# Patient Record
Sex: FEMALE | Race: WHITE | NOT HISPANIC OR LATINO | Employment: OTHER | URBAN - METROPOLITAN AREA
[De-identification: names, ages, dates, MRNs, and addresses within clinical notes are randomized per-mention and may not be internally consistent; named-entity substitution may affect disease eponyms.]

---

## 2023-01-01 ENCOUNTER — APPOINTMENT (OUTPATIENT)
Dept: CARDIOLOGY | Facility: HOSPITAL | Age: 88
End: 2023-01-01
Payer: MEDICARE

## 2023-01-01 ENCOUNTER — OFFICE VISIT (OUTPATIENT)
Dept: CARDIOLOGY | Facility: HOSPITAL | Age: 88
End: 2023-01-01
Payer: MEDICARE

## 2023-01-01 ENCOUNTER — HOSPITAL ENCOUNTER (EMERGENCY)
Facility: HOSPITAL | Age: 88
Discharge: HOME | End: 2023-10-09
Attending: EMERGENCY MEDICINE | Admitting: EMERGENCY MEDICINE
Payer: MEDICARE

## 2023-01-01 ENCOUNTER — INFUSION (OUTPATIENT)
Dept: INFUSION THERAPY | Facility: HOSPITAL | Age: 88
End: 2023-01-01
Payer: MEDICARE

## 2023-01-01 ENCOUNTER — TELEPHONE (OUTPATIENT)
Dept: INFUSION THERAPY | Facility: HOSPITAL | Age: 88
End: 2023-01-01
Payer: MEDICARE

## 2023-01-01 ENCOUNTER — NURSING HOME VISIT (OUTPATIENT)
Dept: POST ACUTE CARE | Facility: EXTERNAL LOCATION | Age: 88
End: 2023-01-01
Payer: MEDICARE

## 2023-01-01 ENCOUNTER — APPOINTMENT (OUTPATIENT)
Dept: RADIOLOGY | Facility: HOSPITAL | Age: 88
DRG: 871 | End: 2023-01-01
Payer: MEDICARE

## 2023-01-01 ENCOUNTER — APPOINTMENT (OUTPATIENT)
Dept: CARDIOLOGY | Facility: HOSPITAL | Age: 88
DRG: 871 | End: 2023-01-01
Payer: MEDICARE

## 2023-01-01 ENCOUNTER — APPOINTMENT (OUTPATIENT)
Dept: RADIOLOGY | Facility: HOSPITAL | Age: 88
End: 2023-01-01
Payer: MEDICARE

## 2023-01-01 ENCOUNTER — APPOINTMENT (OUTPATIENT)
Dept: CARDIOLOGY | Facility: CLINIC | Age: 88
End: 2023-01-01
Payer: MEDICARE

## 2023-01-01 ENCOUNTER — HOSPITAL ENCOUNTER (INPATIENT)
Facility: HOSPITAL | Age: 88
LOS: 4 days | DRG: 871 | End: 2023-11-30
Attending: STUDENT IN AN ORGANIZED HEALTH CARE EDUCATION/TRAINING PROGRAM | Admitting: INTERNAL MEDICINE
Payer: MEDICARE

## 2023-01-01 ENCOUNTER — APPOINTMENT (OUTPATIENT)
Dept: VASCULAR MEDICINE | Facility: HOSPITAL | Age: 88
DRG: 871 | End: 2023-01-01
Payer: MEDICARE

## 2023-01-01 ENCOUNTER — LAB (OUTPATIENT)
Dept: LAB | Facility: LAB | Age: 88
End: 2023-01-01
Payer: MEDICARE

## 2023-01-01 ENCOUNTER — NURSING HOME VISIT (OUTPATIENT)
Dept: POST ACUTE CARE | Facility: EXTERNAL LOCATION | Age: 88
End: 2023-01-01

## 2023-01-01 ENCOUNTER — OFFICE VISIT (OUTPATIENT)
Dept: PRIMARY CARE | Facility: CLINIC | Age: 88
End: 2023-01-01
Payer: MEDICARE

## 2023-01-01 VITALS
RESPIRATION RATE: 18 BRPM | DIASTOLIC BLOOD PRESSURE: 72 MMHG | OXYGEN SATURATION: 95 % | SYSTOLIC BLOOD PRESSURE: 103 MMHG | HEART RATE: 100 BPM

## 2023-01-01 VITALS
RESPIRATION RATE: 31 BRPM | TEMPERATURE: 96.4 F | BODY MASS INDEX: 23.67 KG/M2 | HEIGHT: 64 IN | HEART RATE: 73 BPM | DIASTOLIC BLOOD PRESSURE: 28 MMHG | OXYGEN SATURATION: 99 % | SYSTOLIC BLOOD PRESSURE: 81 MMHG | WEIGHT: 138.67 LBS

## 2023-01-01 VITALS
SYSTOLIC BLOOD PRESSURE: 112 MMHG | DIASTOLIC BLOOD PRESSURE: 73 MMHG | RESPIRATION RATE: 18 BRPM | WEIGHT: 127.3 LBS | HEART RATE: 60 BPM | OXYGEN SATURATION: 95 % | BODY MASS INDEX: 21.85 KG/M2

## 2023-01-01 VITALS
HEART RATE: 73 BPM | RESPIRATION RATE: 18 BRPM | HEIGHT: 64 IN | OXYGEN SATURATION: 94 % | SYSTOLIC BLOOD PRESSURE: 150 MMHG | DIASTOLIC BLOOD PRESSURE: 78 MMHG | WEIGHT: 115 LBS | BODY MASS INDEX: 19.63 KG/M2 | TEMPERATURE: 98.3 F

## 2023-01-01 VITALS
BODY MASS INDEX: 21.63 KG/M2 | DIASTOLIC BLOOD PRESSURE: 68 MMHG | RESPIRATION RATE: 20 BRPM | HEART RATE: 58 BPM | OXYGEN SATURATION: 92 % | SYSTOLIC BLOOD PRESSURE: 100 MMHG | WEIGHT: 126 LBS

## 2023-01-01 VITALS
OXYGEN SATURATION: 92 % | RESPIRATION RATE: 18 BRPM | WEIGHT: 130.5 LBS | BODY MASS INDEX: 22.4 KG/M2 | SYSTOLIC BLOOD PRESSURE: 100 MMHG | DIASTOLIC BLOOD PRESSURE: 59 MMHG | HEART RATE: 92 BPM

## 2023-01-01 VITALS
WEIGHT: 121.6 LBS | TEMPERATURE: 96.9 F | BODY MASS INDEX: 20.87 KG/M2 | DIASTOLIC BLOOD PRESSURE: 60 MMHG | SYSTOLIC BLOOD PRESSURE: 100 MMHG | HEART RATE: 108 BPM

## 2023-01-01 VITALS — SYSTOLIC BLOOD PRESSURE: 112 MMHG | DIASTOLIC BLOOD PRESSURE: 68 MMHG | RESPIRATION RATE: 18 BRPM | HEART RATE: 98 BPM

## 2023-01-01 DIAGNOSIS — L03.116 CELLULITIS OF LEFT LOWER EXTREMITY: Primary | ICD-10-CM

## 2023-01-01 DIAGNOSIS — I48.0 PAROXYSMAL ATRIAL FIBRILLATION (MULTI): ICD-10-CM

## 2023-01-01 DIAGNOSIS — I50.42 CHRONIC COMBINED SYSTOLIC AND DIASTOLIC HEART FAILURE (MULTI): ICD-10-CM

## 2023-01-01 DIAGNOSIS — S82.91XD: ICD-10-CM

## 2023-01-01 DIAGNOSIS — A41.9 SEPTIC SHOCK (MULTI): ICD-10-CM

## 2023-01-01 DIAGNOSIS — I50.42 CHRONIC COMBINED SYSTOLIC AND DIASTOLIC HEART FAILURE (MULTI): Primary | ICD-10-CM

## 2023-01-01 DIAGNOSIS — I10 HYPERTENSION, ESSENTIAL: ICD-10-CM

## 2023-01-01 DIAGNOSIS — L03.116 CELLULITIS OF LEFT LOWER EXTREMITY: ICD-10-CM

## 2023-01-01 DIAGNOSIS — R53.1 WEAKNESS: Primary | ICD-10-CM

## 2023-01-01 DIAGNOSIS — R53.1 WEAKNESS: ICD-10-CM

## 2023-01-01 DIAGNOSIS — E11.9 TYPE 2 DIABETES MELLITUS WITHOUT COMPLICATION, WITHOUT LONG-TERM CURRENT USE OF INSULIN (MULTI): ICD-10-CM

## 2023-01-01 DIAGNOSIS — E46 PROTEIN-CALORIE MALNUTRITION, UNSPECIFIED SEVERITY (MULTI): Primary | ICD-10-CM

## 2023-01-01 DIAGNOSIS — S82.91XD: Primary | ICD-10-CM

## 2023-01-01 DIAGNOSIS — I42.0 CARDIOMYOPATHY, DILATED, NONISCHEMIC (MULTI): ICD-10-CM

## 2023-01-01 DIAGNOSIS — R13.12 OROPHARYNGEAL DYSPHAGIA: ICD-10-CM

## 2023-01-01 DIAGNOSIS — R09.89 OTHER SPECIFIED SYMPTOMS AND SIGNS INVOLVING THE CIRCULATORY AND RESPIRATORY SYSTEMS: ICD-10-CM

## 2023-01-01 DIAGNOSIS — E83.42 HYPOMAGNESEMIA: Primary | ICD-10-CM

## 2023-01-01 DIAGNOSIS — E87.6 HYPOKALEMIA: ICD-10-CM

## 2023-01-01 DIAGNOSIS — R65.21 SEPTIC SHOCK (MULTI): ICD-10-CM

## 2023-01-01 DIAGNOSIS — I50.22 CHRONIC SYSTOLIC HEART FAILURE (MULTI): ICD-10-CM

## 2023-01-01 DIAGNOSIS — L03.818 CELLULITIS OF OTHER SPECIFIED SITE: ICD-10-CM

## 2023-01-01 DIAGNOSIS — L03.90 CELLULITIS, UNSPECIFIED CELLULITIS SITE: Primary | ICD-10-CM

## 2023-01-01 DIAGNOSIS — I73.9 PERIPHERAL VASCULAR DISEASE, UNSPECIFIED (CMS-HCC): ICD-10-CM

## 2023-01-01 DIAGNOSIS — I50.41 ACUTE COMBINED SYSTOLIC (CONGESTIVE) AND DIASTOLIC (CONGESTIVE) HEART FAILURE (MULTI): ICD-10-CM

## 2023-01-01 DIAGNOSIS — R19.7 DIARRHEA, UNSPECIFIED TYPE: ICD-10-CM

## 2023-01-01 LAB
ALBUMIN SERPL BCP-MCNC: 3.1 G/DL (ref 3.4–5)
ALBUMIN SERPL BCP-MCNC: 3.4 G/DL (ref 3.4–5)
ALP SERPL-CCNC: 59 U/L (ref 33–136)
ALP SERPL-CCNC: 92 U/L (ref 33–136)
ALT SERPL W P-5'-P-CCNC: 12 U/L (ref 7–45)
ALT SERPL W P-5'-P-CCNC: 15 U/L (ref 7–45)
ANION GAP BLDV CALCULATED.4IONS-SCNC: 14 MMOL/L (ref 10–25)
ANION GAP BLDV CALCULATED.4IONS-SCNC: 14 MMOL/L (ref 10–25)
ANION GAP BLDV CALCULATED.4IONS-SCNC: 7 MMOL/L (ref 10–25)
ANION GAP IN SER/PLAS: 12 MMOL/L (ref 10–20)
ANION GAP SERPL CALC-SCNC: 13 MMOL/L
ANION GAP SERPL CALC-SCNC: 13 MMOL/L (ref 10–20)
ANION GAP SERPL CALC-SCNC: 13 MMOL/L (ref 10–20)
ANION GAP SERPL CALC-SCNC: 14 MMOL/L (ref 10–20)
ANION GAP SERPL CALC-SCNC: 16 MMOL/L (ref 10–20)
ANION GAP SERPL CALC-SCNC: 16 MMOL/L (ref 10–20)
ANION GAP SERPL CALC-SCNC: 17 MMOL/L (ref 10–20)
ANION GAP SERPL CALC-SCNC: 21 MMOL/L (ref 10–20)
AORTIC VALVE PEAK VELOCITY: 0.85
AST SERPL W P-5'-P-CCNC: 18 U/L (ref 9–39)
AST SERPL W P-5'-P-CCNC: 34 U/L (ref 9–39)
AV PEAK GRADIENT: 2.9
AVA (PEAK VEL): 2.12
BASE EXCESS BLDV CALC-SCNC: -0.7 MMOL/L (ref -2–3)
BASE EXCESS BLDV CALC-SCNC: -3 MMOL/L (ref -2–3)
BASE EXCESS BLDV CALC-SCNC: -3.3 MMOL/L (ref -2–3)
BASE EXCESS BLDV CALC-SCNC: 4.1 MMOL/L (ref -2–3)
BASOPHILS # BLD AUTO: 0.03 X10*3/UL (ref 0–0.1)
BASOPHILS NFR BLD AUTO: 0.3 %
BASOPHILS NFR BLD AUTO: 0.4 %
BASOPHILS NFR BLD AUTO: 0.5 %
BILIRUB SERPL-MCNC: 1.4 MG/DL (ref 0–1.2)
BILIRUB SERPL-MCNC: 1.5 MG/DL (ref 0–1.2)
BNP SERPL-MCNC: 2886 PG/ML (ref 0–99)
BNP SERPL-MCNC: >4700 PG/ML (ref 0–99)
BODY TEMPERATURE: 37 DEGREES CELSIUS
BUN SERPL-MCNC: 26 MG/DL (ref 6–23)
BUN SERPL-MCNC: 26 MG/DL (ref 6–23)
BUN SERPL-MCNC: 27 MG/DL (ref 6–23)
BUN SERPL-MCNC: 28 MG/DL (ref 6–23)
BUN SERPL-MCNC: 29 MG/DL (ref 6–23)
BUN SERPL-MCNC: 8 MG/DL (ref 6–23)
BURR CELLS BLD QL SMEAR: NORMAL
CA-I BLDV-SCNC: 1.1 MMOL/L (ref 1.1–1.33)
CA-I BLDV-SCNC: 1.11 MMOL/L (ref 1.1–1.33)
CA-I BLDV-SCNC: 1.12 MMOL/L (ref 1.1–1.33)
CALCIUM (MG/DL) IN SER/PLAS: 9.6 MG/DL (ref 8.6–10.3)
CALCIUM SERPL-MCNC: 8.1 MG/DL (ref 8.6–10.3)
CALCIUM SERPL-MCNC: 8.1 MG/DL (ref 8.6–10.3)
CALCIUM SERPL-MCNC: 8.4 MG/DL (ref 8.6–10.3)
CALCIUM SERPL-MCNC: 8.5 MG/DL (ref 8.6–10.3)
CALCIUM SERPL-MCNC: 8.8 MG/DL (ref 8.6–10.3)
CALCIUM SERPL-MCNC: 8.9 MG/DL (ref 8.6–10.3)
CALCIUM SERPL-MCNC: 8.9 MG/DL (ref 8.6–10.3)
CALCIUM SERPL-MCNC: 9 MG/DL (ref 8.6–10.3)
CARBON DIOXIDE, TOTAL (MMOL/L) IN SER/PLAS: 31 MMOL/L (ref 21–32)
CHLORIDE (MMOL/L) IN SER/PLAS: 104 MMOL/L (ref 98–107)
CHLORIDE BLDV-SCNC: 97 MMOL/L (ref 98–107)
CHLORIDE BLDV-SCNC: 98 MMOL/L (ref 98–107)
CHLORIDE BLDV-SCNC: 98 MMOL/L (ref 98–107)
CHLORIDE SERPL-SCNC: 100 MMOL/L (ref 98–107)
CHLORIDE SERPL-SCNC: 101 MMOL/L (ref 98–107)
CHLORIDE SERPL-SCNC: 101 MMOL/L (ref 98–107)
CHLORIDE SERPL-SCNC: 102 MMOL/L (ref 98–107)
CHLORIDE SERPL-SCNC: 103 MMOL/L (ref 98–107)
CHLORIDE SERPL-SCNC: 96 MMOL/L (ref 98–107)
CHLORIDE SERPL-SCNC: 96 MMOL/L (ref 98–107)
CHLORIDE SERPL-SCNC: 97 MMOL/L (ref 98–107)
CO2 SERPL-SCNC: 16 MMOL/L (ref 21–32)
CO2 SERPL-SCNC: 23 MMOL/L (ref 21–32)
CO2 SERPL-SCNC: 24 MMOL/L (ref 21–32)
CO2 SERPL-SCNC: 25 MMOL/L (ref 21–32)
CO2 SERPL-SCNC: 26 MMOL/L (ref 21–32)
CO2 SERPL-SCNC: 27 MMOL/L (ref 21–32)
CO2 SERPL-SCNC: 27 MMOL/L (ref 21–32)
CO2 SERPL-SCNC: 28 MMOL/L (ref 21–32)
CREAT SERPL-MCNC: 0.85 MG/DL (ref 0.5–1.05)
CREAT SERPL-MCNC: 1.09 MG/DL (ref 0.5–1.05)
CREAT SERPL-MCNC: 1.12 MG/DL (ref 0.5–1.05)
CREAT SERPL-MCNC: 1.13 MG/DL (ref 0.5–1.05)
CREAT SERPL-MCNC: 1.21 MG/DL (ref 0.5–1.05)
CREAT SERPL-MCNC: 1.23 MG/DL (ref 0.5–1.05)
CREAT SERPL-MCNC: 1.23 MG/DL (ref 0.5–1.05)
CREAT SERPL-MCNC: 1.42 MG/DL (ref 0.5–1.05)
CREATININE (MG/DL) IN SER/PLAS: 1.54 MG/DL (ref 0.5–1.05)
CRP SERPL-MCNC: 12.47 MG/DL
EJECTION FRACTION APICAL 4 CHAMBER: 17.1
EJECTION FRACTION: 16
EOSINOPHIL # BLD AUTO: 0.01 X10*3/UL (ref 0–0.4)
EOSINOPHIL # BLD AUTO: 0.02 X10*3/UL (ref 0–0.4)
EOSINOPHIL # BLD AUTO: 0.03 X10*3/UL (ref 0–0.4)
EOSINOPHIL NFR BLD AUTO: 0.1 %
EOSINOPHIL NFR BLD AUTO: 0.3 %
EOSINOPHIL NFR BLD AUTO: 0.4 %
ERYTHROCYTE [DISTWIDTH] IN BLOOD BY AUTOMATED COUNT: 16 % (ref 11.5–14.5)
ERYTHROCYTE [DISTWIDTH] IN BLOOD BY AUTOMATED COUNT: 17 % (ref 11.5–14.5)
ERYTHROCYTE [DISTWIDTH] IN BLOOD BY AUTOMATED COUNT: 21.2 % (ref 11.5–14.5)
ERYTHROCYTE [DISTWIDTH] IN BLOOD BY AUTOMATED COUNT: 21.2 % (ref 11.5–14.5)
ERYTHROCYTE [DISTWIDTH] IN BLOOD BY AUTOMATED COUNT: 21.7 % (ref 11.5–14.5)
ERYTHROCYTE [DISTWIDTH] IN BLOOD BY AUTOMATED COUNT: 21.7 % (ref 11.5–14.5)
ERYTHROCYTE [DISTWIDTH] IN BLOOD BY AUTOMATED COUNT: 21.9 % (ref 11.5–14.5)
ERYTHROCYTE [DISTWIDTH] IN BLOOD BY AUTOMATED COUNT: NORMAL %
ERYTHROCYTE [SEDIMENTATION RATE] IN BLOOD BY WESTERGREN METHOD: 5 MM/H (ref 0–30)
GFR FEMALE: 33 ML/MIN/1.73M2
GFR SERPL CREATININE-BSD FRML MDRD: 36 ML/MIN/1.73M*2
GFR SERPL CREATININE-BSD FRML MDRD: 43 ML/MIN/1.73M*2
GFR SERPL CREATININE-BSD FRML MDRD: 43 ML/MIN/1.73M*2
GFR SERPL CREATININE-BSD FRML MDRD: 44 ML/MIN/1.73M*2
GFR SERPL CREATININE-BSD FRML MDRD: 47 ML/MIN/1.73M*2
GFR SERPL CREATININE-BSD FRML MDRD: 48 ML/MIN/1.73M*2
GFR SERPL CREATININE-BSD FRML MDRD: 50 ML/MIN/1.73M*2
GFR SERPL CREATININE-BSD FRML MDRD: 67 ML/MIN/1.73M*2
GLUCOSE (MG/DL) IN SER/PLAS: 104 MG/DL (ref 74–99)
GLUCOSE BLD MANUAL STRIP-MCNC: 69 MG/DL (ref 74–99)
GLUCOSE BLD MANUAL STRIP-MCNC: 73 MG/DL (ref 74–99)
GLUCOSE BLDV-MCNC: 137 MG/DL (ref 74–99)
GLUCOSE BLDV-MCNC: 59 MG/DL (ref 74–99)
GLUCOSE BLDV-MCNC: 83 MG/DL (ref 74–99)
GLUCOSE SERPL-MCNC: 118 MG/DL (ref 74–99)
GLUCOSE SERPL-MCNC: 125 MG/DL (ref 74–99)
GLUCOSE SERPL-MCNC: 135 MG/DL (ref 74–99)
GLUCOSE SERPL-MCNC: 62 MG/DL (ref 74–99)
GLUCOSE SERPL-MCNC: 71 MG/DL (ref 74–99)
GLUCOSE SERPL-MCNC: 76 MG/DL (ref 74–99)
GLUCOSE SERPL-MCNC: 77 MG/DL (ref 74–99)
GLUCOSE SERPL-MCNC: 92 MG/DL (ref 74–99)
HCO3 BLDV-SCNC: 22.7 MMOL/L (ref 22–26)
HCO3 BLDV-SCNC: 23.2 MMOL/L (ref 22–26)
HCO3 BLDV-SCNC: 23.4 MMOL/L (ref 22–26)
HCO3 BLDV-SCNC: 28.5 MMOL/L (ref 22–26)
HCT VFR BLD AUTO: 31.2 % (ref 36–46)
HCT VFR BLD AUTO: 36.2 % (ref 36–46)
HCT VFR BLD AUTO: 38.4 % (ref 36–46)
HCT VFR BLD AUTO: 38.9 % (ref 36–46)
HCT VFR BLD AUTO: 41.1 % (ref 36–46)
HCT VFR BLD AUTO: 42.4 % (ref 36–46)
HCT VFR BLD AUTO: 45.2 % (ref 36–46)
HCT VFR BLD AUTO: NORMAL %
HCT VFR BLD EST: 32 % (ref 36–46)
HCT VFR BLD EST: 34 % (ref 36–46)
HCT VFR BLD EST: 39 % (ref 36–46)
HGB BLD-MCNC: 11.3 G/DL (ref 12–16)
HGB BLD-MCNC: 12 G/DL (ref 12–16)
HGB BLD-MCNC: 12.2 G/DL (ref 12–16)
HGB BLD-MCNC: 12.9 G/DL (ref 12–16)
HGB BLD-MCNC: 13 G/DL (ref 12–16)
HGB BLD-MCNC: 13.7 G/DL (ref 12–16)
HGB BLD-MCNC: 9.7 G/DL (ref 12–16)
HGB BLD-MCNC: NORMAL G/DL
HGB BLDV-MCNC: 10.5 G/DL (ref 12–16)
HGB BLDV-MCNC: 11.4 G/DL (ref 12–16)
HGB BLDV-MCNC: 13.1 G/DL (ref 12–16)
HYPOCHROMIA BLD QL SMEAR: NORMAL
IMM GRANULOCYTES # BLD AUTO: 0.01 X10*3/UL (ref 0–0.5)
IMM GRANULOCYTES # BLD AUTO: 0.04 X10*3/UL (ref 0–0.5)
IMM GRANULOCYTES # BLD AUTO: 0.05 X10*3/UL (ref 0–0.5)
IMM GRANULOCYTES NFR BLD AUTO: 0.2 % (ref 0–0.9)
IMM GRANULOCYTES NFR BLD AUTO: 0.5 % (ref 0–0.9)
IMM GRANULOCYTES NFR BLD AUTO: 0.5 % (ref 0–0.9)
INHALED O2 CONCENTRATION: 21 %
LACTATE BLDV-SCNC: 1.7 MMOL/L (ref 0.4–2)
LACTATE BLDV-SCNC: 1.8 MMOL/L (ref 0.4–2)
LACTATE BLDV-SCNC: 3.3 MMOL/L (ref 0.4–2)
LACTATE BLDV-SCNC: 3.8 MMOL/L (ref 0.4–2)
LACTATE SERPL-SCNC: 3.1 MMOL/L (ref 0.4–2)
LACTATE SERPL-SCNC: 3.3 MMOL/L (ref 0.4–2)
LACTATE SERPL-SCNC: 4.7 MMOL/L (ref 0.4–2)
LEFT ATRIUM VOLUME AREA LENGTH INDEX BSA: 44.3
LEFT VENTRICLE INTERNAL DIMENSION DIASTOLE: 4.53 (ref 3.5–6)
LEFT VENTRICULAR OUTFLOW TRACT DIAMETER: 1.96
LYMPHOCYTES # BLD AUTO: 0.57 X10*3/UL (ref 0.8–3)
LYMPHOCYTES # BLD AUTO: 0.91 X10*3/UL (ref 0.8–3)
LYMPHOCYTES # BLD AUTO: 1.3 X10*3/UL (ref 0.8–3)
LYMPHOCYTES NFR BLD AUTO: 12.5 %
LYMPHOCYTES NFR BLD AUTO: 20.4 %
LYMPHOCYTES NFR BLD AUTO: 5.9 %
MAGNESIUM (MG/DL) IN SER/PLAS: 1.93 MG/DL (ref 1.6–2.4)
MAGNESIUM SERPL-MCNC: 1.46 MG/DL (ref 1.6–2.4)
MAGNESIUM SERPL-MCNC: 1.52 MG/DL (ref 1.6–2.4)
MAGNESIUM SERPL-MCNC: 1.56 MG/DL (ref 1.6–2.4)
MAGNESIUM SERPL-MCNC: 1.92 MG/DL (ref 1.6–2.4)
MCH RBC QN AUTO: 25 PG (ref 26–34)
MCH RBC QN AUTO: 25.5 PG (ref 26–34)
MCH RBC QN AUTO: 25.6 PG (ref 26–34)
MCH RBC QN AUTO: 25.6 PG (ref 26–34)
MCH RBC QN AUTO: 25.8 PG (ref 26–34)
MCH RBC QN AUTO: NORMAL PG
MCHC RBC AUTO-ENTMCNC: 30.3 G/DL (ref 32–36)
MCHC RBC AUTO-ENTMCNC: 30.7 G/DL (ref 32–36)
MCHC RBC AUTO-ENTMCNC: 31.1 G/DL (ref 32–36)
MCHC RBC AUTO-ENTMCNC: 31.2 G/DL (ref 32–36)
MCHC RBC AUTO-ENTMCNC: 31.3 G/DL (ref 32–36)
MCHC RBC AUTO-ENTMCNC: 31.4 G/DL (ref 32–36)
MCHC RBC AUTO-ENTMCNC: 31.4 G/DL (ref 32–36)
MCHC RBC AUTO-ENTMCNC: NORMAL G/DL
MCV RBC AUTO: 81 FL (ref 80–100)
MCV RBC AUTO: 82 FL (ref 80–100)
MCV RBC AUTO: 85 FL (ref 80–100)
MCV RBC AUTO: NORMAL FL
MITRAL VALVE E/A RATIO: 0.67
MITRAL VALVE E/E' RATIO: 31.13
MONOCYTES # BLD AUTO: 0.41 X10*3/UL (ref 0.05–0.8)
MONOCYTES # BLD AUTO: 0.45 X10*3/UL (ref 0.05–0.8)
MONOCYTES # BLD AUTO: 0.49 X10*3/UL (ref 0.05–0.8)
MONOCYTES NFR BLD AUTO: 4.2 %
MONOCYTES NFR BLD AUTO: 6.7 %
MONOCYTES NFR BLD AUTO: 7.1 %
MRSA DNA SPEC QL NAA+PROBE: NOT DETECTED
NATRIURETIC PEPTIDE B (PG/ML) IN SER/PLAS: 952 PG/ML (ref 0–99)
NEUTROPHILS # BLD AUTO: 4.56 X10*3/UL (ref 1.6–5.5)
NEUTROPHILS # BLD AUTO: 5.78 X10*3/UL (ref 1.6–5.5)
NEUTROPHILS # BLD AUTO: 8.65 X10*3/UL (ref 1.6–5.5)
NEUTROPHILS NFR BLD AUTO: 71.5 %
NEUTROPHILS NFR BLD AUTO: 79.5 %
NEUTROPHILS NFR BLD AUTO: 89 %
NEUTS VAC BLD QL SMEAR: PRESENT
NRBC BLD-RTO: 0 /100 WBCS (ref 0–0)
NRBC BLD-RTO: NORMAL /100{WBCS}
OVALOCYTES BLD QL SMEAR: NORMAL
OVALOCYTES BLD QL SMEAR: NORMAL
OXYHGB MFR BLDV: 12.9 % (ref 45–75)
OXYHGB MFR BLDV: 36.3 % (ref 45–75)
OXYHGB MFR BLDV: 43 % (ref 45–75)
OXYHGB MFR BLDV: 57.6 % (ref 45–75)
PCO2 BLDV: 36 MM HG (ref 41–51)
PCO2 BLDV: 41 MM HG (ref 41–51)
PCO2 BLDV: 44 MM HG (ref 41–51)
PCO2 BLDV: 45 MM HG (ref 41–51)
PH BLDV: 7.32 PH (ref 7.33–7.43)
PH BLDV: 7.32 PH (ref 7.33–7.43)
PH BLDV: 7.42 PH (ref 7.33–7.43)
PH BLDV: 7.45 PH (ref 7.33–7.43)
PHOSPHATE SERPL-MCNC: 4.1 MG/DL (ref 2.5–4.9)
PLATELET # BLD AUTO: 151 X10*3/UL (ref 150–450)
PLATELET # BLD AUTO: 201 X10*3/UL (ref 150–450)
PLATELET # BLD AUTO: 202 X10*3/UL (ref 150–450)
PLATELET # BLD AUTO: 214 X10*3/UL (ref 150–450)
PLATELET # BLD AUTO: 219 X10*3/UL (ref 150–450)
PLATELET # BLD AUTO: 241 X10*3/UL (ref 150–450)
PLATELET # BLD AUTO: 246 X10*3/UL (ref 150–450)
PLATELET # BLD AUTO: NORMAL 10*3/UL
PMV BLD AUTO: 9.5 FL (ref 7.5–11.5)
PMV BLD AUTO: 9.8 FL (ref 7.5–11.5)
PO2 BLDV: 23 MM HG (ref 35–45)
PO2 BLDV: 33 MM HG (ref 35–45)
PO2 BLDV: 35 MM HG (ref 35–45)
PO2 BLDV: 45 MM HG (ref 35–45)
POTASSIUM (MMOL/L) IN SER/PLAS: 3.9 MMOL/L (ref 3.5–5.3)
POTASSIUM BLDV-SCNC: 3.3 MMOL/L (ref 3.5–5.3)
POTASSIUM BLDV-SCNC: 3.4 MMOL/L (ref 3.5–5.3)
POTASSIUM BLDV-SCNC: 4.6 MMOL/L (ref 3.5–5.3)
POTASSIUM SERPL-SCNC: 2.8 MMOL/L (ref 3.5–5.3)
POTASSIUM SERPL-SCNC: 3.5 MMOL/L (ref 3.5–5.3)
POTASSIUM SERPL-SCNC: 3.5 MMOL/L (ref 3.5–5.3)
POTASSIUM SERPL-SCNC: 3.6 MMOL/L (ref 3.5–5.3)
POTASSIUM SERPL-SCNC: 4.1 MMOL/L (ref 3.5–5.3)
POTASSIUM SERPL-SCNC: 4.2 MMOL/L (ref 3.5–5.3)
POTASSIUM SERPL-SCNC: 4.2 MMOL/L (ref 3.5–5.3)
POTASSIUM SERPL-SCNC: 4.4 MMOL/L (ref 3.5–5.3)
PROT SERPL-MCNC: 4.8 G/DL (ref 6.4–8.2)
PROT SERPL-MCNC: 6.1 G/DL (ref 6.4–8.2)
RBC # BLD AUTO: 3.81 X10*6/UL (ref 4–5.2)
RBC # BLD AUTO: 4.44 X10*6/UL (ref 4–5.2)
RBC # BLD AUTO: 4.69 X10*6/UL (ref 4–5.2)
RBC # BLD AUTO: 4.78 X10*6/UL (ref 4–5.2)
RBC # BLD AUTO: 5.04 X10*6/UL (ref 4–5.2)
RBC # BLD AUTO: 5.19 X10*6/UL (ref 4–5.2)
RBC # BLD AUTO: 5.3 X10*6/UL (ref 4–5.2)
RBC # BLD AUTO: NORMAL 10*6/UL
RBC MORPH BLD: NORMAL
RBC MORPH BLD: NORMAL
RIGHT VENTRICLE FREE WALL PEAK S': 12.88
RIGHT VENTRICLE PEAK SYSTOLIC PRESSURE: 41.3
SAO2 % BLDV: 13 % (ref 45–75)
SAO2 % BLDV: 37 % (ref 45–75)
SAO2 % BLDV: 44 % (ref 45–75)
SAO2 % BLDV: 58 % (ref 45–75)
SODIUM (MMOL/L) IN SER/PLAS: 143 MMOL/L (ref 136–145)
SODIUM BLDV-SCNC: 128 MMOL/L (ref 136–145)
SODIUM BLDV-SCNC: 131 MMOL/L (ref 136–145)
SODIUM BLDV-SCNC: 132 MMOL/L (ref 136–145)
SODIUM SERPL-SCNC: 131 MMOL/L (ref 136–145)
SODIUM SERPL-SCNC: 134 MMOL/L (ref 136–145)
SODIUM SERPL-SCNC: 134 MMOL/L (ref 136–145)
SODIUM SERPL-SCNC: 135 MMOL/L (ref 136–145)
SODIUM SERPL-SCNC: 135 MMOL/L (ref 136–145)
SODIUM SERPL-SCNC: 137 MMOL/L (ref 136–145)
SODIUM SERPL-SCNC: 138 MMOL/L (ref 136–145)
SODIUM SERPL-SCNC: 140 MMOL/L (ref 136–145)
TARGETS BLD QL SMEAR: NORMAL
TEST COMMENT: ABNORMAL
TRICUSPID ANNULAR PLANE SYSTOLIC EXCURSION: 1.5
UREA NITROGEN (MG/DL) IN SER/PLAS: 23 MG/DL (ref 6–23)
VANCOMYCIN TROUGH SERPL-MCNC: 31.1 UG/ML (ref 5–20)
WBC # BLD AUTO: 6.4 X10*3/UL (ref 4.4–11.3)
WBC # BLD AUTO: 7.2 X10*3/UL (ref 4.4–11.3)
WBC # BLD AUTO: 7.3 X10*3/UL (ref 4.4–11.3)
WBC # BLD AUTO: 7.4 X10*3/UL (ref 4.4–11.3)
WBC # BLD AUTO: 8.5 X10*3/UL (ref 4.4–11.3)
WBC # BLD AUTO: 9.6 X10*3/UL (ref 4.4–11.3)
WBC # BLD AUTO: 9.7 X10*3/UL (ref 4.4–11.3)
WBC # BLD AUTO: NORMAL 10*3/UL

## 2023-01-01 PROCEDURE — 2500000001 HC RX 250 WO HCPCS SELF ADMINISTERED DRUGS (ALT 637 FOR MEDICARE OP): Performed by: NURSE PRACTITIONER

## 2023-01-01 PROCEDURE — 2500000004 HC RX 250 GENERAL PHARMACY W/ HCPCS (ALT 636 FOR OP/ED): Performed by: STUDENT IN AN ORGANIZED HEALTH CARE EDUCATION/TRAINING PROGRAM

## 2023-01-01 PROCEDURE — 73706 CT ANGIO LWR EXTR W/O&W/DYE: CPT | Mod: LT,FR

## 2023-01-01 PROCEDURE — 87040 BLOOD CULTURE FOR BACTERIA: CPT | Mod: PORLAB | Performed by: STUDENT IN AN ORGANIZED HEALTH CARE EDUCATION/TRAINING PROGRAM

## 2023-01-01 PROCEDURE — 96374 THER/PROPH/DIAG INJ IV PUSH: CPT | Mod: INF

## 2023-01-01 PROCEDURE — 02HV33Z INSERTION OF INFUSION DEVICE INTO SUPERIOR VENA CAVA, PERCUTANEOUS APPROACH: ICD-10-PCS | Performed by: PHYSICIAN ASSISTANT

## 2023-01-01 PROCEDURE — 86140 C-REACTIVE PROTEIN: CPT | Performed by: PHYSICIAN ASSISTANT

## 2023-01-01 PROCEDURE — 1036F TOBACCO NON-USER: CPT | Performed by: INTERNAL MEDICINE

## 2023-01-01 PROCEDURE — 73630 X-RAY EXAM OF FOOT: CPT | Mod: LT,FY

## 2023-01-01 PROCEDURE — 99291 CRITICAL CARE FIRST HOUR: CPT | Performed by: INTERNAL MEDICINE

## 2023-01-01 PROCEDURE — 1159F MED LIST DOCD IN RCRD: CPT | Performed by: NURSE PRACTITIONER

## 2023-01-01 PROCEDURE — 83735 ASSAY OF MAGNESIUM: CPT

## 2023-01-01 PROCEDURE — 71045 X-RAY EXAM CHEST 1 VIEW: CPT | Performed by: STUDENT IN AN ORGANIZED HEALTH CARE EDUCATION/TRAINING PROGRAM

## 2023-01-01 PROCEDURE — 84075 ASSAY ALKALINE PHOSPHATASE: CPT | Performed by: STUDENT IN AN ORGANIZED HEALTH CARE EDUCATION/TRAINING PROGRAM

## 2023-01-01 PROCEDURE — 3074F SYST BP LT 130 MM HG: CPT | Performed by: NURSE PRACTITIONER

## 2023-01-01 PROCEDURE — 87075 CULTR BACTERIA EXCEPT BLOOD: CPT | Mod: PORLAB | Performed by: PHYSICIAN ASSISTANT

## 2023-01-01 PROCEDURE — 36556 INSERT NON-TUNNEL CV CATH: CPT | Performed by: INTERNAL MEDICINE

## 2023-01-01 PROCEDURE — 87075 CULTR BACTERIA EXCEPT BLOOD: CPT | Mod: PORLAB | Performed by: STUDENT IN AN ORGANIZED HEALTH CARE EDUCATION/TRAINING PROGRAM

## 2023-01-01 PROCEDURE — 2500000004 HC RX 250 GENERAL PHARMACY W/ HCPCS (ALT 636 FOR OP/ED): Performed by: INTERNAL MEDICINE

## 2023-01-01 PROCEDURE — 80048 BASIC METABOLIC PNL TOTAL CA: CPT

## 2023-01-01 PROCEDURE — 93306 TTE W/DOPPLER COMPLETE: CPT | Performed by: INTERNAL MEDICINE

## 2023-01-01 PROCEDURE — 2500000001 HC RX 250 WO HCPCS SELF ADMINISTERED DRUGS (ALT 637 FOR MEDICARE OP): Performed by: INTERNAL MEDICINE

## 2023-01-01 PROCEDURE — 1036F TOBACCO NON-USER: CPT | Performed by: NURSE PRACTITIONER

## 2023-01-01 PROCEDURE — 2500000001 HC RX 250 WO HCPCS SELF ADMINISTERED DRUGS (ALT 637 FOR MEDICARE OP): Performed by: PHYSICIAN ASSISTANT

## 2023-01-01 PROCEDURE — 84295 ASSAY OF SERUM SODIUM: CPT | Performed by: STUDENT IN AN ORGANIZED HEALTH CARE EDUCATION/TRAINING PROGRAM

## 2023-01-01 PROCEDURE — 99214 OFFICE O/P EST MOD 30 MIN: CPT | Performed by: NURSE PRACTITIONER

## 2023-01-01 PROCEDURE — 85027 COMPLETE CBC AUTOMATED: CPT | Performed by: INTERNAL MEDICINE

## 2023-01-01 PROCEDURE — 93971 EXTREMITY STUDY: CPT | Performed by: RADIOLOGY

## 2023-01-01 PROCEDURE — 1160F RVW MEDS BY RX/DR IN RCRD: CPT | Performed by: INTERNAL MEDICINE

## 2023-01-01 PROCEDURE — 36415 COLL VENOUS BLD VENIPUNCTURE: CPT | Performed by: INTERNAL MEDICINE

## 2023-01-01 PROCEDURE — 80048 BASIC METABOLIC PNL TOTAL CA: CPT | Performed by: INTERNAL MEDICINE

## 2023-01-01 PROCEDURE — 99239 HOSP IP/OBS DSCHRG MGMT >30: CPT | Performed by: PHYSICIAN ASSISTANT

## 2023-01-01 PROCEDURE — 2500000004 HC RX 250 GENERAL PHARMACY W/ HCPCS (ALT 636 FOR OP/ED)

## 2023-01-01 PROCEDURE — 99308 SBSQ NF CARE LOW MDM 20: CPT | Performed by: INTERNAL MEDICINE

## 2023-01-01 PROCEDURE — 2500000002 HC RX 250 W HCPCS SELF ADMINISTERED DRUGS (ALT 637 FOR MEDICARE OP, ALT 636 FOR OP/ED): Performed by: INTERNAL MEDICINE

## 2023-01-01 PROCEDURE — 1126F AMNT PAIN NOTED NONE PRSNT: CPT | Performed by: NURSE PRACTITIONER

## 2023-01-01 PROCEDURE — 99309 SBSQ NF CARE MODERATE MDM 30: CPT | Performed by: NURSE PRACTITIONER

## 2023-01-01 PROCEDURE — 82947 ASSAY GLUCOSE BLOOD QUANT: CPT

## 2023-01-01 PROCEDURE — 1100000001 HC PRIVATE ROOM DAILY

## 2023-01-01 PROCEDURE — 84132 ASSAY OF SERUM POTASSIUM: CPT | Performed by: STUDENT IN AN ORGANIZED HEALTH CARE EDUCATION/TRAINING PROGRAM

## 2023-01-01 PROCEDURE — 83880 ASSAY OF NATRIURETIC PEPTIDE: CPT

## 2023-01-01 PROCEDURE — 71045 X-RAY EXAM CHEST 1 VIEW: CPT | Performed by: RADIOLOGY

## 2023-01-01 PROCEDURE — 83605 ASSAY OF LACTIC ACID: CPT | Performed by: INTERNAL MEDICINE

## 2023-01-01 PROCEDURE — 83605 ASSAY OF LACTIC ACID: CPT | Performed by: PHYSICIAN ASSISTANT

## 2023-01-01 PROCEDURE — 1160F RVW MEDS BY RX/DR IN RCRD: CPT | Performed by: NURSE PRACTITIONER

## 2023-01-01 PROCEDURE — 71045 X-RAY EXAM CHEST 1 VIEW: CPT | Mod: FY

## 2023-01-01 PROCEDURE — 2500000005 HC RX 250 GENERAL PHARMACY W/O HCPCS: Performed by: PHARMACIST

## 2023-01-01 PROCEDURE — 36415 COLL VENOUS BLD VENIPUNCTURE: CPT | Performed by: NURSE PRACTITIONER

## 2023-01-01 PROCEDURE — 99232 SBSQ HOSP IP/OBS MODERATE 35: CPT | Performed by: NURSE PRACTITIONER

## 2023-01-01 PROCEDURE — 83735 ASSAY OF MAGNESIUM: CPT | Performed by: INTERNAL MEDICINE

## 2023-01-01 PROCEDURE — 85025 COMPLETE CBC W/AUTO DIFF WBC: CPT | Performed by: STUDENT IN AN ORGANIZED HEALTH CARE EDUCATION/TRAINING PROGRAM

## 2023-01-01 PROCEDURE — 73706 CT ANGIO LWR EXTR W/O&W/DYE: CPT | Mod: LEFT SIDE | Performed by: RADIOLOGY

## 2023-01-01 PROCEDURE — 92610 EVALUATE SWALLOWING FUNCTION: CPT | Mod: GN | Performed by: SPEECH-LANGUAGE PATHOLOGIST

## 2023-01-01 PROCEDURE — 73718 MRI LOWER EXTREMITY W/O DYE: CPT | Mod: LEFT SIDE | Performed by: RADIOLOGY

## 2023-01-01 PROCEDURE — 99223 1ST HOSP IP/OBS HIGH 75: CPT | Performed by: PHYSICIAN ASSISTANT

## 2023-01-01 PROCEDURE — 36415 COLL VENOUS BLD VENIPUNCTURE: CPT | Performed by: STUDENT IN AN ORGANIZED HEALTH CARE EDUCATION/TRAINING PROGRAM

## 2023-01-01 PROCEDURE — 85652 RBC SED RATE AUTOMATED: CPT | Performed by: PHYSICIAN ASSISTANT

## 2023-01-01 PROCEDURE — 93922 UPR/L XTREMITY ART 2 LEVELS: CPT | Performed by: SURGERY

## 2023-01-01 PROCEDURE — 99214 OFFICE O/P EST MOD 30 MIN: CPT | Mod: 25 | Performed by: NURSE PRACTITIONER

## 2023-01-01 PROCEDURE — 84132 ASSAY OF SERUM POTASSIUM: CPT | Performed by: INTERNAL MEDICINE

## 2023-01-01 PROCEDURE — 1159F MED LIST DOCD IN RCRD: CPT | Performed by: INTERNAL MEDICINE

## 2023-01-01 PROCEDURE — 2500000004 HC RX 250 GENERAL PHARMACY W/ HCPCS (ALT 636 FOR OP/ED): Performed by: NURSE PRACTITIONER

## 2023-01-01 PROCEDURE — 99305 1ST NF CARE MODERATE MDM 35: CPT | Performed by: INTERNAL MEDICINE

## 2023-01-01 PROCEDURE — 3078F DIAST BP <80 MM HG: CPT | Performed by: NURSE PRACTITIONER

## 2023-01-01 PROCEDURE — 36415 COLL VENOUS BLD VENIPUNCTURE: CPT | Performed by: PHYSICIAN ASSISTANT

## 2023-01-01 PROCEDURE — 73718 MRI LOWER EXTREMITY W/O DYE: CPT | Mod: LT

## 2023-01-01 PROCEDURE — 99213 OFFICE O/P EST LOW 20 MIN: CPT | Performed by: NURSE PRACTITIONER

## 2023-01-01 PROCEDURE — 93922 UPR/L XTREMITY ART 2 LEVELS: CPT

## 2023-01-01 PROCEDURE — 93306 TTE W/DOPPLER COMPLETE: CPT

## 2023-01-01 PROCEDURE — 85025 COMPLETE CBC W/AUTO DIFF WBC: CPT | Performed by: NURSE PRACTITIONER

## 2023-01-01 PROCEDURE — 80053 COMPREHEN METABOLIC PANEL: CPT | Performed by: INTERNAL MEDICINE

## 2023-01-01 PROCEDURE — 82805 BLOOD GASES W/O2 SATURATION: CPT | Performed by: INTERNAL MEDICINE

## 2023-01-01 PROCEDURE — 73630 X-RAY EXAM OF FOOT: CPT | Mod: LEFT SIDE | Performed by: RADIOLOGY

## 2023-01-01 PROCEDURE — 2500000005 HC RX 250 GENERAL PHARMACY W/O HCPCS: Performed by: INTERNAL MEDICINE

## 2023-01-01 PROCEDURE — 96367 TX/PROPH/DG ADDL SEQ IV INF: CPT

## 2023-01-01 PROCEDURE — 36415 COLL VENOUS BLD VENIPUNCTURE: CPT

## 2023-01-01 PROCEDURE — 1126F AMNT PAIN NOTED NONE PRSNT: CPT | Performed by: INTERNAL MEDICINE

## 2023-01-01 PROCEDURE — 87641 MR-STAPH DNA AMP PROBE: CPT | Performed by: PHARMACIST

## 2023-01-01 PROCEDURE — 96365 THER/PROPH/DIAG IV INF INIT: CPT

## 2023-01-01 PROCEDURE — 85018 HEMOGLOBIN: CPT | Performed by: STUDENT IN AN ORGANIZED HEALTH CARE EDUCATION/TRAINING PROGRAM

## 2023-01-01 PROCEDURE — 80202 ASSAY OF VANCOMYCIN: CPT | Performed by: INTERNAL MEDICINE

## 2023-01-01 PROCEDURE — 85025 COMPLETE CBC W/AUTO DIFF WBC: CPT | Performed by: INTERNAL MEDICINE

## 2023-01-01 PROCEDURE — 2020000001 HC ICU ROOM DAILY

## 2023-01-01 PROCEDURE — 99233 SBSQ HOSP IP/OBS HIGH 50: CPT | Performed by: PHYSICIAN ASSISTANT

## 2023-01-01 PROCEDURE — 92526 ORAL FUNCTION THERAPY: CPT | Mod: GN | Performed by: SPEECH-LANGUAGE PATHOLOGIST

## 2023-01-01 PROCEDURE — 83735 ASSAY OF MAGNESIUM: CPT | Performed by: STUDENT IN AN ORGANIZED HEALTH CARE EDUCATION/TRAINING PROGRAM

## 2023-01-01 PROCEDURE — 3E043XZ INTRODUCTION OF VASOPRESSOR INTO CENTRAL VEIN, PERCUTANEOUS APPROACH: ICD-10-PCS | Performed by: PHYSICIAN ASSISTANT

## 2023-01-01 PROCEDURE — P9045 ALBUMIN (HUMAN), 5%, 250 ML: HCPCS | Mod: JZ | Performed by: PHYSICIAN ASSISTANT

## 2023-01-01 PROCEDURE — 85018 HEMOGLOBIN: CPT | Performed by: INTERNAL MEDICINE

## 2023-01-01 PROCEDURE — 99231 SBSQ HOSP IP/OBS SF/LOW 25: CPT

## 2023-01-01 PROCEDURE — 99285 EMERGENCY DEPT VISIT HI MDM: CPT | Mod: 25 | Performed by: STUDENT IN AN ORGANIZED HEALTH CARE EDUCATION/TRAINING PROGRAM

## 2023-01-01 PROCEDURE — 85027 COMPLETE CBC AUTOMATED: CPT

## 2023-01-01 PROCEDURE — 87185 SC STD ENZYME DETCJ PER NZM: CPT | Mod: PORLAB | Performed by: PHYSICIAN ASSISTANT

## 2023-01-01 PROCEDURE — 80048 BASIC METABOLIC PNL TOTAL CA: CPT | Performed by: NURSE PRACTITIONER

## 2023-01-01 PROCEDURE — 99418 PROLNG IP/OBS E/M EA 15 MIN: CPT

## 2023-01-01 PROCEDURE — 2550000001 HC RX 255 CONTRASTS: Performed by: STUDENT IN AN ORGANIZED HEALTH CARE EDUCATION/TRAINING PROGRAM

## 2023-01-01 PROCEDURE — 83605 ASSAY OF LACTIC ACID: CPT | Performed by: STUDENT IN AN ORGANIZED HEALTH CARE EDUCATION/TRAINING PROGRAM

## 2023-01-01 PROCEDURE — 2500000004 HC RX 250 GENERAL PHARMACY W/ HCPCS (ALT 636 FOR OP/ED): Performed by: PHYSICIAN ASSISTANT

## 2023-01-01 PROCEDURE — 37799 UNLISTED PX VASCULAR SURGERY: CPT | Performed by: INTERNAL MEDICINE

## 2023-01-01 PROCEDURE — 99233 SBSQ HOSP IP/OBS HIGH 50: CPT | Performed by: INTERNAL MEDICINE

## 2023-01-01 PROCEDURE — 84100 ASSAY OF PHOSPHORUS: CPT | Performed by: INTERNAL MEDICINE

## 2023-01-01 PROCEDURE — 97166 OT EVAL MOD COMPLEX 45 MIN: CPT | Mod: GO | Performed by: OCCUPATIONAL THERAPIST

## 2023-01-01 PROCEDURE — 83880 ASSAY OF NATRIURETIC PEPTIDE: CPT | Performed by: PHYSICIAN ASSISTANT

## 2023-01-01 PROCEDURE — 93971 EXTREMITY STUDY: CPT

## 2023-01-01 PROCEDURE — 99223 1ST HOSP IP/OBS HIGH 75: CPT

## 2023-01-01 PROCEDURE — 99223 1ST HOSP IP/OBS HIGH 75: CPT | Performed by: INTERNAL MEDICINE

## 2023-01-01 PROCEDURE — 99214 OFFICE O/P EST MOD 30 MIN: CPT | Performed by: INTERNAL MEDICINE

## 2023-01-01 PROCEDURE — 3078F DIAST BP <80 MM HG: CPT | Performed by: INTERNAL MEDICINE

## 2023-01-01 PROCEDURE — 2500000002 HC RX 250 W HCPCS SELF ADMINISTERED DRUGS (ALT 637 FOR MEDICARE OP, ALT 636 FOR OP/ED): Performed by: PHYSICIAN ASSISTANT

## 2023-01-01 PROCEDURE — 1200000002 HC GENERAL ROOM WITH TELEMETRY DAILY

## 2023-01-01 PROCEDURE — 3074F SYST BP LT 130 MM HG: CPT | Performed by: INTERNAL MEDICINE

## 2023-01-01 PROCEDURE — 99306 1ST NF CARE HIGH MDM 50: CPT | Performed by: INTERNAL MEDICINE

## 2023-01-01 PROCEDURE — 99285 EMERGENCY DEPT VISIT HI MDM: CPT | Mod: 25

## 2023-01-01 RX ORDER — APIXABAN 2.5 MG/1
2.5 TABLET, FILM COATED ORAL 2 TIMES DAILY
COMMUNITY
End: 2023-01-01 | Stop reason: SDUPTHER

## 2023-01-01 RX ORDER — SACUBITRIL AND VALSARTAN 49; 51 MG/1; MG/1
1 TABLET, FILM COATED ORAL 2 TIMES DAILY
COMMUNITY
End: 2023-01-01 | Stop reason: SDUPTHER

## 2023-01-01 RX ORDER — ZINC OXIDE 20 G/100G
1 OINTMENT TOPICAL 2 TIMES DAILY
Status: DISCONTINUED | OUTPATIENT
Start: 2023-01-01 | End: 2023-12-01 | Stop reason: HOSPADM

## 2023-01-01 RX ORDER — MIDAZOLAM HYDROCHLORIDE 1 MG/ML
2 INJECTION, SOLUTION INTRAMUSCULAR; INTRAVENOUS ONCE
Status: COMPLETED | OUTPATIENT
Start: 2023-01-01 | End: 2023-01-01

## 2023-01-01 RX ORDER — FUROSEMIDE 10 MG/ML
40 INJECTION INTRAMUSCULAR; INTRAVENOUS ONCE
Status: CANCELLED
Start: 2023-01-01

## 2023-01-01 RX ORDER — MEROPENEM 1 G/1
1000 INJECTION, POWDER, FOR SOLUTION INTRAVENOUS EVERY 12 HOURS
Status: DISCONTINUED | OUTPATIENT
Start: 2023-01-01 | End: 2023-12-01 | Stop reason: HOSPADM

## 2023-01-01 RX ORDER — FUROSEMIDE 40 MG/1
40 TABLET ORAL DAILY
Status: DISCONTINUED | OUTPATIENT
Start: 2023-01-01 | End: 2023-01-01

## 2023-01-01 RX ORDER — SODIUM CHLORIDE 9 MG/ML
100 INJECTION, SOLUTION INTRAVENOUS CONTINUOUS
Status: DISCONTINUED | OUTPATIENT
Start: 2023-01-01 | End: 2023-01-01

## 2023-01-01 RX ORDER — DAPAGLIFLOZIN 10 MG/1
10 TABLET, FILM COATED ORAL
COMMUNITY

## 2023-01-01 RX ORDER — FUROSEMIDE 10 MG/ML
40 INJECTION INTRAMUSCULAR; INTRAVENOUS ONCE
Status: COMPLETED | OUTPATIENT
Start: 2023-01-01 | End: 2023-01-01

## 2023-01-01 RX ORDER — ALBUMIN HUMAN 50 G/1000ML
25 SOLUTION INTRAVENOUS ONCE
Status: COMPLETED | OUTPATIENT
Start: 2023-01-01 | End: 2023-01-01

## 2023-01-01 RX ORDER — FUROSEMIDE 40 MG/1
40 TABLET ORAL DAILY
COMMUNITY

## 2023-01-01 RX ORDER — MAGNESIUM GLUCONATE 27 MG(500)
27 TABLET ORAL DAILY
Qty: 60 TABLET | Refills: 11 | Status: SHIPPED | OUTPATIENT
Start: 2023-01-01 | End: 2023-12-01

## 2023-01-01 RX ORDER — MIRTAZAPINE 7.5 MG/1
7.5 TABLET, FILM COATED ORAL NIGHTLY
Status: DISCONTINUED | OUTPATIENT
Start: 2023-01-01 | End: 2023-12-01 | Stop reason: HOSPADM

## 2023-01-01 RX ORDER — ALPRAZOLAM 0.25 MG/1
0.25 TABLET ORAL ONCE
Status: COMPLETED | OUTPATIENT
Start: 2023-01-01 | End: 2023-01-01

## 2023-01-01 RX ORDER — CARVEDILOL 6.25 MG/1
6.25 TABLET ORAL 2 TIMES DAILY
Status: DISCONTINUED | OUTPATIENT
Start: 2023-01-01 | End: 2023-12-01 | Stop reason: HOSPADM

## 2023-01-01 RX ORDER — ACETAMINOPHEN 650 MG/1
650 SUPPOSITORY RECTAL EVERY 4 HOURS PRN
Status: DISCONTINUED | OUTPATIENT
Start: 2023-01-01 | End: 2023-12-01 | Stop reason: HOSPADM

## 2023-01-01 RX ORDER — NOREPINEPHRINE BITARTRATE/D5W 8 MG/250ML
PLASTIC BAG, INJECTION (ML) INTRAVENOUS
Status: DISPENSED
Start: 2023-01-01 | End: 2023-01-01

## 2023-01-01 RX ORDER — POTASSIUM CHLORIDE 750 MG/1
40 TABLET, FILM COATED, EXTENDED RELEASE ORAL ONCE
Status: COMPLETED | OUTPATIENT
Start: 2023-01-01 | End: 2023-01-01

## 2023-01-01 RX ORDER — POTASSIUM CHLORIDE 20 MEQ/1
20 TABLET, EXTENDED RELEASE ORAL 2 TIMES DAILY
Qty: 14 TABLET | Refills: 0 | Status: SHIPPED | OUTPATIENT
Start: 2023-01-01 | End: 2023-01-01

## 2023-01-01 RX ORDER — ONDANSETRON 4 MG/1
4 TABLET, FILM COATED ORAL EVERY 8 HOURS PRN
Status: DISCONTINUED | OUTPATIENT
Start: 2023-01-01 | End: 2023-12-01 | Stop reason: HOSPADM

## 2023-01-01 RX ORDER — ACETAMINOPHEN 325 MG/1
650 TABLET ORAL EVERY 4 HOURS PRN
Status: DISCONTINUED | OUTPATIENT
Start: 2023-01-01 | End: 2023-12-01 | Stop reason: HOSPADM

## 2023-01-01 RX ORDER — ATORVASTATIN CALCIUM 40 MG/1
40 TABLET, FILM COATED ORAL DAILY
Status: ON HOLD | COMMUNITY
End: 2023-01-01 | Stop reason: ENTERED-IN-ERROR

## 2023-01-01 RX ORDER — CEFEPIME 1 G/50ML
2 INJECTION, SOLUTION INTRAVENOUS EVERY 12 HOURS
Status: DISCONTINUED | OUTPATIENT
Start: 2023-01-01 | End: 2023-01-01

## 2023-01-01 RX ORDER — MAGNESIUM SULFATE HEPTAHYDRATE 40 MG/ML
2 INJECTION, SOLUTION INTRAVENOUS ONCE
Status: COMPLETED | OUTPATIENT
Start: 2023-01-01 | End: 2023-01-01

## 2023-01-01 RX ORDER — SPIRONOLACTONE 25 MG/1
25 TABLET ORAL DAILY
Qty: 90 TABLET | Refills: 1 | Status: SHIPPED | OUTPATIENT
Start: 2023-01-01 | End: 2023-12-01

## 2023-01-01 RX ORDER — LORAZEPAM 2 MG/ML
0.5 INJECTION INTRAMUSCULAR EVERY 8 HOURS PRN
Status: DISCONTINUED | OUTPATIENT
Start: 2023-01-01 | End: 2023-12-01 | Stop reason: HOSPADM

## 2023-01-01 RX ORDER — ACETAMINOPHEN 160 MG/5ML
650 SOLUTION ORAL EVERY 4 HOURS PRN
Status: DISCONTINUED | OUTPATIENT
Start: 2023-01-01 | End: 2023-12-01 | Stop reason: HOSPADM

## 2023-01-01 RX ORDER — AMIODARONE HYDROCHLORIDE 200 MG/1
200 TABLET ORAL DAILY
COMMUNITY

## 2023-01-01 RX ORDER — CLINDAMYCIN PHOSPHATE 900 MG/50ML
900 INJECTION, SOLUTION INTRAVENOUS EVERY 8 HOURS
Status: DISCONTINUED | OUTPATIENT
Start: 2023-01-01 | End: 2023-01-01

## 2023-01-01 RX ORDER — DOPAMINE HYDROCHLORIDE 160 MG/100ML
1-10 INJECTION, SOLUTION INTRAVENOUS CONTINUOUS
Status: DISCONTINUED | OUTPATIENT
Start: 2023-01-01 | End: 2023-01-01

## 2023-01-01 RX ORDER — OMEPRAZOLE 20 MG/1
20 TABLET, DELAYED RELEASE ORAL
Status: ON HOLD | COMMUNITY
End: 2023-01-01 | Stop reason: WASHOUT

## 2023-01-01 RX ORDER — MIDAZOLAM HYDROCHLORIDE 1 MG/ML
INJECTION, SOLUTION INTRAMUSCULAR; INTRAVENOUS
Status: COMPLETED
Start: 2023-01-01 | End: 2023-01-01

## 2023-01-01 RX ORDER — AMIODARONE HYDROCHLORIDE 200 MG/1
200 TABLET ORAL DAILY
Status: DISCONTINUED | OUTPATIENT
Start: 2023-01-01 | End: 2023-12-01 | Stop reason: HOSPADM

## 2023-01-01 RX ORDER — CARVEDILOL 12.5 MG/1
1.5 TABLET ORAL 2 TIMES DAILY
COMMUNITY

## 2023-01-01 RX ORDER — ONDANSETRON HYDROCHLORIDE 2 MG/ML
4 INJECTION, SOLUTION INTRAVENOUS EVERY 8 HOURS PRN
Status: DISCONTINUED | OUTPATIENT
Start: 2023-01-01 | End: 2023-12-01 | Stop reason: HOSPADM

## 2023-01-01 RX ORDER — POTASSIUM CHLORIDE 20 MEQ/1
20 TABLET, EXTENDED RELEASE ORAL DAILY
COMMUNITY

## 2023-01-01 RX ORDER — SPIRONOLACTONE 25 MG/1
12.5 TABLET ORAL DAILY
COMMUNITY
Start: 2023-01-01 | End: 2023-01-01 | Stop reason: SDUPTHER

## 2023-01-01 RX ORDER — CEPHALEXIN 250 MG/1
500 CAPSULE ORAL ONCE
Status: COMPLETED | OUTPATIENT
Start: 2023-01-01 | End: 2023-01-01

## 2023-01-01 RX ORDER — NOREPINEPHRINE BITARTRATE 0.06 MG/ML
.05-1 INJECTION, SOLUTION INTRAVENOUS CONTINUOUS
Status: DISCONTINUED | OUTPATIENT
Start: 2023-01-01 | End: 2023-01-01

## 2023-01-01 RX ORDER — CEPHALEXIN 500 MG/1
500 CAPSULE ORAL 4 TIMES DAILY
Qty: 28 CAPSULE | Refills: 0 | Status: SHIPPED | OUTPATIENT
Start: 2023-01-01 | End: 2023-01-01 | Stop reason: ALTCHOICE

## 2023-01-01 RX ORDER — PHENYLEPHRINE 10 MG/250 ML(40 MCG/ML)IN 0.9 % SOD.CHLORIDE INTRAVENOUS
0-9 CONTINUOUS
Status: DISCONTINUED | OUTPATIENT
Start: 2023-01-01 | End: 2023-01-01

## 2023-01-01 RX ORDER — FUROSEMIDE 10 MG/ML
80 INJECTION INTRAMUSCULAR; INTRAVENOUS 2 TIMES DAILY
Status: DISCONTINUED | OUTPATIENT
Start: 2023-01-01 | End: 2023-12-01 | Stop reason: HOSPADM

## 2023-01-01 RX ORDER — FUROSEMIDE 10 MG/ML
40 INJECTION INTRAMUSCULAR; INTRAVENOUS ONCE
Status: CANCELLED
Start: 2023-01-01 | End: 2023-01-01

## 2023-01-01 RX ORDER — NOREPINEPHRINE BITARTRATE 0.06 MG/ML
.01-.5 INJECTION, SOLUTION INTRAVENOUS CONTINUOUS
Status: DISCONTINUED | OUTPATIENT
Start: 2023-01-01 | End: 2023-01-01

## 2023-01-01 RX ORDER — NOREPINEPHRINE BITARTRATE/D5W 8 MG/250ML
.01-1 PLASTIC BAG, INJECTION (ML) INTRAVENOUS CONTINUOUS
Status: DISCONTINUED | OUTPATIENT
Start: 2023-01-01 | End: 2023-01-01

## 2023-01-01 RX ORDER — ACETAMINOPHEN 325 MG/1
325 TABLET ORAL EVERY 6 HOURS PRN
COMMUNITY

## 2023-01-01 RX ORDER — VANCOMYCIN HYDROCHLORIDE 750 MG/150ML
750 INJECTION, SOLUTION INTRAVENOUS EVERY 24 HOURS
Status: DISCONTINUED | OUTPATIENT
Start: 2023-01-01 | End: 2023-12-01 | Stop reason: HOSPADM

## 2023-01-01 RX ORDER — SACUBITRIL AND VALSARTAN 49; 51 MG/1; MG/1
1 TABLET, FILM COATED ORAL 2 TIMES DAILY
Qty: 180 TABLET | Refills: 3 | Status: SHIPPED | OUTPATIENT
Start: 2023-01-01 | End: 2023-12-01

## 2023-01-01 RX ORDER — APIXABAN 2.5 MG/1
2.5 TABLET, FILM COATED ORAL 2 TIMES DAILY
Qty: 180 TABLET | Refills: 1 | Status: SHIPPED | OUTPATIENT
Start: 2023-01-01 | End: 2023-12-01

## 2023-01-01 RX ORDER — LANOLIN ALCOHOL/MO/W.PET/CERES
400 CREAM (GRAM) TOPICAL DAILY
Status: DISCONTINUED | OUTPATIENT
Start: 2023-01-01 | End: 2023-12-01 | Stop reason: HOSPADM

## 2023-01-01 RX ORDER — DEXTROSE MONOHYDRATE AND SODIUM CHLORIDE 5; .9 G/100ML; G/100ML
30 INJECTION, SOLUTION INTRAVENOUS CONTINUOUS
Status: DISCONTINUED | OUTPATIENT
Start: 2023-01-01 | End: 2023-12-01 | Stop reason: HOSPADM

## 2023-01-01 RX ORDER — POLYETHYLENE GLYCOL 3350 17 G/17G
17 POWDER, FOR SOLUTION ORAL DAILY
Status: DISCONTINUED | OUTPATIENT
Start: 2023-01-01 | End: 2023-12-01 | Stop reason: HOSPADM

## 2023-01-01 RX ORDER — CARVEDILOL 3.12 MG/1
3.12 TABLET ORAL 2 TIMES DAILY
Status: CANCELLED | OUTPATIENT
Start: 2023-01-01

## 2023-01-01 RX ORDER — CEPHALEXIN 250 MG/1
250 CAPSULE ORAL 4 TIMES DAILY
Qty: 28 CAPSULE | Refills: 0 | Status: ON HOLD | OUTPATIENT
Start: 2023-01-01 | End: 2023-01-01 | Stop reason: ENTERED-IN-ERROR

## 2023-01-01 RX ADMIN — Medication 0.5 MCG/KG/MIN: at 00:32

## 2023-01-01 RX ADMIN — Medication 2 G: at 14:12

## 2023-01-01 RX ADMIN — SODIUM CHLORIDE, POTASSIUM CHLORIDE, SODIUM LACTATE AND CALCIUM CHLORIDE 1000 ML: 600; 310; 30; 20 INJECTION, SOLUTION INTRAVENOUS at 10:04

## 2023-01-01 RX ADMIN — ALBUMIN HUMAN 25 G: 0.05 INJECTION, SOLUTION INTRAVENOUS at 19:30

## 2023-01-01 RX ADMIN — DOPAMINE HYDROCHLORIDE 1 MCG/KG/MIN: 160 INJECTION, SOLUTION INTRAVENOUS at 04:06

## 2023-01-01 RX ADMIN — Medication 2 G: at 15:31

## 2023-01-01 RX ADMIN — AMIODARONE HYDROCHLORIDE 200 MG: 200 TABLET ORAL at 13:11

## 2023-01-01 RX ADMIN — CEPHALEXIN 500 MG: 250 CAPSULE ORAL at 21:07

## 2023-01-01 RX ADMIN — MAGNESIUM SULFATE HEPTAHYDRATE 2 G: 40 INJECTION, SOLUTION INTRAVENOUS at 02:49

## 2023-01-01 RX ADMIN — Medication 2 G: at 02:12

## 2023-01-01 RX ADMIN — ACETAMINOPHEN 650 MG: 325 TABLET ORAL at 20:35

## 2023-01-01 RX ADMIN — VANCOMYCIN HYDROCHLORIDE 750 MG: 750 INJECTION, SOLUTION INTRAVENOUS at 19:58

## 2023-01-01 RX ADMIN — MEROPENEM 1000 MG: 1 INJECTION, POWDER, FOR SOLUTION INTRAVENOUS at 14:28

## 2023-01-01 RX ADMIN — VANCOMYCIN HYDROCHLORIDE 1.5 G: 1.5 INJECTION, POWDER, LYOPHILIZED, FOR SOLUTION INTRAVENOUS at 19:22

## 2023-01-01 RX ADMIN — CARVEDILOL 6.25 MG: 6.25 TABLET, FILM COATED ORAL at 20:35

## 2023-01-01 RX ADMIN — CLINDAMYCIN PHOSPHATE 900 MG: 900 INJECTION, SOLUTION INTRAVENOUS at 06:03

## 2023-01-01 RX ADMIN — CLINDAMYCIN PHOSPHATE 900 MG: 900 INJECTION, SOLUTION INTRAVENOUS at 06:33

## 2023-01-01 RX ADMIN — APIXABAN 2.5 MG: 2.5 TABLET, FILM COATED ORAL at 15:52

## 2023-01-01 RX ADMIN — CEFEPIME 2 G: 1 INJECTION, SOLUTION INTRAVENOUS at 06:22

## 2023-01-01 RX ADMIN — MIDAZOLAM 2 MG: 1 INJECTION INTRAMUSCULAR; INTRAVENOUS at 10:15

## 2023-01-01 RX ADMIN — AMIODARONE HYDROCHLORIDE 200 MG: 200 TABLET ORAL at 10:20

## 2023-01-01 RX ADMIN — DEXTROSE AND SODIUM CHLORIDE 30 ML/HR: 5; 900 INJECTION, SOLUTION INTRAVENOUS at 11:49

## 2023-01-01 RX ADMIN — CLINDAMYCIN PHOSPHATE 900 MG: 900 INJECTION, SOLUTION INTRAVENOUS at 22:59

## 2023-01-01 RX ADMIN — MIRTAZAPINE 7.5 MG: 7.5 TABLET, FILM COATED ORAL at 20:31

## 2023-01-01 RX ADMIN — RUGBY ZINC OXIDE 20% 1 APPLICATION: 20 OINTMENT TOPICAL at 22:23

## 2023-01-01 RX ADMIN — CLINDAMYCIN PHOSPHATE 900 MG: 900 INJECTION, SOLUTION INTRAVENOUS at 23:31

## 2023-01-01 RX ADMIN — Medication 400 MG: at 10:19

## 2023-01-01 RX ADMIN — CLINDAMYCIN PHOSPHATE 900 MG: 900 INJECTION, SOLUTION INTRAVENOUS at 15:39

## 2023-01-01 RX ADMIN — APIXABAN 2.5 MG: 2.5 TABLET, FILM COATED ORAL at 20:31

## 2023-01-01 RX ADMIN — AMIODARONE HYDROCHLORIDE 200 MG: 200 TABLET ORAL at 15:53

## 2023-01-01 RX ADMIN — CEFEPIME 2 G: 1 INJECTION, SOLUTION INTRAVENOUS at 17:00

## 2023-01-01 RX ADMIN — RUGBY ZINC OXIDE 20% 1 APPLICATION: 20 OINTMENT TOPICAL at 15:13

## 2023-01-01 RX ADMIN — FUROSEMIDE 40 MG: 10 INJECTION, SOLUTION INTRAVENOUS at 15:06

## 2023-01-01 RX ADMIN — CLINDAMYCIN PHOSPHATE 900 MG: 900 INJECTION, SOLUTION INTRAVENOUS at 15:13

## 2023-01-01 RX ADMIN — NOREPINEPHRINE BITARTRATE 0.2 MCG/KG/MIN: 8 INJECTION, SOLUTION INTRAVENOUS at 08:42

## 2023-01-01 RX ADMIN — APIXABAN 2.5 MG: 2.5 TABLET, FILM COATED ORAL at 20:35

## 2023-01-01 RX ADMIN — POLYETHYLENE GLYCOL 3350 17 G: 17 POWDER, FOR SOLUTION ORAL at 10:21

## 2023-01-01 RX ADMIN — Medication 0.06 MCG/KG/MIN: at 12:45

## 2023-01-01 RX ADMIN — POTASSIUM CHLORIDE 40 MEQ: 750 TABLET, FILM COATED, EXTENDED RELEASE ORAL at 19:52

## 2023-01-01 RX ADMIN — Medication 2 G: at 02:38

## 2023-01-01 RX ADMIN — SODIUM CHLORIDE 500 ML: 9 INJECTION, SOLUTION INTRAVENOUS at 03:24

## 2023-01-01 RX ADMIN — FUROSEMIDE 40 MG: 10 INJECTION, SOLUTION INTRAMUSCULAR; INTRAVENOUS at 16:36

## 2023-01-01 RX ADMIN — ALPRAZOLAM 0.25 MG: 0.25 TABLET ORAL at 20:35

## 2023-01-01 RX ADMIN — CEFEPIME 2 G: 1 INJECTION, SOLUTION INTRAVENOUS at 07:47

## 2023-01-01 RX ADMIN — VANCOMYCIN HYDROCHLORIDE 750 MG: 750 INJECTION, SOLUTION INTRAVENOUS at 22:15

## 2023-01-01 RX ADMIN — RUGBY ZINC OXIDE 20% 1 APPLICATION: 20 OINTMENT TOPICAL at 08:48

## 2023-01-01 RX ADMIN — IOHEXOL 100 ML: 350 INJECTION, SOLUTION INTRAVENOUS at 19:00

## 2023-01-01 RX ADMIN — FUROSEMIDE 80 MG: 10 INJECTION, SOLUTION INTRAMUSCULAR; INTRAVENOUS at 12:10

## 2023-01-01 RX ADMIN — Medication 400 MG: at 13:12

## 2023-01-01 RX ADMIN — SODIUM CHLORIDE, POTASSIUM CHLORIDE, SODIUM LACTATE AND CALCIUM CHLORIDE 1000 ML: 600; 310; 30; 20 INJECTION, SOLUTION INTRAVENOUS at 19:26

## 2023-01-01 RX ADMIN — FUROSEMIDE 40 MG: 40 TABLET ORAL at 13:12

## 2023-01-01 RX ADMIN — PIPERACILLIN SODIUM AND TAZOBACTAM SODIUM 4.5 G: 4; .5 INJECTION, SOLUTION INTRAVENOUS at 21:22

## 2023-01-01 RX ADMIN — VANCOMYCIN HYDROCHLORIDE 750 MG: 750 INJECTION, SOLUTION INTRAVENOUS at 21:58

## 2023-01-01 RX ADMIN — Medication 3 L/MIN: at 05:00

## 2023-01-01 RX ADMIN — SODIUM CHLORIDE 100 ML/HR: 9 INJECTION, SOLUTION INTRAVENOUS at 06:22

## 2023-01-01 RX ADMIN — MIDAZOLAM HYDROCHLORIDE 2 MG: 1 INJECTION, SOLUTION INTRAMUSCULAR; INTRAVENOUS at 10:15

## 2023-01-01 RX ADMIN — LORAZEPAM 0.5 MG: 2 INJECTION INTRAMUSCULAR; INTRAVENOUS at 11:48

## 2023-01-01 RX ADMIN — Medication 2 MCG/KG/MIN: at 06:58

## 2023-01-01 RX ADMIN — Medication 1.5 MCG/KG/MIN: at 02:44

## 2023-01-01 SDOH — SOCIAL STABILITY: SOCIAL INSECURITY: ABUSE: ADULT

## 2023-01-01 SDOH — SOCIAL STABILITY: SOCIAL INSECURITY: WERE YOU ABLE TO COMPLETE ALL THE BEHAVIORAL HEALTH SCREENINGS?: YES

## 2023-01-01 SDOH — ECONOMIC STABILITY: FOOD INSECURITY: WITHIN THE PAST 12 MONTHS, YOU WORRIED THAT YOUR FOOD WOULD RUN OUT BEFORE YOU GOT MONEY TO BUY MORE.: NEVER TRUE

## 2023-01-01 SDOH — ECONOMIC STABILITY: FOOD INSECURITY: WITHIN THE PAST 12 MONTHS, THE FOOD YOU BOUGHT JUST DIDN'T LAST AND YOU DIDN'T HAVE MONEY TO GET MORE.: NEVER TRUE

## 2023-01-01 SDOH — SOCIAL STABILITY: SOCIAL INSECURITY: ARE YOU OR HAVE YOU BEEN THREATENED OR ABUSED PHYSICALLY, EMOTIONALLY, OR SEXUALLY BY ANYONE?: NO

## 2023-01-01 SDOH — SOCIAL STABILITY: SOCIAL INSECURITY: HAS ANYONE EVER THREATENED TO HURT YOUR FAMILY OR YOUR PETS?: NO

## 2023-01-01 SDOH — SOCIAL STABILITY: SOCIAL INSECURITY: HAVE YOU HAD THOUGHTS OF HARMING ANYONE ELSE?: NO

## 2023-01-01 SDOH — SOCIAL STABILITY: SOCIAL INSECURITY: ARE THERE ANY APPARENT SIGNS OF INJURIES/BEHAVIORS THAT COULD BE RELATED TO ABUSE/NEGLECT?: NO

## 2023-01-01 SDOH — SOCIAL STABILITY: SOCIAL INSECURITY: DO YOU FEEL ANYONE HAS EXPLOITED OR TAKEN ADVANTAGE OF YOU FINANCIALLY OR OF YOUR PERSONAL PROPERTY?: NO

## 2023-01-01 SDOH — SOCIAL STABILITY: SOCIAL INSECURITY: DOES ANYONE TRY TO KEEP YOU FROM HAVING/CONTACTING OTHER FRIENDS OR DOING THINGS OUTSIDE YOUR HOME?: NO

## 2023-01-01 SDOH — SOCIAL STABILITY: SOCIAL INSECURITY: DO YOU FEEL UNSAFE GOING BACK TO THE PLACE WHERE YOU ARE LIVING?: NO

## 2023-01-01 ASSESSMENT — ACTIVITIES OF DAILY LIVING (ADL)
BATHING: NEEDS ASSISTANCE
FEEDING YOURSELF: NEEDS ASSISTANCE
ADEQUATE_TO_COMPLETE_ADL: YES
HEARING - RIGHT EAR: FUNCTIONAL
TOILETING: NEEDS ASSISTANCE
WALKS IN HOME: NEEDS ASSISTANCE
GROOMING: NEEDS ASSISTANCE
BATHING_ASSISTANCE: MAXIMAL
HEARING - LEFT EAR: FUNCTIONAL
ADL_ASSISTANCE: NEEDS ASSISTANCE
LACK_OF_TRANSPORTATION: NO
DRESSING YOURSELF: NEEDS ASSISTANCE
PATIENT'S MEMORY ADEQUATE TO SAFELY COMPLETE DAILY ACTIVITIES?: NO
LACK_OF_TRANSPORTATION: NO
JUDGMENT_ADEQUATE_SAFELY_COMPLETE_DAILY_ACTIVITIES: NO
ASSISTIVE_DEVICE: WALKER

## 2023-01-01 ASSESSMENT — COGNITIVE AND FUNCTIONAL STATUS - GENERAL
PERSONAL GROOMING: TOTAL
MOVING FROM LYING ON BACK TO SITTING ON SIDE OF FLAT BED WITH BEDRAILS: A LITTLE
DRESSING REGULAR LOWER BODY CLOTHING: TOTAL
DAILY ACTIVITIY SCORE: 6
STANDING UP FROM CHAIR USING ARMS: A LOT
MOVING TO AND FROM BED TO CHAIR: A LOT
MOVING TO AND FROM BED TO CHAIR: A LOT
HELP NEEDED FOR BATHING: A LOT
MOVING TO AND FROM BED TO CHAIR: A LOT
STANDING UP FROM CHAIR USING ARMS: A LOT
PERSONAL GROOMING: A LOT
DAILY ACTIVITIY SCORE: 11
PERSONAL GROOMING: TOTAL
CLIMB 3 TO 5 STEPS WITH RAILING: A LOT
TOILETING: TOTAL
HELP NEEDED FOR BATHING: TOTAL
DAILY ACTIVITIY SCORE: 12
TURNING FROM BACK TO SIDE WHILE IN FLAT BAD: A LOT
WALKING IN HOSPITAL ROOM: TOTAL
STANDING UP FROM CHAIR USING ARMS: A LITTLE
PERSONAL GROOMING: A LOT
HELP NEEDED FOR BATHING: TOTAL
TURNING FROM BACK TO SIDE WHILE IN FLAT BAD: TOTAL
PERSONAL GROOMING: A LOT
PERSONAL GROOMING: A LITTLE
MOVING TO AND FROM BED TO CHAIR: TOTAL
MOVING FROM LYING ON BACK TO SITTING ON SIDE OF FLAT BED WITH BEDRAILS: A LOT
STANDING UP FROM CHAIR USING ARMS: TOTAL
STANDING UP FROM CHAIR USING ARMS: A LOT
TURNING FROM BACK TO SIDE WHILE IN FLAT BAD: A LOT
WALKING IN HOSPITAL ROOM: A LOT
TURNING FROM BACK TO SIDE WHILE IN FLAT BAD: A LOT
DRESSING REGULAR LOWER BODY CLOTHING: TOTAL
TOILETING: TOTAL
PERSONAL GROOMING: A LOT
DRESSING REGULAR LOWER BODY CLOTHING: TOTAL
EATING MEALS: A LOT
CLIMB 3 TO 5 STEPS WITH RAILING: TOTAL
DRESSING REGULAR LOWER BODY CLOTHING: A LOT
DRESSING REGULAR LOWER BODY CLOTHING: A LITTLE
MOVING FROM LYING ON BACK TO SITTING ON SIDE OF FLAT BED WITH BEDRAILS: A LOT
HELP NEEDED FOR BATHING: TOTAL
MOBILITY SCORE: 11
DRESSING REGULAR UPPER BODY CLOTHING: A LOT
HELP NEEDED FOR BATHING: TOTAL
EATING MEALS: TOTAL
CLIMB 3 TO 5 STEPS WITH RAILING: TOTAL
DAILY ACTIVITIY SCORE: 13
MOBILITY SCORE: 6
WALKING IN HOSPITAL ROOM: A LOT
CLIMB 3 TO 5 STEPS WITH RAILING: TOTAL
PATIENT BASELINE BEDBOUND: NO
HELP NEEDED FOR BATHING: A LOT
DAILY ACTIVITIY SCORE: 6
DRESSING REGULAR LOWER BODY CLOTHING: A LOT
TOILETING: TOTAL
EATING MEALS: TOTAL
CLIMB 3 TO 5 STEPS WITH RAILING: TOTAL
EATING MEALS: A LITTLE
TOILETING: TOTAL
MOBILITY SCORE: 11
DRESSING REGULAR UPPER BODY CLOTHING: A LITTLE
TOILETING: TOTAL
MOBILITY SCORE: 17
DAILY ACTIVITIY SCORE: 19
STANDING UP FROM CHAIR USING ARMS: A LOT
TURNING FROM BACK TO SIDE WHILE IN FLAT BAD: A LOT
DAILY ACTIVITIY SCORE: 9
MOVING FROM LYING ON BACK TO SITTING ON SIDE OF FLAT BED WITH BEDRAILS: TOTAL
EATING MEALS: A LITTLE
DAILY ACTIVITIY SCORE: 9
MOVING TO AND FROM BED TO CHAIR: A LOT
TURNING FROM BACK TO SIDE WHILE IN FLAT BAD: A LOT
EATING MEALS: A LOT
DRESSING REGULAR UPPER BODY CLOTHING: A LOT
TOILETING: A LITTLE
EATING MEALS: A LOT
WALKING IN HOSPITAL ROOM: TOTAL
DRESSING REGULAR LOWER BODY CLOTHING: TOTAL
TOILETING: A LOT
MOVING TO AND FROM BED TO CHAIR: A LOT
DRESSING REGULAR UPPER BODY CLOTHING: TOTAL
MOVING FROM LYING ON BACK TO SITTING ON SIDE OF FLAT BED WITH BEDRAILS: A LOT
CLIMB 3 TO 5 STEPS WITH RAILING: TOTAL
PERSONAL GROOMING: A LOT
MOBILITY SCORE: 11
DRESSING REGULAR UPPER BODY CLOTHING: TOTAL
MOBILITY SCORE: 11
DRESSING REGULAR LOWER BODY CLOTHING: TOTAL
DRESSING REGULAR UPPER BODY CLOTHING: A LITTLE
DRESSING REGULAR UPPER BODY CLOTHING: A LOT
TURNING FROM BACK TO SIDE WHILE IN FLAT BAD: A LITTLE
WALKING IN HOSPITAL ROOM: A LOT
HELP NEEDED FOR BATHING: TOTAL
STANDING UP FROM CHAIR USING ARMS: A LOT
CLIMB 3 TO 5 STEPS WITH RAILING: TOTAL
DRESSING REGULAR UPPER BODY CLOTHING: A LOT
MOBILITY SCORE: 10
TOILETING: A LOT
HELP NEEDED FOR BATHING: A LITTLE
MOVING TO AND FROM BED TO CHAIR: A LOT
MOVING FROM LYING ON BACK TO SITTING ON SIDE OF FLAT BED WITH BEDRAILS: A LOT
WALKING IN HOSPITAL ROOM: TOTAL
MOVING FROM LYING ON BACK TO SITTING ON SIDE OF FLAT BED WITH BEDRAILS: A LITTLE

## 2023-01-01 ASSESSMENT — PATIENT HEALTH QUESTIONNAIRE - PHQ9
2. FEELING DOWN, DEPRESSED OR HOPELESS: NOT AT ALL
1. LITTLE INTEREST OR PLEASURE IN DOING THINGS: NOT AT ALL
2. FEELING DOWN, DEPRESSED OR HOPELESS: NOT AT ALL
1. LITTLE INTEREST OR PLEASURE IN DOING THINGS: SEVERAL DAYS
SUM OF ALL RESPONSES TO PHQ9 QUESTIONS 1 AND 2: 0
1. LITTLE INTEREST OR PLEASURE IN DOING THINGS: NOT AT ALL
SUM OF ALL RESPONSES TO PHQ9 QUESTIONS 1 AND 2: 0
1. LITTLE INTEREST OR PLEASURE IN DOING THINGS: NOT AT ALL
2. FEELING DOWN, DEPRESSED OR HOPELESS: NOT AT ALL
1. LITTLE INTEREST OR PLEASURE IN DOING THINGS: NOT AT ALL
2. FEELING DOWN, DEPRESSED OR HOPELESS: NOT AT ALL
SUM OF ALL RESPONSES TO PHQ9 QUESTIONS 1 AND 2: 0
1. LITTLE INTEREST OR PLEASURE IN DOING THINGS: NOT AT ALL
2. FEELING DOWN, DEPRESSED OR HOPELESS: NOT AT ALL
SUM OF ALL RESPONSES TO PHQ9 QUESTIONS 1 & 2: 1
2. FEELING DOWN, DEPRESSED OR HOPELESS: NOT AT ALL

## 2023-01-01 ASSESSMENT — PAIN - FUNCTIONAL ASSESSMENT
PAIN_FUNCTIONAL_ASSESSMENT: 0-10
PAIN_FUNCTIONAL_ASSESSMENT: CPOT (CRITICAL CARE PAIN OBSERVATION TOOL)
PAIN_FUNCTIONAL_ASSESSMENT: 0-10
PAIN_FUNCTIONAL_ASSESSMENT: CPOT (CRITICAL CARE PAIN OBSERVATION TOOL)
PAIN_FUNCTIONAL_ASSESSMENT: 0-10
PAIN_FUNCTIONAL_ASSESSMENT: CPOT (CRITICAL CARE PAIN OBSERVATION TOOL)

## 2023-01-01 ASSESSMENT — ENCOUNTER SYMPTOMS
DIZZINESS: 0
ABDOMINAL PAIN: 0
ABDOMINAL DISTENTION: 0
WOUND: 1
WEAKNESS: 0
SHORTNESS OF BREATH: 1
WHEEZING: 0
WEAKNESS: 0
LOSS OF SENSATION IN FEET: 0
PALPITATIONS: 0
FATIGUE: 0
VOMITING: 0
CONFUSION: 1
WHEEZING: 0
CONFUSION: 0
WEAKNESS: 1
BACK PAIN: 0
SORE THROAT: 0
HEMATURIA: 0
WOUND: 1
DEPRESSION: 0
FEVER: 0
LIGHT-HEADEDNESS: 0
DEPRESSION: 0
HEMATURIA: 0
LOSS OF SENSATION IN FEET: 0
SHORTNESS OF BREATH: 1
CHILLS: 0
CONFUSION: 0
CHILLS: 0
WEAKNESS: 0
WHEEZING: 0
CHEST TIGHTNESS: 0
COUGH: 1
FEVER: 0
COUGH: 0
DEPRESSION: 0
BRUISES/BLEEDS EASILY: 0
BLOOD IN STOOL: 0
LOSS OF SENSATION IN FEET: 0
FLANK PAIN: 0
DYSURIA: 0
APPETITE CHANGE: 0
WHEEZING: 0
NUMBNESS: 0
HEMATURIA: 0
EYES NEGATIVE: 1
HALLUCINATIONS: 0
LIGHT-HEADEDNESS: 0
BLOOD IN STOOL: 0
PALPITATIONS: 0
EYES NEGATIVE: 1
HEADACHES: 0
VOMITING: 1
SHORTNESS OF BREATH: 0
CONFUSION: 1
SHORTNESS OF BREATH: 1
HEMATURIA: 0
DEPRESSION: 0
COUGH: 0
OCCASIONAL FEELINGS OF UNSTEADINESS: 0
EYE PAIN: 0
DIAPHORESIS: 0
FACIAL SWELLING: 0
OCCASIONAL FEELINGS OF UNSTEADINESS: 0
COUGH: 1
OCCASIONAL FEELINGS OF UNSTEADINESS: 0
ACTIVITY CHANGE: 0
CONFUSION: 0
LOSS OF SENSATION IN FEET: 0
PALPITATIONS: 0
DIARRHEA: 0
ACTIVITY CHANGE: 0
PALPITATIONS: 0
FEVER: 0
CHILLS: 0
LIGHT-HEADEDNESS: 0
LOSS OF SENSATION IN FEET: 0
LIGHT-HEADEDNESS: 0
BLOOD IN STOOL: 0
WEAKNESS: 1
LOSS OF SENSATION IN FEET: 1
EYES NEGATIVE: 1
JOINT SWELLING: 0
ACTIVITY CHANGE: 0
NAUSEA: 0
OCCASIONAL FEELINGS OF UNSTEADINESS: 0
ACTIVITY CHANGE: 1
FEVER: 0
TROUBLE SWALLOWING: 0
CHEST TIGHTNESS: 0
FREQUENCY: 0
DEPRESSION: 0
OCCASIONAL FEELINGS OF UNSTEADINESS: 1
CONSTIPATION: 0
CHEST TIGHTNESS: 0
ABDOMINAL DISTENTION: 0
BLOOD IN STOOL: 0
OCCASIONAL FEELINGS OF UNSTEADINESS: 1
SHORTNESS OF BREATH: 1
DEPRESSION: 0
CHEST TIGHTNESS: 0
ABDOMINAL DISTENTION: 0
CHILLS: 0
TREMORS: 1

## 2023-01-01 ASSESSMENT — COLUMBIA-SUICIDE SEVERITY RATING SCALE - C-SSRS
2. HAVE YOU ACTUALLY HAD ANY THOUGHTS OF KILLING YOURSELF?: NO
6. HAVE YOU EVER DONE ANYTHING, STARTED TO DO ANYTHING, OR PREPARED TO DO ANYTHING TO END YOUR LIFE?: NO
1. IN THE PAST MONTH, HAVE YOU WISHED YOU WERE DEAD OR WISHED YOU COULD GO TO SLEEP AND NOT WAKE UP?: NO
6. HAVE YOU EVER DONE ANYTHING, STARTED TO DO ANYTHING, OR PREPARED TO DO ANYTHING TO END YOUR LIFE?: NO
1. IN THE PAST MONTH, HAVE YOU WISHED YOU WERE DEAD OR WISHED YOU COULD GO TO SLEEP AND NOT WAKE UP?: NO
6. HAVE YOU EVER DONE ANYTHING, STARTED TO DO ANYTHING, OR PREPARED TO DO ANYTHING TO END YOUR LIFE?: NO
1. IN THE PAST MONTH, HAVE YOU WISHED YOU WERE DEAD OR WISHED YOU COULD GO TO SLEEP AND NOT WAKE UP?: NO
2. HAVE YOU ACTUALLY HAD ANY THOUGHTS OF KILLING YOURSELF?: NO
6. HAVE YOU EVER DONE ANYTHING, STARTED TO DO ANYTHING, OR PREPARED TO DO ANYTHING TO END YOUR LIFE?: NO
6. HAVE YOU EVER DONE ANYTHING, STARTED TO DO ANYTHING, OR PREPARED TO DO ANYTHING TO END YOUR LIFE?: NO
2. HAVE YOU ACTUALLY HAD ANY THOUGHTS OF KILLING YOURSELF?: NO
1. IN THE PAST MONTH, HAVE YOU WISHED YOU WERE DEAD OR WISHED YOU COULD GO TO SLEEP AND NOT WAKE UP?: NO
1. IN THE PAST MONTH, HAVE YOU WISHED YOU WERE DEAD OR WISHED YOU COULD GO TO SLEEP AND NOT WAKE UP?: NO
2. HAVE YOU ACTUALLY HAD ANY THOUGHTS OF KILLING YOURSELF?: NO
2. HAVE YOU ACTUALLY HAD ANY THOUGHTS OF KILLING YOURSELF?: NO

## 2023-01-01 ASSESSMENT — LIFESTYLE VARIABLES
HOW OFTEN DO YOU HAVE A DRINK CONTAINING ALCOHOL: NEVER
HAVE YOU EVER FELT YOU SHOULD CUT DOWN ON YOUR DRINKING: NO
HAVE PEOPLE ANNOYED YOU BY CRITICIZING YOUR DRINKING: NO
HOW OFTEN DO YOU HAVE 6 OR MORE DRINKS ON ONE OCCASION: NEVER
SKIP TO QUESTIONS 9-10: 1
SUBSTANCE_ABUSE_PAST_12_MONTHS: NO
EVER HAD A DRINK FIRST THING IN THE MORNING TO STEADY YOUR NERVES TO GET RID OF A HANGOVER: NO
EVER HAD A DRINK FIRST THING IN THE MORNING TO STEADY YOUR NERVES TO GET RID OF A HANGOVER: NO
HOW MANY STANDARD DRINKS CONTAINING ALCOHOL DO YOU HAVE ON A TYPICAL DAY: PATIENT DOES NOT DRINK
HAVE PEOPLE ANNOYED YOU BY CRITICIZING YOUR DRINKING: NO
PRESCIPTION_ABUSE_PAST_12_MONTHS: NO
AUDIT-C TOTAL SCORE: 0
AUDIT-C TOTAL SCORE: 0
EVER FELT BAD OR GUILTY ABOUT YOUR DRINKING: NO
EVER FELT BAD OR GUILTY ABOUT YOUR DRINKING: NO
HAVE YOU EVER FELT YOU SHOULD CUT DOWN ON YOUR DRINKING: NO

## 2023-01-01 ASSESSMENT — PAIN DESCRIPTION - PAIN TYPE: TYPE: ACUTE PAIN

## 2023-01-01 ASSESSMENT — PAIN SCALES - GENERAL
PAINLEVEL: 0-NO PAIN
PAINLEVEL_OUTOF10: 0 - NO PAIN
PAINLEVEL_OUTOF10: 5 - MODERATE PAIN
PAINLEVEL_OUTOF10: 0 - NO PAIN
PAINLEVEL_OUTOF10: 2
PAINLEVEL_OUTOF10: 0 - NO PAIN

## 2023-01-01 ASSESSMENT — PAIN DESCRIPTION - ORIENTATION: ORIENTATION: LEFT

## 2023-01-01 ASSESSMENT — PAIN DESCRIPTION - LOCATION: LOCATION: FOOT

## 2023-03-20 NOTE — LETTER
Patient: Patricia Jewell  : 1935    Encounter Date: 2023    History and Physical  Subjective  Chief complaint: Patricia Jewell is a 87 y.o. female who is a acute skilled care patient being seen and evaluated for multiple medical problems.  Patient presents for weakness    HPI:  87 years old white female history of fracture left shoulder falls, lymphoma, DVT, PE patient admitted to SNF for therapy after recent hospitalization for right ankle fx s/p ORIF.  Patient denies pain.  She is working in PT OT and ST.  Skilled interventions focused on safe transfers.  Patient is able to perform stand pivot transfers with front wheel walker with contact-guard assist.  Speech therapy is working with her on cognitive communication skills.             Past Medical History:   Diagnosis Date   • Fracture of left shoulder girdle, part unspecified, subsequent encounter for fracture with delayed healing     Closed fracture of left shoulder with delayed healing, subsequent encounter   • History of falling     History of fall   • Personal history of non-Hodgkin lymphomas     History of non-Hodgkin's lymphoma   • Personal history of other venous thrombosis and embolism     History of DVT (deep vein thrombosis)   • Personal history of pulmonary embolism     History of pulmonary embolism       Past Surgical History:   Procedure Laterality Date   • CATARACT EXTRACTION  10/10/2018    Cataract Surgery   • HERNIA REPAIR  10/05/2016    Inguinal Hernia Repair   • HYSTERECTOMY  2016    Hysterectomy   • OTHER SURGICAL HISTORY  2016    Thoracentesis (Therapeutic)   • OTHER SURGICAL HISTORY  2016    Interruption Inferior Vena Cava Bhupnedra Filter Placement       No family history on file.  No history of PE or DVT in the family  Social History     Socioeconomic History   • Marital status:      Spouse name: Not on file   • Number of children: Not on file   • Years of education: Not on file   • Highest education  level: Not on file   Occupational History   • Not on file   Tobacco Use   • Smoking status: Not on file   • Smokeless tobacco: Not on file   Vaping Use   • Vaping status: Not on file   Substance and Sexual Activity   • Alcohol use: Not on file   • Drug use: Not on file   • Sexual activity: Not on file   Other Topics Concern   • Not on file   Social History Narrative   • Not on file     Social Determinants of Health     Financial Resource Strain: Not on file   Food Insecurity: Not on file   Transportation Needs: Not on file   Physical Activity: Not on file   Stress: Not on file   Social Connections: Not on file   Intimate Partner Violence: Not on file   Housing Stability: Not on file   Social history does not smoke or drink at the present time    Vital signs: 132/80 2-81-18    Objective  Physical Exam  Constitutional:       General: She is not in acute distress.  Eyes:      Extraocular Movements: Extraocular movements intact.   Cardiovascular:      Rate and Rhythm: Regular rhythm.   Pulmonary:      Effort: Pulmonary effort is normal.      Breath sounds: Normal breath sounds.   Abdominal:      General: Bowel sounds are normal.      Palpations: Abdomen is soft.   Musculoskeletal:      Cervical back: Neck supple.      Right lower leg: No edema.      Left lower leg: No edema.      Comments: RLE soft splint  Generalized weakness   Neurological:      Mental Status: She is alert.   Psychiatric:         Mood and Affect: Mood normal.         Behavior: Behavior is cooperative.         Assessment/Plan  Problem List Items Addressed This Visit          Circulatory    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)     Continue diuretic  Monitor weight         Paroxysmal atrial fibrillation (CMS/HCC)     Heart rate controlled  Anticoagulant  Monitor for bleeding           Hypertension, essential     Controlled  Continue antihypertensives  Continue to monitor blood pressure            Musculoskeletal    Closed  fracture of right lower leg with routine healing     Pain meds  Therapy  Follow-up with Ortho            Endocrine/Metabolic    Type 2 diabetes mellitus without complication, without long-term current use of insulin (CMS/[facility])       Other    Weakness - Primary     Continue therapy        Medications, treatments, and labs reviewed  Continue medications and treatments as listed in PCC    Darcie Mcdermott MD      Electronically Signed By: Darcie Mcdermott MD   3/25/23  4:56 PM

## 2023-03-20 NOTE — LETTER
Patient: Patricia Jewell  : 1935    Encounter Date: 2023    History and Physical  Subjective  Chief complaint: Patricia Jewell is a 87 y.o. female who is a acute skilled care patient being seen and evaluated for multiple medical problems.  Patient presents for weakness    HPI:  87 years old white female history of fracture left shoulder falls, lymphoma, DVT, PE patient admitted to SNF for therapy after recent hospitalization for right ankle fx s/p ORIF.  Patient denies pain.  She is working in PT OT and ST.  Skilled interventions focused on safe transfers.  Patient is able to perform stand pivot transfers with front wheel walker with contact-guard assist.  Speech therapy is working with her on cognitive communication skills.             Past Medical History:   Diagnosis Date   • Fracture of left shoulder girdle, part unspecified, subsequent encounter for fracture with delayed healing     Closed fracture of left shoulder with delayed healing, subsequent encounter   • History of falling     History of fall   • Personal history of non-Hodgkin lymphomas     History of non-Hodgkin's lymphoma   • Personal history of other venous thrombosis and embolism     History of DVT (deep vein thrombosis)   • Personal history of pulmonary embolism     History of pulmonary embolism       Past Surgical History:   Procedure Laterality Date   • CATARACT EXTRACTION  10/10/2018    Cataract Surgery   • HERNIA REPAIR  10/05/2016    Inguinal Hernia Repair   • HYSTERECTOMY  2016    Hysterectomy   • OTHER SURGICAL HISTORY  2016    Thoracentesis (Therapeutic)   • OTHER SURGICAL HISTORY  2016    Interruption Inferior Vena Cava Bhupendra Filter Placement       No family history on file.  No history of PE or DVT in the family  Social History     Socioeconomic History   • Marital status:      Spouse name: Not on file   • Number of children: Not on file   • Years of education: Not on file   • Highest education  level: Not on file   Occupational History   • Not on file   Tobacco Use   • Smoking status: Not on file   • Smokeless tobacco: Not on file   Vaping Use   • Vaping status: Not on file   Substance and Sexual Activity   • Alcohol use: Not on file   • Drug use: Not on file   • Sexual activity: Not on file   Other Topics Concern   • Not on file   Social History Narrative   • Not on file     Social Determinants of Health     Financial Resource Strain: Not on file   Food Insecurity: Not on file   Transportation Needs: Not on file   Physical Activity: Not on file   Stress: Not on file   Social Connections: Not on file   Intimate Partner Violence: Not on file   Housing Stability: Not on file   Social history does not smoke or drink at the present time    Vital signs: 132/80 2-81-18    Objective  Physical Exam  Constitutional:       General: She is not in acute distress.  Eyes:      Extraocular Movements: Extraocular movements intact.   Cardiovascular:      Rate and Rhythm: Regular rhythm.   Pulmonary:      Effort: Pulmonary effort is normal.      Breath sounds: Normal breath sounds.   Abdominal:      General: Bowel sounds are normal.      Palpations: Abdomen is soft.   Musculoskeletal:      Cervical back: Neck supple.      Right lower leg: No edema.      Left lower leg: No edema.      Comments: RLE soft splint  Generalized weakness   Neurological:      Mental Status: She is alert.   Psychiatric:         Mood and Affect: Mood normal.         Behavior: Behavior is cooperative.         Assessment/Plan  Problem List Items Addressed This Visit          Circulatory    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) - Primary    Paroxysmal atrial fibrillation (CMS/HCC)    Hypertension, essential       Musculoskeletal    Closed fracture of right lower leg with routine healing       Endocrine/Metabolic    Type 2 diabetes mellitus without complication, without long-term current use of insulin (CMS/HCC)       Other     Weakness     Medications, treatments, and labs reviewed  Continue medications and treatments as listed in PCC    Darcie Mcdermott MD      Electronically Signed By: Darcie Mcdermott MD   3/25/23  3:28 PM

## 2023-03-21 NOTE — LETTER
Patient: Patricia Jewell  : 1935    Encounter Date: 2023    Subjective  Chief complaint: Patricia Jewell is a 87 y.o. female who is a acute skilled care  presents for weakness.  HPI:  Patient presents for weakness.  Patient examined today at bedside.  Patient continues working in therapy.  Patient denies any pain at this comfort or discomfort.  Patient has no new concerns.  Therapy reports that patient is able to ambulate with front wheel walker contact-guard assist.  Patient able to perform sit to stands and front wheel walker with contact-guard assist.  Staff reports no new issues.  No acute distress.        Review of Systems  All systems reviewed and negative except for what was mentioned in the HPI    Vital signs: 139/80, 97.4, 63, 18    Objective  Physical Exam  Constitutional:       General: She is not in acute distress.  Eyes:      Extraocular Movements: Extraocular movements intact.   Cardiovascular:      Rate and Rhythm: Regular rhythm.   Pulmonary:      Effort: Pulmonary effort is normal.      Breath sounds: Normal breath sounds.   Abdominal:      General: Bowel sounds are normal.      Palpations: Abdomen is soft.   Musculoskeletal:      Cervical back: Neck supple.      Right lower leg: No edema.      Left lower leg: No edema.   Neurological:      Mental Status: She is alert.      Motor: Weakness present.   Psychiatric:         Mood and Affect: Mood normal.         Behavior: Behavior is cooperative.         Assessment/Plan  Problem List Items Addressed This Visit       Closed fracture of right lower leg with routine healing     pain control.  Continue PT OT         Hypertension, essential     Continue to monitor blood pressure.  Continue antihypertensive meds.         Paroxysmal atrial fibrillation (CMS/[facility])     A-fib controlled.  Continue apixaban         Weakness - Primary     Positive for weakness.  Continue PT OT          Medications, treatments, and labs reviewed  Continue medications and  treatments as listed in [facility]    Scribe Attestation  I, Georgina cummings   attest that this documentation has been prepared under the direction and in the presence of Darcie Mcdermott MD.    Provider Attestation - Scribe documentation  All medical record entries made by the Scribe were at my direction and personally dictated by me. I have reviewed the chart and agree that the record accurately reflects my personal performance of the history, physical exam, discussion and plan.        Electronically Signed By: Darcie Mcdermott MD   3/30/23  6:33 PM

## 2023-03-22 PROBLEM — S82.91XD: Status: ACTIVE | Noted: 2023-01-01

## 2023-03-22 PROBLEM — R53.1 WEAKNESS: Status: ACTIVE | Noted: 2023-01-01

## 2023-03-22 PROBLEM — I10 HYPERTENSION, ESSENTIAL: Status: ACTIVE | Noted: 2023-01-01

## 2023-03-22 PROBLEM — E11.9 TYPE 2 DIABETES MELLITUS WITHOUT COMPLICATION, WITHOUT LONG-TERM CURRENT USE OF INSULIN (MULTI): Status: ACTIVE | Noted: 2023-01-01

## 2023-03-22 PROBLEM — I48.0 PAROXYSMAL ATRIAL FIBRILLATION (MULTI): Status: ACTIVE | Noted: 2023-01-01

## 2023-03-22 PROBLEM — I50.42 CHRONIC COMBINED SYSTOLIC AND DIASTOLIC HEART FAILURE (MULTI): Status: ACTIVE | Noted: 2023-01-01

## 2023-03-22 NOTE — LETTER
Patient: Patricia Jewell  : 1935    Encounter Date: 2023    Progress Note  Subjective  Chief complaint: Patricia Jewell is a 87 y.o. female who is a acute skilled care patient being seen and evaluated for weakness    HPI:  3/22/23 Patient admitted to SNF for therapy after recent hospitalization for right ankle fx s/p ORIF.  Patient denies pain.  She is working in PT OT and ST.  Skilled interventions focused on safe transfers.  Patient is able to perform stand pivot transfers with front wheel walker with contact-guard assist.  Speech therapy is working with her on cognitive communication skills.      Objective  Vital signs: 119/68, 97.6, 77, 18, 94%  Physical Exam  Constitutional:       General: She is not in acute distress.  Eyes:      Extraocular Movements: Extraocular movements intact.   Cardiovascular:      Rate and Rhythm: Regular rhythm.   Pulmonary:      Effort: Pulmonary effort is normal.      Breath sounds: Normal breath sounds.   Abdominal:      General: Bowel sounds are normal.      Palpations: Abdomen is soft.   Musculoskeletal:      Cervical back: Neck supple.      Right lower leg: No edema.      Left lower leg: No edema.      Comments: RLE soft splint  Generalized weakness   Neurological:      Mental Status: She is alert.   Psychiatric:         Mood and Affect: Mood normal.         Behavior: Behavior is cooperative.         Assessment/Plan  Problem List Items Addressed This Visit       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)     Continue diuretic  Monitor weight         Closed fracture of right lower leg with routine healing     Pain meds  Therapy  Follow-up with Ortho         Hypertension, essential     Controlled  Continue antihypertensives  Continue to monitor blood pressure         Paroxysmal atrial fibrillation (CMS/HCC)     Heart rate controlled  Anticoagulant  Monitor for bleeding           Weakness - Primary     Continue therapy          Medications,  treatments, and labs reviewed  Continue medications and treatments as listed in P[facility]    TEZ Wilson      Electronically Signed By: TEZ Wilson   3/22/23  2:23 PM

## 2023-03-22 NOTE — PROGRESS NOTES
Progress Note  Subjective   Chief complaint: Patricia Jewell is a 87 y.o. female who is a acute skilled care patient being seen and evaluated for weakness    HPI:  3/22/23 Patient admitted to SNF for therapy after recent hospitalization for right ankle fx s/p ORIF.  Patient denies pain.  She is working in PT OT and ST.  Skilled interventions focused on safe transfers.  Patient is able to perform stand pivot transfers with front wheel walker with contact-guard assist.  Speech therapy is working with her on cognitive communication skills.      Objective   Vital signs: 119/68, 97.6, 77, 18, 94%  Physical Exam  Constitutional:       General: She is not in acute distress.  Eyes:      Extraocular Movements: Extraocular movements intact.   Cardiovascular:      Rate and Rhythm: Regular rhythm.   Pulmonary:      Effort: Pulmonary effort is normal.      Breath sounds: Normal breath sounds.   Abdominal:      General: Bowel sounds are normal.      Palpations: Abdomen is soft.   Musculoskeletal:      Cervical back: Neck supple.      Right lower leg: No edema.      Left lower leg: No edema.      Comments: RLE soft splint  Generalized weakness   Neurological:      Mental Status: She is alert.   Psychiatric:         Mood and Affect: Mood normal.         Behavior: Behavior is cooperative.         Assessment/Plan   Problem List Items Addressed This Visit       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)     Continue diuretic  Monitor weight         Closed fracture of right lower leg with routine healing     Pain meds  Therapy  Follow-up with Ortho         Hypertension, essential     Controlled  Continue antihypertensives  Continue to monitor blood pressure         Paroxysmal atrial fibrillation (CMS/HCC)     Heart rate controlled  Anticoagulant  Monitor for bleeding           Weakness - Primary     Continue therapy          Medications, treatments, and labs reviewed  Continue medications and treatments as listed in  PCC    Sunita Wilson, APRN-CNP

## 2023-03-23 NOTE — PROGRESS NOTES
Progress Note  Subjective   Chief complaint: Patricia Jewell is a 87 y.o. female who is a acute skilled care patient being seen and evaluated for weakness    HPI:  3/22/23 Patient admitted to SNF for therapy after recent hospitalization for right ankle fx s/p ORIF.  Patient denies pain.  She is working in PT OT and ST.  Skilled interventions focused on safe transfers.  Patient is able to perform stand pivot transfers with front wheel walker with contact-guard assist.  Speech therapy is working with her on cognitive communication skills.    3/23/23 Patient working in therapy due to weakness. Patient is able to perform stand pivot transfers with front wheel walker with contact-guard assist.  Speech therapy is working with her on cognitive communication skills. No acute distress. Pain controlled.       Objective   Vital signs: 103/50, 60, 18, 93%  Physical Exam  Constitutional:       General: She is not in acute distress.  Eyes:      Extraocular Movements: Extraocular movements intact.   Cardiovascular:      Rate and Rhythm: Regular rhythm.   Pulmonary:      Effort: Pulmonary effort is normal.      Breath sounds: Normal breath sounds.   Abdominal:      General: Bowel sounds are normal.      Palpations: Abdomen is soft.   Musculoskeletal:      Cervical back: Neck supple.      Right lower leg: No edema.      Left lower leg: No edema.      Comments: RLE soft splint  Generalized weakness   Neurological:      Mental Status: She is alert.   Psychiatric:         Mood and Affect: Mood normal.         Behavior: Behavior is cooperative.         Assessment/Plan   Problem List Items Addressed This Visit          Musculoskeletal    Closed fracture of right lower leg with routine healing     Pain meds  Therapy  Follow-up with Ortho            Other    Weakness     Continue therapy        Medications, treatments, and labs reviewed  Continue medications and treatments as listed in P[facility]    Darcie Mcdermott MD    Review of Systems

## 2023-03-23 NOTE — LETTER
Patient: Patricia Jewell  : 1935    Encounter Date: 2023    Progress Note  Subjective  Chief complaint: Patricia Jewell is a 87 y.o. female who is a acute skilled care patient being seen and evaluated for weakness    HPI:  3/22/23 Patient admitted to SNF for therapy after recent hospitalization for right ankle fx s/p ORIF.  Patient denies pain.  She is working in PT OT and ST.  Skilled interventions focused on safe transfers.  Patient is able to perform stand pivot transfers with front wheel walker with contact-guard assist.  Speech therapy is working with her on cognitive communication skills.    3/23/23 Patient working in therapy due to weakness. Patient is able to perform stand pivot transfers with front wheel walker with contact-guard assist.  Speech therapy is working with her on cognitive communication skills. No acute distress. Pain controlled.       Objective  Vital signs: 103/50, 60, 18, 93%  Physical Exam  Constitutional:       General: She is not in acute distress.  Eyes:      Extraocular Movements: Extraocular movements intact.   Cardiovascular:      Rate and Rhythm: Regular rhythm.   Pulmonary:      Effort: Pulmonary effort is normal.      Breath sounds: Normal breath sounds.   Abdominal:      General: Bowel sounds are normal.      Palpations: Abdomen is soft.   Musculoskeletal:      Cervical back: Neck supple.      Right lower leg: No edema.      Left lower leg: No edema.      Comments: RLE soft splint  Generalized weakness   Neurological:      Mental Status: She is alert.   Psychiatric:         Mood and Affect: Mood normal.         Behavior: Behavior is cooperative.         Assessment/Plan  Problem List Items Addressed This Visit          Musculoskeletal    Closed fracture of right lower leg with routine healing     Pain meds  Therapy  Follow-up with Ortho            Other    Weakness     Continue therapy        Medications, treatments, and labs reviewed  Continue medications and  treatments as listed in P[facility]    Darcie Mcdermott MD    Review of Systems      Electronically Signed By: Darcie Mcdermott MD   3/24/23  6:37 PM

## 2023-03-24 NOTE — PROGRESS NOTES
Progress Note  Subjective   Chief complaint: Patricia Jewell is a 87 y.o. female who is a acute skilled care patient being seen and evaluated for weakness    HPI:  3/22/23 Patient admitted to SNF for therapy after recent hospitalization for right ankle fx s/p ORIF.  Patient denies pain.  She is working in PT OT and ST.  Skilled interventions focused on safe transfers.  Patient is able to perform stand pivot transfers with front wheel walker with contact-guard assist.  Speech therapy is working with her on cognitive communication skills.    3/23/23 Patient working in therapy due to weakness. Patient is able to perform stand pivot transfers with front wheel walker with contact-guard assist.  Speech therapy is working with her on cognitive communication skills. No acute distress. Pain controlled.     3/24/2023 patient continues to work towards goals in therapy.  She states that she is feeling okay.  Pain medication is effective when taken.  No new concerns today.      Objective   Vital signs: 129/66, 98.0, 78, 18, 94%  Physical Exam  Constitutional:       General: She is not in acute distress.  Eyes:      Extraocular Movements: Extraocular movements intact.   Cardiovascular:      Rate and Rhythm: Regular rhythm.   Pulmonary:      Effort: Pulmonary effort is normal.      Breath sounds: Normal breath sounds.   Abdominal:      General: Bowel sounds are normal.      Palpations: Abdomen is soft.   Musculoskeletal:      Cervical back: Neck supple.      Right lower leg: No edema.      Left lower leg: No edema.      Comments: RLE soft splint  Generalized weakness   Neurological:      Mental Status: She is alert.   Psychiatric:         Mood and Affect: Mood normal.         Behavior: Behavior is cooperative.         Assessment/Plan   Problem List Items Addressed This Visit       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)     Continue diuretic  Monitor weight         Closed fracture of right lower leg with  routine healing     Pain meds  Therapy  Follow-up with Ortho         Hypertension, essential     Controlled  Continue antihypertensives  Continue to monitor blood pressure         Paroxysmal atrial fibrillation (CMS/HCC)     Heart rate controlled  Anticoagulant  Monitor for bleeding           Weakness - Primary     Continue therapy        Medications, treatments, and labs reviewed  Continue medications and treatments as listed in [facility]    REBECA Wilson-CNP    Review of Systems

## 2023-03-24 NOTE — LETTER
Patient: Patricia Jewell  : 1935    Encounter Date: 2023    Progress Note  Subjective  Chief complaint: Patricia Jewell is a 87 y.o. female who is a acute skilled care patient being seen and evaluated for weakness    HPI:  3/22/23 Patient admitted to SNF for therapy after recent hospitalization for right ankle fx s/p ORIF.  Patient denies pain.  She is working in PT OT and ST.  Skilled interventions focused on safe transfers.  Patient is able to perform stand pivot transfers with front wheel walker with contact-guard assist.  Speech therapy is working with her on cognitive communication skills.    3/23/23 Patient working in therapy due to weakness. Patient is able to perform stand pivot transfers with front wheel walker with contact-guard assist.  Speech therapy is working with her on cognitive communication skills. No acute distress. Pain controlled.     3/24/2023 patient continues to work towards goals in therapy.  She states that she is feeling okay.  Pain medication is effective when taken.  No new concerns today.      Objective  Vital signs: 129/66, 98.0, 78, 18, 94%  Physical Exam  Constitutional:       General: She is not in acute distress.  Eyes:      Extraocular Movements: Extraocular movements intact.   Cardiovascular:      Rate and Rhythm: Regular rhythm.   Pulmonary:      Effort: Pulmonary effort is normal.      Breath sounds: Normal breath sounds.   Abdominal:      General: Bowel sounds are normal.      Palpations: Abdomen is soft.   Musculoskeletal:      Cervical back: Neck supple.      Right lower leg: No edema.      Left lower leg: No edema.      Comments: RLE soft splint  Generalized weakness   Neurological:      Mental Status: She is alert.   Psychiatric:         Mood and Affect: Mood normal.         Behavior: Behavior is cooperative.         Assessment/Plan  Problem List Items Addressed This Visit       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)     Continue  diuretic  Monitor weight         Closed fracture of right lower leg with routine healing     Pain meds  Therapy  Follow-up with Ortho         Hypertension, essential     Controlled  Continue antihypertensives  Continue to monitor blood pressure         Paroxysmal atrial fibrillation (CMS/HCC)     Heart rate controlled  Anticoagulant  Monitor for bleeding           Weakness - Primary     Continue therapy        Medications, treatments, and labs reviewed  Continue medications and treatments as listed in PCC    TEZ Wilson    Review of Systems      Electronically Signed By: TEZ Wilson   3/25/23  5:34 PM

## 2023-03-25 NOTE — PROGRESS NOTES
History and Physical  Subjective   Chief complaint: Patricia Jewell is a 87 y.o. female who is a acute skilled care patient being seen and evaluated for multiple medical problems.  Patient presents for weakness    HPI:  87 years old white female history of fracture left shoulder falls, lymphoma, DVT, PE patient admitted to SNF for therapy after recent hospitalization for right ankle fx s/p ORIF.  Patient denies pain.  She is working in PT OT and ST.  Skilled interventions focused on safe transfers.  Patient is able to perform stand pivot transfers with front wheel walker with contact-guard assist.  Speech therapy is working with her on cognitive communication skills.             Past Medical History:   Diagnosis Date    Fracture of left shoulder girdle, part unspecified, subsequent encounter for fracture with delayed healing     Closed fracture of left shoulder with delayed healing, subsequent encounter    History of falling     History of fall    Personal history of non-Hodgkin lymphomas     History of non-Hodgkin's lymphoma    Personal history of other venous thrombosis and embolism     History of DVT (deep vein thrombosis)    Personal history of pulmonary embolism     History of pulmonary embolism       Past Surgical History:   Procedure Laterality Date    CATARACT EXTRACTION  10/10/2018    Cataract Surgery    HERNIA REPAIR  10/05/2016    Inguinal Hernia Repair    HYSTERECTOMY  09/14/2016    Hysterectomy    OTHER SURGICAL HISTORY  09/14/2016    Thoracentesis (Therapeutic)    OTHER SURGICAL HISTORY  09/14/2016    Interruption Inferior Vena Cava Bhupendra Filter Placement       No family history on file.  No history of PE or DVT in the family  Social History     Socioeconomic History    Marital status:      Spouse name: Not on file    Number of children: Not on file    Years of education: Not on file    Highest education level: Not on file   Occupational History    Not on file   Tobacco Use    Smoking  status: Not on file    Smokeless tobacco: Not on file   Vaping Use    Vaping status: Not on file   Substance and Sexual Activity    Alcohol use: Not on file    Drug use: Not on file    Sexual activity: Not on file   Other Topics Concern    Not on file   Social History Narrative    Not on file     Social Determinants of Health     Financial Resource Strain: Not on file   Food Insecurity: Not on file   Transportation Needs: Not on file   Physical Activity: Not on file   Stress: Not on file   Social Connections: Not on file   Intimate Partner Violence: Not on file   Housing Stability: Not on file   Social history does not smoke or drink at the present time    Vital signs: 132/80 2-81-18    Objective   Physical Exam  Constitutional:       General: She is not in acute distress.  Eyes:      Extraocular Movements: Extraocular movements intact.   Cardiovascular:      Rate and Rhythm: Regular rhythm.   Pulmonary:      Effort: Pulmonary effort is normal.      Breath sounds: Normal breath sounds.   Abdominal:      General: Bowel sounds are normal.      Palpations: Abdomen is soft.   Musculoskeletal:      Cervical back: Neck supple.      Right lower leg: No edema.      Left lower leg: No edema.      Comments: RLE soft splint  Generalized weakness   Neurological:      Mental Status: She is alert.   Psychiatric:         Mood and Affect: Mood normal.         Behavior: Behavior is cooperative.         Assessment/Plan   Problem List Items Addressed This Visit          Circulatory    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)     Continue diuretic  Monitor weight         Paroxysmal atrial fibrillation (CMS/HCC)     Heart rate controlled  Anticoagulant  Monitor for bleeding           Hypertension, essential     Controlled  Continue antihypertensives  Continue to monitor blood pressure            Musculoskeletal    Closed fracture of right lower leg with routine healing     Pain meds  Therapy  Follow-up with  Ortho            Endocrine/Metabolic    Type 2 diabetes mellitus without complication, without long-term current use of insulin (CMS/HCC)       Other    Weakness - Primary     Continue therapy        Medications, treatments, and labs reviewed  Continue medications and treatments as listed in PCC    Darcie Mcdermott MD

## 2023-03-25 NOTE — PROGRESS NOTES
History and Physical  Subjective   Chief complaint: Patricia Jewell is a 87 y.o. female who is a acute skilled care patient being seen and evaluated for multiple medical problems.  Patient presents for weakness    HPI:  87 years old white female history of fracture left shoulder falls, lymphoma, DVT, PE patient admitted to SNF for therapy after recent hospitalization for right ankle fx s/p ORIF.  Patient denies pain.  She is working in PT OT and ST.  Skilled interventions focused on safe transfers.  Patient is able to perform stand pivot transfers with front wheel walker with contact-guard assist.  Speech therapy is working with her on cognitive communication skills.             Past Medical History:   Diagnosis Date    Fracture of left shoulder girdle, part unspecified, subsequent encounter for fracture with delayed healing     Closed fracture of left shoulder with delayed healing, subsequent encounter    History of falling     History of fall    Personal history of non-Hodgkin lymphomas     History of non-Hodgkin's lymphoma    Personal history of other venous thrombosis and embolism     History of DVT (deep vein thrombosis)    Personal history of pulmonary embolism     History of pulmonary embolism       Past Surgical History:   Procedure Laterality Date    CATARACT EXTRACTION  10/10/2018    Cataract Surgery    HERNIA REPAIR  10/05/2016    Inguinal Hernia Repair    HYSTERECTOMY  09/14/2016    Hysterectomy    OTHER SURGICAL HISTORY  09/14/2016    Thoracentesis (Therapeutic)    OTHER SURGICAL HISTORY  09/14/2016    Interruption Inferior Vena Cava Bhupendra Filter Placement       No family history on file.  No history of PE or DVT in the family  Social History     Socioeconomic History    Marital status:      Spouse name: Not on file    Number of children: Not on file    Years of education: Not on file    Highest education level: Not on file   Occupational History    Not on file   Tobacco Use    Smoking  status: Not on file    Smokeless tobacco: Not on file   Vaping Use    Vaping status: Not on file   Substance and Sexual Activity    Alcohol use: Not on file    Drug use: Not on file    Sexual activity: Not on file   Other Topics Concern    Not on file   Social History Narrative    Not on file     Social Determinants of Health     Financial Resource Strain: Not on file   Food Insecurity: Not on file   Transportation Needs: Not on file   Physical Activity: Not on file   Stress: Not on file   Social Connections: Not on file   Intimate Partner Violence: Not on file   Housing Stability: Not on file   Social history does not smoke or drink at the present time    Vital signs: 132/80 2-81-18    Objective   Physical Exam  Constitutional:       General: She is not in acute distress.  Eyes:      Extraocular Movements: Extraocular movements intact.   Cardiovascular:      Rate and Rhythm: Regular rhythm.   Pulmonary:      Effort: Pulmonary effort is normal.      Breath sounds: Normal breath sounds.   Abdominal:      General: Bowel sounds are normal.      Palpations: Abdomen is soft.   Musculoskeletal:      Cervical back: Neck supple.      Right lower leg: No edema.      Left lower leg: No edema.      Comments: RLE soft splint  Generalized weakness   Neurological:      Mental Status: She is alert.   Psychiatric:         Mood and Affect: Mood normal.         Behavior: Behavior is cooperative.         Assessment/Plan   Problem List Items Addressed This Visit          Circulatory    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) - Primary    Paroxysmal atrial fibrillation (CMS/HCC)    Hypertension, essential       Musculoskeletal    Closed fracture of right lower leg with routine healing       Endocrine/Metabolic    Type 2 diabetes mellitus without complication, without long-term current use of insulin (CMS/HCC)       Other    Weakness     Medications, treatments, and labs reviewed  Continue medications and  treatments as listed in PCC    Darcie Mcdermott MD

## 2023-03-27 NOTE — LETTER
Patient: Patricia Jewell  : 1935    Encounter Date: 2023    Subjective  Chief complaint: Patricia Jewell is a 87 y.o. female who is a acute skilled care patient being seen and evaluated for weakness    HPI:  3/22/23 Patient admitted to SNF for therapy after recent hospitalization for right ankle fx s/p ORIF.  Patient denies pain.  She is working in PT OT and ST.  Skilled interventions focused on safe transfers.  Patient is able to perform stand pivot transfers with front wheel walker with contact-guard assist.  Speech therapy is working with her on cognitive communication skills.    3/23/23 Patient working in therapy due to weakness. Patient is able to perform stand pivot transfers with front wheel walker with contact-guard assist.  Speech therapy is working with her on cognitive communication skills. No acute distress. Pain controlled.     3/24/2023 patient continues to work towards goals in therapy.  She states that she is feeling okay.  Pain medication is effective when taken.  No new concerns today.    3/27/22 Patient working in therapy and is doing well. She denies constitutional symptoms. Skilled interventions focused on safe transfers.  Patient is able to perform stand pivot transfers with front wheel walker with contact-guard assist. Denies SOB.         Review of Systems  All systems reviewed and negative except for what was mentioned in the HPI    Vital signs: 108/52, 71, 18, 95%    Objective  Physical Exam  Constitutional:       General: She is not in acute distress.  Eyes:      Extraocular Movements: Extraocular movements intact.   Cardiovascular:      Rate and Rhythm: Regular rhythm.   Pulmonary:      Effort: Pulmonary effort is normal.      Breath sounds: Normal breath sounds.   Abdominal:      General: Bowel sounds are normal.      Palpations: Abdomen is soft.   Musculoskeletal:      Cervical back: Neck supple.      Right lower leg: No edema.      Left lower leg: No edema.   Neurological:       Mental Status: She is alert.   Psychiatric:         Mood and Affect: Mood normal.         Behavior: Behavior is cooperative.         Assessment/Plan  Problem List Items Addressed This Visit          Circulatory    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)     Continue diuretic  Monitor weight            Other    Weakness     Continue therapy          Medications, treatments, and labs reviewed  Continue medications and treatments as listed in PCC    Scribe Attestation  By signing my name below, IAndreina, Scribe   attest that this documentation has been prepared under the direction and in the presence of Darcie Mcdermott MD.    Provider Attestation - Scribe documentation  All medical record entries made by the Scribe were at my direction and personally dictated by me. I have reviewed the chart and agree that the record accurately reflects my personal performance of the history, physical exam, discussion and plan.      Electronically Signed By: Darcie Mcdermott MD   3/27/23  6:23 PM

## 2023-03-27 NOTE — PROGRESS NOTES
Subjective   Chief complaint: Patricia Jewell is a 87 y.o. female who is a acute skilled care patient being seen and evaluated for weakness    HPI:  3/22/23 Patient admitted to SNF for therapy after recent hospitalization for right ankle fx s/p ORIF.  Patient denies pain.  She is working in PT OT and ST.  Skilled interventions focused on safe transfers.  Patient is able to perform stand pivot transfers with front wheel walker with contact-guard assist.  Speech therapy is working with her on cognitive communication skills.    3/23/23 Patient working in therapy due to weakness. Patient is able to perform stand pivot transfers with front wheel walker with contact-guard assist.  Speech therapy is working with her on cognitive communication skills. No acute distress. Pain controlled.     3/24/2023 patient continues to work towards goals in therapy.  She states that she is feeling okay.  Pain medication is effective when taken.  No new concerns today.    3/27/22 Patient working in therapy and is doing well. She denies constitutional symptoms. Skilled interventions focused on safe transfers.  Patient is able to perform stand pivot transfers with front wheel walker with contact-guard assist. Denies SOB.         Review of Systems  All systems reviewed and negative except for what was mentioned in the HPI    Vital signs: 108/52, 71, 18, 95%    Objective   Physical Exam  Constitutional:       General: She is not in acute distress.  Eyes:      Extraocular Movements: Extraocular movements intact.   Cardiovascular:      Rate and Rhythm: Regular rhythm.   Pulmonary:      Effort: Pulmonary effort is normal.      Breath sounds: Normal breath sounds.   Abdominal:      General: Bowel sounds are normal.      Palpations: Abdomen is soft.   Musculoskeletal:      Cervical back: Neck supple.      Right lower leg: No edema.      Left lower leg: No edema.   Neurological:      Mental Status: She is alert.   Psychiatric:         Mood and  Affect: Mood normal.         Behavior: Behavior is cooperative.         Assessment/Plan   Problem List Items Addressed This Visit          Circulatory    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)     Continue diuretic  Monitor weight            Other    Weakness     Continue therapy          Medications, treatments, and labs reviewed  Continue medications and treatments as listed in PCC    Scribe Attestation  By signing my name below, I, Georgina Enriquez   attest that this documentation has been prepared under the direction and in the presence of Darcie Mcdermott MD.    Provider Attestation - Scribe documentation  All medical record entries made by the Scribe were at my direction and personally dictated by me. I have reviewed the chart and agree that the record accurately reflects my personal performance of the history, physical exam, discussion and plan.

## 2023-03-28 NOTE — LETTER
Patient: Patricia Jewell  : 1935    Encounter Date: 2023    Subjective  Chief complaint: Patricia Jewell is a 87 y.o. female who is a acute skilled care patient being seen and evaluated for weakness    HPI:  3/22/23 Patient admitted to SNF for therapy after recent hospitalization for right ankle fx s/p ORIF.  Patient denies pain.  She is working in PT OT and ST.  Skilled interventions focused on safe transfers.  Patient is able to perform stand pivot transfers with front wheel walker with contact-guard assist.  Speech therapy is working with her on cognitive communication skills.    3/23/23 Patient working in therapy due to weakness. Patient is able to perform stand pivot transfers with front wheel walker with contact-guard assist.  Speech therapy is working with her on cognitive communication skills. No acute distress. Pain controlled.     3/24/2023 patient continues to work towards goals in therapy.  She states that she is feeling okay.  Pain medication is effective when taken.  No new concerns today.    3/27/22 Patient working in therapy and is doing well. She denies constitutional symptoms. Skilled interventions focused on safe transfers.  Patient is able to perform stand pivot transfers with front wheel walker with contact-guard assist. Denies SOB.     3/28/22 Patient has no new issues today. She is working on safe transfers with therapy and is able to transfer with contact guard assistance. No new concerns today.         Review of Systems  All systems reviewed and negative except for what was mentioned in the HPI    Vital signs: 129/70, 84, 18, 94%    Objective  Physical Exam  Constitutional:       General: She is not in acute distress.  Eyes:      Extraocular Movements: Extraocular movements intact.   Cardiovascular:      Rate and Rhythm: Regular rhythm.   Pulmonary:      Effort: Pulmonary effort is normal.      Breath sounds: Normal breath sounds.   Abdominal:      General: Bowel sounds are  normal.      Palpations: Abdomen is soft.   Musculoskeletal:      Cervical back: Neck supple.      Right lower leg: No edema.      Left lower leg: No edema.   Neurological:      Mental Status: She is alert.   Psychiatric:         Mood and Affect: Mood normal.         Behavior: Behavior is cooperative.         Assessment/Plan  Problem List Items Addressed This Visit          Other    Weakness     Continue PT OT           Medications, treatments, and labs reviewed  Continue medications and treatments as listed in PCC    Scribe Attestation  By signing my name below, IAndreina, Scribe   attest that this documentation has been prepared under the direction and in the presence of Darcie Mcdermott MD.    Provider Attestation - Scribe documentation  All medical record entries made by the Scribe were at my direction and personally dictated by me. I have reviewed the chart and agree that the record accurately reflects my personal performance of the history, physical exam, discussion and plan.      Electronically Signed By: Darcie Mcdermott MD   3/30/23 11:44 AM

## 2023-03-29 NOTE — PROGRESS NOTES
PROGRESS NOTE  Subjective   Chief complaint: Patricia Jewell is a 87 y.o. female who is an acute skilled patient being seen and evaluated for weakness    HPI:  3/22/23 Patient admitted to SNF for therapy after recent hospitalization for right ankle fx s/p ORIF.  Patient denies pain.  She is working in PT OT and ST.  Skilled interventions focused on safe transfers.  Patient is able to perform stand pivot transfers with front wheel walker with contact-guard assist.  Speech therapy is working with her on cognitive communication skills.    3/23/23 Patient working in therapy due to weakness. Patient is able to perform stand pivot transfers with front wheel walker with contact-guard assist.  Speech therapy is working with her on cognitive communication skills. No acute distress. Pain controlled.     3/24/2023 patient continues to work towards goals in therapy.  She states that she is feeling okay.  Pain medication is effective when taken.  No new concerns today.    3/27/23 Patient working in therapy and is doing well. She denies constitutional symptoms. Skilled interventions focused on safe transfers.  Patient is able to perform stand pivot transfers with front wheel walker with contact-guard assist. Denies SOB.     3/29/2023 patient has been working in PT and OT due to generalized weakness.  She is doing active range of motion within weightbearing precautions and is working on balance activities while standing.  Patient has no new concerns today.  Denies constitutional symptoms and pain.      Objective   Vital signs: 129/70, 18, 98.2, 84, 94%    Physical Exam  Constitutional:       General: She is not in acute distress.  Eyes:      Extraocular Movements: Extraocular movements intact.   Cardiovascular:      Rate and Rhythm: Regular rhythm.   Pulmonary:      Effort: Pulmonary effort is normal.      Breath sounds: Normal breath sounds.   Abdominal:      General: Bowel sounds are normal.      Palpations: Abdomen is soft.    Musculoskeletal:      Cervical back: Neck supple.      Right lower leg: No edema.      Left lower leg: No edema.      Comments: RLE soft splint  Generalized weakness   Neurological:      Mental Status: She is alert.   Psychiatric:         Mood and Affect: Mood normal.         Behavior: Behavior is cooperative.         Assessment/Plan   Problem List Items Addressed This Visit       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)     Stable  Continue diuretic  Monitor weight         Closed fracture of right lower leg with routine healing     Pain meds  Therapy  Follow-up with Ortho         Hypertension, essential     Controlled  Continue antihypertensives  Continue to monitor blood pressure         Paroxysmal atrial fibrillation (CMS/HCC)     Heart rate controlled  Anticoagulant  Monitor for bleeding           Weakness - Primary     Continue therapy          Medications, treatments, and labs reviewed  Continue medications and treatments as listed in PCC    REBECA Wilson-CNP

## 2023-03-29 NOTE — LETTER
Patient: Patricia Jewell  : 1935    Encounter Date: 2023    PROGRESS NOTE  Subjective  Chief complaint: Patricia Jewell is a 87 y.o. female who is an acute skilled patient being seen and evaluated for weakness    HPI:  3/22/23 Patient admitted to SNF for therapy after recent hospitalization for right ankle fx s/p ORIF.  Patient denies pain.  She is working in PT OT and ST.  Skilled interventions focused on safe transfers.  Patient is able to perform stand pivot transfers with front wheel walker with contact-guard assist.  Speech therapy is working with her on cognitive communication skills.    3/23/23 Patient working in therapy due to weakness. Patient is able to perform stand pivot transfers with front wheel walker with contact-guard assist.  Speech therapy is working with her on cognitive communication skills. No acute distress. Pain controlled.     3/24/2023 patient continues to work towards goals in therapy.  She states that she is feeling okay.  Pain medication is effective when taken.  No new concerns today.    3/27/23 Patient working in therapy and is doing well. She denies constitutional symptoms. Skilled interventions focused on safe transfers.  Patient is able to perform stand pivot transfers with front wheel walker with contact-guard assist. Denies SOB.     3/29/2023 patient has been working in PT and OT due to generalized weakness.  She is doing active range of motion within weightbearing precautions and is working on balance activities while standing.  Patient has no new concerns today.  Denies constitutional symptoms and pain.      Objective  Vital signs: 129/70, 18, 98.2, 84, 94%    Physical Exam  Constitutional:       General: She is not in acute distress.  Eyes:      Extraocular Movements: Extraocular movements intact.   Cardiovascular:      Rate and Rhythm: Regular rhythm.   Pulmonary:      Effort: Pulmonary effort is normal.      Breath sounds: Normal breath sounds.   Abdominal:       General: Bowel sounds are normal.      Palpations: Abdomen is soft.   Musculoskeletal:      Cervical back: Neck supple.      Right lower leg: No edema.      Left lower leg: No edema.      Comments: RLE soft splint  Generalized weakness   Neurological:      Mental Status: She is alert.   Psychiatric:         Mood and Affect: Mood normal.         Behavior: Behavior is cooperative.         Assessment/Plan  Problem List Items Addressed This Visit       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)     Stable  Continue diuretic  Monitor weight         Closed fracture of right lower leg with routine healing     Pain meds  Therapy  Follow-up with Ortho         Hypertension, essential     Controlled  Continue antihypertensives  Continue to monitor blood pressure         Paroxysmal atrial fibrillation (CMS/HCC)     Heart rate controlled  Anticoagulant  Monitor for bleeding           Weakness - Primary     Continue therapy          Medications, treatments, and labs reviewed  Continue medications and treatments as listed in PCC    TEZ Wilson      Electronically Signed By: TEZ Wilson   3/30/23  4:25 PM

## 2023-03-29 NOTE — PROGRESS NOTES
Subjective   Chief complaint: Patricia Jewell is a 87 y.o. female who is a acute skilled care  presents for weakness.  HPI:  Patient presents for weakness.  Patient examined today at bedside.  Patient continues working in therapy.  Patient denies any pain at this comfort or discomfort.  Patient has no new concerns.  Therapy reports that patient is able to ambulate with front wheel walker contact-guard assist.  Patient able to perform sit to stands and front wheel walker with contact-guard assist.  Staff reports no new issues.  No acute distress.        Review of Systems  All systems reviewed and negative except for what was mentioned in the HPI    Vital signs: 139/80, 97.4, 63, 18    Objective   Physical Exam  Constitutional:       General: She is not in acute distress.  Eyes:      Extraocular Movements: Extraocular movements intact.   Cardiovascular:      Rate and Rhythm: Regular rhythm.   Pulmonary:      Effort: Pulmonary effort is normal.      Breath sounds: Normal breath sounds.   Abdominal:      General: Bowel sounds are normal.      Palpations: Abdomen is soft.   Musculoskeletal:      Cervical back: Neck supple.      Right lower leg: No edema.      Left lower leg: No edema.   Neurological:      Mental Status: She is alert.      Motor: Weakness present.   Psychiatric:         Mood and Affect: Mood normal.         Behavior: Behavior is cooperative.         Assessment/Plan   Problem List Items Addressed This Visit       Closed fracture of right lower leg with routine healing     pain control.  Continue PT OT         Hypertension, essential     Continue to monitor blood pressure.  Continue antihypertensive meds.         Paroxysmal atrial fibrillation (CMS/HCC)     A-fib controlled.  Continue apixaban         Weakness - Primary     Positive for weakness.  Continue PT OT          Medications, treatments, and labs reviewed  Continue medications and treatments as listed in PCC    Scribe Attestation  I, christiano allen ,  Scribe   attest that this documentation has been prepared under the direction and in the presence of Darcie Mcdermott MD.    Provider Attestation - Scribe documentation  All medical record entries made by the Scribe were at my direction and personally dictated by me. I have reviewed the chart and agree that the record accurately reflects my personal performance of the history, physical exam, discussion and plan.

## 2023-03-29 NOTE — PROGRESS NOTES
Subjective   Chief complaint: Patricia Jewell is a 87 y.o. female who is a acute skilled care patient being seen and evaluated for weakness    HPI:  3/22/23 Patient admitted to SNF for therapy after recent hospitalization for right ankle fx s/p ORIF.  Patient denies pain.  She is working in PT OT and ST.  Skilled interventions focused on safe transfers.  Patient is able to perform stand pivot transfers with front wheel walker with contact-guard assist.  Speech therapy is working with her on cognitive communication skills.    3/23/23 Patient working in therapy due to weakness. Patient is able to perform stand pivot transfers with front wheel walker with contact-guard assist.  Speech therapy is working with her on cognitive communication skills. No acute distress. Pain controlled.     3/24/2023 patient continues to work towards goals in therapy.  She states that she is feeling okay.  Pain medication is effective when taken.  No new concerns today.    3/27/22 Patient working in therapy and is doing well. She denies constitutional symptoms. Skilled interventions focused on safe transfers.  Patient is able to perform stand pivot transfers with front wheel walker with contact-guard assist. Denies SOB.     3/28/22 Patient has no new issues today. She is working on safe transfers with therapy and is able to transfer with contact guard assistance. No new concerns today.         Review of Systems  All systems reviewed and negative except for what was mentioned in the HPI    Vital signs: 129/70, 84, 18, 94%    Objective   Physical Exam  Constitutional:       General: She is not in acute distress.  Eyes:      Extraocular Movements: Extraocular movements intact.   Cardiovascular:      Rate and Rhythm: Regular rhythm.   Pulmonary:      Effort: Pulmonary effort is normal.      Breath sounds: Normal breath sounds.   Abdominal:      General: Bowel sounds are normal.      Palpations: Abdomen is soft.   Musculoskeletal:      Cervical  back: Neck supple.      Right lower leg: No edema.      Left lower leg: No edema.   Neurological:      Mental Status: She is alert.   Psychiatric:         Mood and Affect: Mood normal.         Behavior: Behavior is cooperative.         Assessment/Plan   Problem List Items Addressed This Visit          Musculoskeletal    Closed fracture of right lower leg with routine healing - Primary     Patient required wheelchair due to limited mobility  Status post right ankle fracture wheelchair will assist the patient in completing daily ADL bathing, feeding, grooming.  Wheelchair will improve their mobility and they will use them in the home.  She unable to use a cane or walker to ambulate  Patient discharged home with a wheelchair to improve mobility.             Other    Weakness     Continue PT OT           Medications, treatments, and labs reviewed  Continue medications and treatments as listed in PCC    Scribe Attestation  By signing my name below, I, Georgina Enriquez   attest that this documentation has been prepared under the direction and in the presence of Darcie Mcdermott MD.    Provider Attestation - Scribe documentation  All medical record entries made by the Scribe were at my direction and personally dictated by me. I have reviewed the chart and agree that the record accurately reflects my personal performance of the history, physical exam, discussion and plan.

## 2023-03-30 NOTE — LETTER
Patient: Patricia Jewell  : 1935    Encounter Date: 2023    Subjective  Chief complaint: Patricia Jewell is a 87 y.o. female who is a acute skilled care patient being seen and evaluated for weakness    HPI:  3/22/23 Patient admitted to SNF for therapy after recent hospitalization for right ankle fx s/p ORIF.  Patient denies pain.  She is working in PT OT and ST.  Skilled interventions focused on safe transfers.  Patient is able to perform stand pivot transfers with front wheel walker with contact-guard assist.  Speech therapy is working with her on cognitive communication skills.    3/23/23 Patient working in therapy due to weakness. Patient is able to perform stand pivot transfers with front wheel walker with contact-guard assist.  Speech therapy is working with her on cognitive communication skills. No acute distress. Pain controlled.     3/24/2023 patient continues to work towards goals in therapy.  She states that she is feeling okay.  Pain medication is effective when taken.  No new concerns today.    3/27/23 Patient working in therapy and is doing well. She denies constitutional symptoms. Skilled interventions focused on safe transfers.  Patient is able to perform stand pivot transfers with front wheel walker with contact-guard assist. Denies SOB.     3/29/2023 patient has been working in PT and OT due to generalized weakness.  She is doing active range of motion within weightbearing precautions and is working on balance activities while standing.  Patient has no new concerns today.  Denies constitutional symptoms and pain.    3/30/23 Patient with recent right ankle fracture s/p ORIF working in therapy. She denies pain at this time. She requires minimal assistance for transfers and working with speech therapy for cognition. No new issues today. No acute distress. Denies SOB        Review of Systems  All systems reviewed and negative except for what was mentioned in the HPI    Vital signs: 105/57,  78, 18, 97%    Objective  Physical Exam  Constitutional:       General: She is not in acute distress.  Eyes:      Extraocular Movements: Extraocular movements intact.   Cardiovascular:      Rate and Rhythm: Regular rhythm.   Pulmonary:      Effort: Pulmonary effort is normal.      Breath sounds: Normal breath sounds.   Abdominal:      General: Bowel sounds are normal.      Palpations: Abdomen is soft.   Musculoskeletal:      Cervical back: Neck supple.      Right lower leg: No edema.      Left lower leg: No edema.   Neurological:      Mental Status: She is alert.   Psychiatric:         Mood and Affect: Mood normal.         Behavior: Behavior is cooperative.         Assessment/Plan  Problem List Items Addressed This Visit          Circulatory    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)     Stable  Continue diuretic  Monitor weight            Musculoskeletal    Closed fracture of right lower leg with routine healing     Pain meds  Therapy            Other    Weakness     Continue therapy          Medications, treatments, and labs reviewed  Continue medications and treatments as listed in PCC    Scribe Attestation  By signing my name below, IAndreina Scribe   attest that this documentation has been prepared under the direction and in the presence of Darcie Mcdermott MD.    Provider Attestation - Scribe documentation  All medical record entries made by the Scribe were at my direction and personally dictated by me. I have reviewed the chart and agree that the record accurately reflects my personal performance of the history, physical exam, discussion and plan.      Electronically Signed By: Darcie Mcdermott MD   3/31/23  6:52 PM

## 2023-03-31 NOTE — LETTER
Patient: Patricia Jewell  : 1935    Encounter Date: 2023    PROGRESS NOTE    Subjective  Chief complaint: Patricia Jewell is a 87 y.o. female who is an acute skilled patient being seen and evaluated for weakness    HPI:  3/22/23 Patient admitted to SNF for therapy after recent hospitalization for right ankle fx s/p ORIF.  Patient denies pain.  She is working in PT OT and ST.  Skilled interventions focused on safe transfers.  Patient is able to perform stand pivot transfers with front wheel walker with contact-guard assist.  Speech therapy is working with her on cognitive communication skills.    3/23/23 Patient working in therapy due to weakness. Patient is able to perform stand pivot transfers with front wheel walker with contact-guard assist.  Speech therapy is working with her on cognitive communication skills. No acute distress. Pain controlled.     3/24/2023 patient continues to work towards goals in therapy.  She states that she is feeling okay.  Pain medication is effective when taken.  No new concerns today.    3/27/23 Patient working in therapy and is doing well. She denies constitutional symptoms. Skilled interventions focused on safe transfers.  Patient is able to perform stand pivot transfers with front wheel walker with contact-guard assist. Denies SOB.     3/29/2023 patient has been working in PT and OT due to generalized weakness.  She is doing active range of motion within weightbearing precautions and is working on balance activities while standing.  Patient has no new concerns today.  Denies constitutional symptoms and pain.    3/30/23 Patient with recent right ankle fracture s/p ORIF working in therapy. She denies pain at this time. She requires minimal assistance for transfers and working with speech therapy for cognition. No new issues today. No acute distress. Denies SOB    3/31/2023 patient states she feels well.  She has no new concerns today.  Denies pain.  Continues to work  toward goals in therapy.      Objective  Vital signs: 105/57, 18, 98.6, 78, 97%    Physical Exam  Constitutional:       General: She is not in acute distress.  Eyes:      Extraocular Movements: Extraocular movements intact.   Cardiovascular:      Rate and Rhythm: Regular rhythm.   Pulmonary:      Effort: Pulmonary effort is normal.      Breath sounds: Normal breath sounds.   Abdominal:      General: Bowel sounds are normal.      Palpations: Abdomen is soft.   Musculoskeletal:      Cervical back: Neck supple.      Right lower leg: No edema.      Left lower leg: No edema.      Comments: RLE soft splint  Generalized weakness   Neurological:      Mental Status: She is alert.   Psychiatric:         Mood and Affect: Mood normal.         Behavior: Behavior is cooperative.         Assessment/Plan  Problem List Items Addressed This Visit       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)     Stable  Continue diuretic  Monitor weight         Closed fracture of right lower leg with routine healing     Pain meds  Therapy  Follow-up with Ortho         Hypertension, essential     Controlled  Continue antihypertensives  Continue to monitor blood pressure         Paroxysmal atrial fibrillation (CMS/HCC)     Heart rate controlled  Anticoagulant  Monitor for bleeding           Weakness - Primary     Continue therapy          Medications, treatments, and labs reviewed  Continue medications and treatments as listed in [facility]    TEZ Wilson      Electronically Signed By: TEZ Wilson   3/31/23  8:26 PM

## 2023-03-31 NOTE — ASSESSMENT & PLAN NOTE
Patient required wheelchair due to limited mobility  Status post right ankle fracture wheelchair will assist the patient in completing daily ADL bathing, feeding, grooming.  Wheelchair will improve their mobility and they will use them in the home.  She unable to use a cane or walker to ambulate  Patient discharged home with a wheelchair to improve mobility.

## 2023-03-31 NOTE — PROGRESS NOTES
Subjective   Chief complaint: Patricia Jewell is a 87 y.o. female who is a acute skilled care patient being seen and evaluated for weakness    HPI:  3/22/23 Patient admitted to SNF for therapy after recent hospitalization for right ankle fx s/p ORIF.  Patient denies pain.  She is working in PT OT and ST.  Skilled interventions focused on safe transfers.  Patient is able to perform stand pivot transfers with front wheel walker with contact-guard assist.  Speech therapy is working with her on cognitive communication skills.    3/23/23 Patient working in therapy due to weakness. Patient is able to perform stand pivot transfers with front wheel walker with contact-guard assist.  Speech therapy is working with her on cognitive communication skills. No acute distress. Pain controlled.     3/24/2023 patient continues to work towards goals in therapy.  She states that she is feeling okay.  Pain medication is effective when taken.  No new concerns today.    3/27/23 Patient working in therapy and is doing well. She denies constitutional symptoms. Skilled interventions focused on safe transfers.  Patient is able to perform stand pivot transfers with front wheel walker with contact-guard assist. Denies SOB.     3/29/2023 patient has been working in PT and OT due to generalized weakness.  She is doing active range of motion within weightbearing precautions and is working on balance activities while standing.  Patient has no new concerns today.  Denies constitutional symptoms and pain.    3/30/23 Patient with recent right ankle fracture s/p ORIF working in therapy. She denies pain at this time. She requires minimal assistance for transfers and working with speech therapy for cognition. No new issues today. No acute distress. Denies SOB        Review of Systems  All systems reviewed and negative except for what was mentioned in the HPI    Vital signs: 105/57, 78, 18, 97%    Objective   Physical Exam  Constitutional:       General:  She is not in acute distress.  Eyes:      Extraocular Movements: Extraocular movements intact.   Cardiovascular:      Rate and Rhythm: Regular rhythm.   Pulmonary:      Effort: Pulmonary effort is normal.      Breath sounds: Normal breath sounds.   Abdominal:      General: Bowel sounds are normal.      Palpations: Abdomen is soft.   Musculoskeletal:      Cervical back: Neck supple.      Right lower leg: No edema.      Left lower leg: No edema.   Neurological:      Mental Status: She is alert.   Psychiatric:         Mood and Affect: Mood normal.         Behavior: Behavior is cooperative.         Assessment/Plan   Problem List Items Addressed This Visit          Circulatory    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)     Stable  Continue diuretic  Monitor weight            Musculoskeletal    Closed fracture of right lower leg with routine healing     Pain meds  Therapy            Other    Weakness     Continue therapy          Medications, treatments, and labs reviewed  Continue medications and treatments as listed in PCC    Scribe Attestation  By signing my name below, IAndreina Scribe   attest that this documentation has been prepared under the direction and in the presence of Darcie Mcdermott MD.    Provider Attestation - Scribe documentation  All medical record entries made by the Scribe were at my direction and personally dictated by me. I have reviewed the chart and agree that the record accurately reflects my personal performance of the history, physical exam, discussion and plan.

## 2023-04-01 NOTE — PROGRESS NOTES
PROGRESS NOTE    Subjective   Chief complaint: Patricia Jewell is a 87 y.o. female who is an acute skilled patient being seen and evaluated for weakness    HPI:  3/22/23 Patient admitted to SNF for therapy after recent hospitalization for right ankle fx s/p ORIF.  Patient denies pain.  She is working in PT OT and ST.  Skilled interventions focused on safe transfers.  Patient is able to perform stand pivot transfers with front wheel walker with contact-guard assist.  Speech therapy is working with her on cognitive communication skills.    3/23/23 Patient working in therapy due to weakness. Patient is able to perform stand pivot transfers with front wheel walker with contact-guard assist.  Speech therapy is working with her on cognitive communication skills. No acute distress. Pain controlled.     3/24/2023 patient continues to work towards goals in therapy.  She states that she is feeling okay.  Pain medication is effective when taken.  No new concerns today.    3/27/23 Patient working in therapy and is doing well. She denies constitutional symptoms. Skilled interventions focused on safe transfers.  Patient is able to perform stand pivot transfers with front wheel walker with contact-guard assist. Denies SOB.     3/29/2023 patient has been working in PT and OT due to generalized weakness.  She is doing active range of motion within weightbearing precautions and is working on balance activities while standing.  Patient has no new concerns today.  Denies constitutional symptoms and pain.    3/30/23 Patient with recent right ankle fracture s/p ORIF working in therapy. She denies pain at this time. She requires minimal assistance for transfers and working with speech therapy for cognition. No new issues today. No acute distress. Denies SOB    3/31/2023 patient states she feels well.  She has no new concerns today.  Denies pain.  Continues to work toward goals in therapy.      Objective   Vital signs: 105/57, 18, 98.6, 78,  97%    Physical Exam  Constitutional:       General: She is not in acute distress.  Eyes:      Extraocular Movements: Extraocular movements intact.   Cardiovascular:      Rate and Rhythm: Regular rhythm.   Pulmonary:      Effort: Pulmonary effort is normal.      Breath sounds: Normal breath sounds.   Abdominal:      General: Bowel sounds are normal.      Palpations: Abdomen is soft.   Musculoskeletal:      Cervical back: Neck supple.      Right lower leg: No edema.      Left lower leg: No edema.      Comments: RLE soft splint  Generalized weakness   Neurological:      Mental Status: She is alert.   Psychiatric:         Mood and Affect: Mood normal.         Behavior: Behavior is cooperative.         Assessment/Plan   Problem List Items Addressed This Visit       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)     Stable  Continue diuretic  Monitor weight         Closed fracture of right lower leg with routine healing     Pain meds  Therapy  Follow-up with Ortho         Hypertension, essential     Controlled  Continue antihypertensives  Continue to monitor blood pressure         Paroxysmal atrial fibrillation (CMS/HCC)     Heart rate controlled  Anticoagulant  Monitor for bleeding           Weakness - Primary     Continue therapy          Medications, treatments, and labs reviewed  Continue medications and treatments as listed in PCC    Sunita Wilson, APRN-CNP

## 2023-10-09 PROBLEM — I42.0 CARDIOMYOPATHY, DILATED, NONISCHEMIC (MULTI): Status: ACTIVE | Noted: 2023-01-01

## 2023-10-09 PROBLEM — D64.9 ABSOLUTE ANEMIA: Status: ACTIVE | Noted: 2023-01-01

## 2023-10-09 NOTE — ED PROVIDER NOTES
HPI   Chief Complaint   Patient presents with    left foot swelling       Patient is an 87-year-old female with past medical history of paroxysmal atrial fibrillation on Eliquis, present always take this, who was sent to the ED today from urgent care due to left foot swelling and redness.  They are concerned she might have a DVT given her intermittent compliance with her blood thinner and the new swelling and redness to her foot.  Patient does not recall trauma to the area, but  thinks she may have hit it on something in her garden.  Patient denies distal numbness or weakness.  Denies history of DVT or PE, denies any shortness of breath or chest pain.      History provided by:  Relative (Daughter)  History limited by:  Age   used: No                        Dagoberto Coma Scale Score: 14                  Patient History   Past Medical History:   Diagnosis Date    Fracture of left shoulder girdle, part unspecified, subsequent encounter for fracture with delayed healing     Closed fracture of left shoulder with delayed healing, subsequent encounter    History of falling     History of fall    Personal history of non-Hodgkin lymphomas     History of non-Hodgkin's lymphoma    Personal history of other venous thrombosis and embolism     History of DVT (deep vein thrombosis)    Personal history of pulmonary embolism     History of pulmonary embolism     Past Surgical History:   Procedure Laterality Date    CATARACT EXTRACTION  10/10/2018    Cataract Surgery    HERNIA REPAIR  10/05/2016    Inguinal Hernia Repair    HYSTERECTOMY  09/14/2016    Hysterectomy    OTHER SURGICAL HISTORY  09/14/2016    Thoracentesis (Therapeutic)    OTHER SURGICAL HISTORY  09/14/2016    Interruption Inferior Vena Cava Bhupendra Filter Placement    XR CHEST PACEMAKER WITH FLUORO  3/16/2023    XR CHEST PACEMAKER WITH FLUORO 3/16/2023 [facility]     No family history on file.  Social History     Tobacco Use    Smoking  status: Not on file    Smokeless tobacco: Not on file   Substance Use Topics    Alcohol use: Not on file    Drug use: Not on file       Physical Exam   ED Triage Vitals [10/09/23 1648]   Temp Heart Rate Resp BP   36.8 °C (98.3 °F) 73 18 150/78      SpO2 Temp Source Heart Rate Source Patient Position   94 % Temporal Monitor --      BP Location FiO2 (%)     Left arm --       Physical Exam  Vitals and nursing note reviewed.   Constitutional:       General: She is not in acute distress.     Appearance: She is well-developed.   HENT:      Head: Normocephalic and atraumatic.   Eyes:      Conjunctiva/sclera: Conjunctivae normal.   Cardiovascular:      Rate and Rhythm: Normal rate and regular rhythm.      Pulses:           Dorsalis pedis pulses are 2+ on the right side and 2+ on the left side.        Posterior tibial pulses are 2+ on the right side and 2+ on the left side.      Heart sounds: No murmur heard.     Comments: Left foot with slight erythema and abrasion over the dorsal aspect.  Pulmonary:      Effort: Pulmonary effort is normal. No respiratory distress.      Breath sounds: Normal breath sounds.   Abdominal:      Palpations: Abdomen is soft.      Tenderness: There is no abdominal tenderness.   Musculoskeletal:         General: No swelling.      Cervical back: Neck supple.      Right lower le+ Pitting Edema present.      Left lower leg: 3+ Pitting Edema present.   Skin:     General: Skin is warm and dry.      Capillary Refill: Capillary refill takes less than 2 seconds.   Neurological:      Mental Status: She is alert.   Psychiatric:         Mood and Affect: Mood normal.         ED Course & MDM   ED Course as of 10/09/23 2049   Mon Oct 09, 2023   1944 Basic metabolic panel(!!)  BMP does show hypokalemia, remaining electrolytes are normal, no kidney injury.  Patient's potassium will be repleted and EKG was ordered.  I did discuss this result with the patient and she does state that she has been having some  issues with diarrhea recently which may explain the potassium. [WS]   1944 CBC and Auto Differential(!)  CBC shows no leukocytosis, no significant anemia or thrombocytopenia. [WS]   1944 XR foot left 3+ views  No acute osseous injury [WS]   1945 Lower extremity venous duplex left  I was called by ultrasound tech and the patient did not want the ultrasound due to requiring to have her pants removed for the imaging.  I did discuss this with the patient and convince her that she needed the ultrasound order to determine whether or not she has a DVT of the lower extremity. [WS]   1948 ECG 12 lead  EKG, my read, 1943  Sinus/atrial tachycardia, PVCs, right bundle branch block, no acute ST elevation or depression.  Ventricular rate-106 BPM [WS]   2045 Lower extremity venous duplex left  Negative study.  No deep venous thrombosis of the  left lowerextremity.   [WS]      ED Course User Index  [WS] Charles Arevalo, APRN-CNP         Diagnoses as of 10/09/23 2049   Cellulitis of left lower extremity   Hypokalemia   Diarrhea, unspecified type       Medical Decision Making  Differential diagnosis: Cellulitis, DVT, fracture, PAD, abrasion.    Patient's lower extremity redness appears to be more likely related to the abrasion and cellulitis.  There is no DVT on ultrasound.  No fracture on x-ray.  Peripheral artery disease was considered but patient does have +2 pulses in the DP and PT region as well as brisk capillary refill.  Claudication is possible but not appreciated at this time as patient is not having any pain in the extremity.  Patient was noted to have hypokalemia, when discussing with patient she has had some recent diarrhea.  Patient does not have any belly pain at this time.  Patient was given oral potassium in the ED and will be given potassium for home.  Patient also be treated with antibiotic, Keflex.  She will be given a prescription for Keflex for home.  Return ED precautions were discussed with patient.    I  discussed the differential, results and discharge plan with the patient.  I emphasized the importance of follow-up with the physician I referred them to in the timeframe recommended.  I explained reasons for the them to return to the Emergency Department. Additional verbal discharge instructions were also given and discussed with them to supplement those generated by the EMR. We also discussed medications that were prescribed (if any) including common side effects and interactions. All questions were addressed.  They understand return precautions and discharge instructions. They expressed understanding.          Problems Addressed:  Cellulitis of left lower extremity: acute illness or injury  Diarrhea, unspecified type: acute illness or injury  Hypokalemia: acute illness or injury    Amount and/or Complexity of Data Reviewed  Labs: ordered. Decision-making details documented in ED Course.  Radiology: ordered and independent interpretation performed. Decision-making details documented in ED Course.        Procedure  Procedures     TEZ Jovel  10/09/23 1759       TEZ Jovel  10/09/23 1946       TEZ Jovel  10/09/23 2050

## 2023-10-13 NOTE — PATIENT INSTRUCTIONS
Thank you for coming in today.  If you have any questions you may contact the office Monday through Friday at [phone] or on week ends at [phone].    Please increase the spironolactone to 25 mg 1 tablet every day.   Please increase the Furosemide to 20 mg 2 tablets (40 mg) for the next 5 days then resume 20 mg daily.   Please have lab work completed on Wednesday next week. Please follow  a 2 GM sodium diet and limit fluid intake to 2 liters per day or 8 servings ( serving size = 8 oz. = 1 cup = 240 ml) per day.     Please avoid processed meat products (luncheon meats, sausages, pierre, hot dogs for example) eat 4 servings of vegetables and 1-2 whole servings of whole fruits per day.   Please weigh daily and call [phone] for weight gain of 3 pounds in 24 hours or 5 pounds or if you experience increased swelling or shortness of breath.    Follow up in 7-10 days.

## 2023-10-13 NOTE — PROGRESS NOTES
"Subjective   Patient ID: Patricia Jewell is a 88 y.o. female who presents for follow-up of congestive heart failure. Current symptoms include: {symptoms; cardiac:83967}. She denies {symptoms; cardiac:84974}. She states she is {compliance:5303::\"compliant most of the time\"} with her medications. She states she is {compliance:5303::\"compliant most of the time\"} with her diet.    HPI      Review of Systems    Objective     BP: ()/()   Arterial Line BP 1: ()/()     Echocardiogram: {findings; ecg/echo rm:42383}    @LASTLAB    Physical Exam    Assessment/Plan   Problem List Items Addressed This Visit          Cardiac and Vasculature    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) - Primary    Paroxysmal atrial fibrillation (CMS/HCC)    Hypertension, essential    Cardiomyopathy, dilated, nonischemic (CMS/HCC)     1.  Etiology:   AHA Stage:  NYHA class:  Volume Status:   GFR:     GDMT:  BB-  ARB/ACEI/ARNI -   MRA -   SGLT2i -   Diuretic -   Device Therapy:   CHF: {degree of control:5147::\"well controlled\",\"no significant medication side effects noted\"}.  {CHF plans:31728::\"Disease process and medications discussed. Questions answered fully.\",\"Emphasized salt restriction.\",\"Encouraged daily monitoring of the patient's weight.\",\"Encouraged regular exercise.\"}             Romelia Pickard, APRN-CNP                 "

## 2023-10-19 PROBLEM — I48.3 TYPICAL ATRIAL FLUTTER (MULTI): Status: ACTIVE | Noted: 2023-01-01

## 2023-10-19 PROBLEM — R00.0 TACHYCARDIA: Status: ACTIVE | Noted: 2023-01-01

## 2023-10-19 PROBLEM — I25.10 CAD (CORONARY ARTERY DISEASE): Status: ACTIVE | Noted: 2023-01-01

## 2023-10-19 PROBLEM — I07.1 TRICUSPID VALVE REGURGITATION: Status: ACTIVE | Noted: 2023-01-01

## 2023-10-20 NOTE — PROGRESS NOTES
Subjective   Patient ID: Patricia Jweell is a 88 y.o. female who presents for follow-up of congestive heart failure.     Current Outpatient Medications:     acetaminophen (Tylenol) 500 mg tablet, Take 2 tablets (1,000 mg) by mouth every 6 hours if needed for mild pain (1 - 3)., Disp: , Rfl:     amiodarone (Pacerone) 200 mg tablet, Take 1 tablet (200 mg) by mouth once daily., Disp: , Rfl:     atorvastatin (Lipitor) 40 mg tablet, Take 1 tablet (40 mg) by mouth once daily., Disp: , Rfl:     carvedilol (Coreg) 12.5 mg tablet, Take 0.5 tablets (6.25 mg) by mouth 2 times a day., Disp: , Rfl:     Eliquis 2.5 mg tablet, Take 1 tablet (2.5 mg) by mouth 2 times a day., Disp: , Rfl:     Entresto 49-51 mg tablet, Take 1 tablet by mouth 2 times a day., Disp: , Rfl:     Farxiga 10 mg, Take 1 tablet (10 mg) by mouth once daily in the morning. Take before meals., Disp: , Rfl:     furosemide (Lasix) 40 mg tablet, Take 1 tablet (40 mg) by mouth once daily., Disp: , Rfl:     omeprazole OTC (PriLOSEC OTC) 20 mg EC tablet, Take 1 tablet (20 mg) by mouth once daily in the morning. Take before meals. Do not crush, chew, or split., Disp: , Rfl:     potassium chloride CR (Klor-Con M20) 20 mEq ER tablet, Take 1 tablet (20 mEq) by mouth 2 times a day for 7 days. Do not crush or chew., Disp: 14 tablet, Rfl: 0    spironolactone (Aldactone) 25 mg tablet, Take 1 tablet (25 mg) by mouth once daily., Disp: 90 tablet, Rfl: 1     HPI   Current symptoms include: dyspnea, lower extremity edema, orthopnea, and paroxysmal nocturnal dyspnea. She denies chest pressure/discomfort, near-syncope, orthopnea, palpitations, and syncope. She states she is noncompliant some of the time with her medications. She states she is noncompliant some of the time with her diet.  Weight is up from last week and she continues to have significant edema of the lower extremities.  Daughter notices that sometimes she is finding pills laying around the house that she has not  taken.     Review of Systems   Constitutional:  Negative for activity change, chills and fever.   HENT:  Negative for hearing loss.    Eyes: Negative.    Respiratory:  Positive for cough and shortness of breath. Negative for chest tightness and wheezing.    Cardiovascular:  Positive for leg swelling. Negative for chest pain and palpitations.   Gastrointestinal:  Negative for abdominal distention and blood in stool.   Genitourinary:  Negative for hematuria.   Neurological:  Negative for syncope, weakness and light-headedness.   Psychiatric/Behavioral:  Negative for confusion.        Objective     /59 (BP Location: Right arm, Patient Position: Sitting, BP Cuff Size: Adult)   Pulse 92   Resp 18   Wt 59.2 kg (130 lb 8 oz)   SpO2 92%   BMI 22.40 kg/m²     No echocardiogram results found for the past 12 months     Lab Results   Component Value Date    BUN 27 (H) 10/18/2023    CREATININE 1.23 (H) 10/18/2023     (H) 05/16/2023    MG 1.92 10/18/2023    K 4.4 10/18/2023     10/18/2023       Physical Exam  Constitutional:       Appearance: She is not toxic-appearing.   HENT:      Head: Normocephalic.      Nose: Nose normal.   Eyes:      Conjunctiva/sclera: Conjunctivae normal.   Neck:      Comments: Postiive JVD/HJR  Cardiovascular:      Rate and Rhythm: Normal rate and regular rhythm.      Pulses: Normal pulses.      Heart sounds: No murmur heard.  Pulmonary:      Effort: No respiratory distress.      Breath sounds: No wheezing, rhonchi or rales.      Comments: Diminished posterior bases bilaterally.   Abdominal:      General: Bowel sounds are normal.      Palpations: Abdomen is soft.   Musculoskeletal:         General: Swelling present.      Comments: +3 swelling in lower legs and feet.    Skin:     General: Skin is warm and dry.   Neurological:      General: No focal deficit present.      Mental Status: She is alert and oriented to person, place, and time.      Comments: Is a little confused  concerning medications /daughter is helping with medications.    Psychiatric:         Mood and Affect: Mood normal.         Assessment/Plan     Problem List Items Addressed This Visit          Cardiac and Vasculature    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)    Paroxysmal atrial fibrillation (CMS/HCC)        Chronic systolic /diastolic heart failure:   Etiology: non ischemic   AHA Stage: C   NYHA class: 3   Volume Status: volume overloaded.   GFR: 43    GDMT:  BB- carvedilol 12.5 gm 1 tablet twice a day   ARB/ACEI/ARNI - Entresto 49/51 mg 1 tablet twice a day.   MRA -  spironolactone 25 mg 1 tablet daily   SGLT2i -  Farxiga 10 mg 1 tablet daily   Diuretic -  furosemide 40 mg daily -- increasing to 40 mg twice a day over the weekend.   Device Therapy: no pacer     CHF: no significant medication side effects noted, patient poorly compliant, and poorly controlled. IV Lasix 40 mg today in clinic.  Patient will take the furosemide 40 mg in the morning and 20 mg in the afternoon over the weekend.  Follow up on Monday.     Emphasized salt restriction.  Encouraged daily monitoring of the patient's weight.  Labs ordered today.  Follow up in 2 days.    2. Atrial fibrillation : Amiodarone.  RRR on exam today. OAC with Eliqis.  No obvious bleeding.               REBECA Zurita-CNP

## 2023-10-20 NOTE — PATIENT INSTRUCTIONS
Thank you for coming in today.  If you have any questions you may contact the office Monday through Friday at [phone] or on week ends at [phone].     You have received IV Lasix today.     Please take Lasix 40 mg 1 tablet in the morning and 1 tablet in the afternoon for the next two days.     Please follow  a 2 GM sodium diet and limit fluid intake to 2 liters per day or 8 servings ( serving size = 8 oz. = 1 cup = 240 ml) per day.   Please avoid processed meat products (luncheon meats, sausages, pierre, hot dogs for example) eat 4 servings of vegetables and 1-2 whole servings of whole fruits per day.   Please weigh daily and call [phone] for weight gain of 3 pounds in 24 hours or 5 pounds or if you experience increased swelling or shortness of breath.    Follow up on Monday.

## 2023-10-24 PROBLEM — L03.90 CELLULITIS: Status: ACTIVE | Noted: 2023-01-01

## 2023-10-24 NOTE — PROGRESS NOTES
Subjective   Patient ID: Patricia Jewell is a 88 y.o. female who presents for follow-up of congestive heart failure.     Current Outpatient Medications:     acetaminophen (Tylenol) 500 mg tablet, Take 2 tablets (1,000 mg) by mouth every 6 hours if needed for mild pain (1 - 3)., Disp: , Rfl:     amiodarone (Pacerone) 200 mg tablet, Take 1 tablet (200 mg) by mouth once daily., Disp: , Rfl:     atorvastatin (Lipitor) 40 mg tablet, Take 1 tablet (40 mg) by mouth once daily., Disp: , Rfl:     carvedilol (Coreg) 12.5 mg tablet, Take 0.5 tablets (6.25 mg) by mouth 2 times a day., Disp: , Rfl:     Eliquis 2.5 mg tablet, Take 1 tablet (2.5 mg) by mouth 2 times a day., Disp: 180 tablet, Rfl: 1    Farxiga 10 mg, Take 1 tablet (10 mg) by mouth once daily in the morning. Take before meals., Disp: , Rfl:     furosemide (Lasix) 40 mg tablet, Take 1 tablet (40 mg) by mouth once daily., Disp: , Rfl:     omeprazole OTC (PriLOSEC OTC) 20 mg EC tablet, Take 1 tablet (20 mg) by mouth once daily in the morning. Take before meals. Do not crush, chew, or split., Disp: , Rfl:     sacubitriL-valsartan (Entresto) 49-51 mg tablet, Take 1 tablet by mouth 2 times a day., Disp: 180 tablet, Rfl: 3    spironolactone (Aldactone) 25 mg tablet, Take 1 tablet (25 mg) by mouth once daily., Disp: 90 tablet, Rfl: 1     HPI   Current symptoms include: lower extremity edema and orthopnea. She denies chest pressure/discomfort, dyspnea, near-syncope, palpitations, and syncope. She states she is compliant most of the time with her medications. She states she is compliant most of the time with her diet.  She has lost 4# but still has significant edema of the bilateral lower legs.  Lungs are now clear.  She has erythema of the left foot consistent with cellulitis.      Review of Systems   Constitutional:  Negative for activity change, chills and fever.   HENT:  Negative for hearing loss.    Eyes: Negative.    Respiratory:  Positive for shortness of breath.  Negative for cough, chest tightness and wheezing.    Cardiovascular:  Negative for chest pain, palpitations and leg swelling.   Gastrointestinal:  Negative for abdominal distention and blood in stool.   Genitourinary:  Negative for hematuria.   Neurological:  Negative for syncope, weakness and light-headedness.   Psychiatric/Behavioral:  Negative for confusion.        Objective     /68 (BP Location: Right arm, Patient Position: Sitting)   Pulse 58   Resp 20   Wt 57.2 kg (126 lb)   SpO2 92%   BMI 21.63 kg/m²     11/2022 Echocardiogram CONCLUSIONS:   1. Left ventricular systolic function is low normal with a 50-55% estimated ejection fraction.   2. Spectral Doppler shows an impaired relaxation pattern of left ventricular diastolic filling.   3. Moderate to severe tricuspid regurgitation.   4. Moderate to severely elevated right ventricular systolic pressure.   5. Aortic valve stenosis is not present.   6. Mild to moderate aortic valve regurgitation.       Lab Results   Component Value Date    BUN 27 (H) 10/18/2023    CREATININE 1.23 (H) 10/18/2023     (H) 05/16/2023    MG 1.92 10/18/2023    K 4.4 10/18/2023     10/18/2023       Physical Exam  Constitutional:       Appearance: She is not toxic-appearing.   HENT:      Head: Normocephalic.      Nose: Nose normal.   Eyes:      Conjunctiva/sclera: Conjunctivae normal.   Neck:      Comments: No JVD/HJR  Cardiovascular:      Rate and Rhythm: Normal rate and regular rhythm.      Pulses: Normal pulses.      Heart sounds: No murmur heard.  Pulmonary:      Effort: Pulmonary effort is normal. No respiratory distress.      Breath sounds: Normal breath sounds. No wheezing, rhonchi or rales.   Abdominal:      General: Bowel sounds are normal.      Palpations: Abdomen is soft.   Musculoskeletal:         General: Swelling present.   Skin:     General: Skin is warm and dry.   Neurological:      General: No focal deficit present.      Mental Status: She is alert  and oriented to person, place, and time.   Psychiatric:         Mood and Affect: Mood normal.         Assessment/Plan     Problem List Items Addressed This Visit          Cardiac and Vasculature    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)    Paroxysmal atrial fibrillation (CMS/HCC) - Primary    Hypertension, essential    Cardiomyopathy, dilated, nonischemic (CMS/HCC)        Chronic systolic/diastolic heart failure:   Etiology: non ischemic   AHA Stage:  NYHA class:  Volume Status:   GFR:     GDMT:  BB-  Decrease Carvedilol 12.5 mg  1/2 tablet twice a day.   ARB/ACEI/ARNI -  Entresto 49/51 mg 1 tablet twice a day.   MRA - Spironolactone 25 mg 1 tablet daily.   SGLT2i - Farxiga 10 mg 1 tablet twice a day.   Diuretic -  Furosemide 40 mg twice a day.   Device Therapy:  no pacer.     CHF: no significant medication side effects noted and needs improvement.    Emphasized salt restriction.  Encouraged daily monitoring of the patient's weight.  Echocardiogram scheduled.  Labs ordered today.  Follow up in 1 week.    2. Atrial fibrillation:  RRR on Amiodarone.  OAC with elquis and no bleeding.     3. Left foot Cellulitis :           Romelia Pickard, APRN-CNP

## 2023-10-31 PROBLEM — E46 PROTEIN-CALORIE MALNUTRITION, UNSPECIFIED SEVERITY (MULTI): Status: ACTIVE | Noted: 2023-01-01

## 2023-10-31 NOTE — PATIENT INSTRUCTIONS
Limit fluid to 1500 ml daily. Compression socks, Keep the legs elevated. Follow up with cardiologist.

## 2023-10-31 NOTE — PROGRESS NOTES
Subjective   Patient ID: [id number]   HPI    Patricia Jewell is a 88 y.o. female who presents for NPV SAUL and Edema.  Recently she went to ER due to bilateral leg edema.  She was found to have cellulitis with wound on her left foot.  She took antibiotic for 14 days and now using Neosporin cream daily.  She has a diagnosed case of congestive heart failure taking Entresto carvedilol, furosemide.  She is also diagnosed case of atrial fibrillation taking Coreg and Eliquis 2.5 milligram twice daily, she is taking furosemide 40 mg p.o. daily.  She had status 3 CKD and it is a stable.  She has bilateral leg edema but does not have any shortness of breath.        Objective   Visit Vitals  /60 (BP Location: Left arm, Patient Position: Sitting, BP Cuff Size: Adult)   Pulse 108   Temp 36.1 °C (96.9 °F) (Skin)      Review of Systems  All 12 systems reviewed, no other abnormality except that mentioned in HPI.    Physical Exam   Legs- bilateral leg edema. Swelling of left foot with redness.  R/S-  no basal crepitations.    Assessment/Plan   Problem List Items Addressed This Visit       Paroxysmal atrial fibrillation (CMS/HCC)    Cardiomyopathy, dilated, nonischemic (CMS/HCC)    Cellulitis    Protein-calorie malnutrition, unspecified severity (CMS/HCC) - Primary   All her medication reviewed as well medical condition, she has good support system, her daughter and her grandson take care of her.  Advised to follow with her cardiologist, she will continue her medication taking.    Yulissa Mena MD

## 2023-11-26 PROBLEM — L03.90 CELLULITIS, UNSPECIFIED CELLULITIS SITE: Status: ACTIVE | Noted: 2023-01-01

## 2023-11-27 PROBLEM — L03.116 CELLULITIS OF LEFT LEG: Status: ACTIVE | Noted: 2023-01-01

## 2023-11-27 PROBLEM — T17.908A ASPIRATION INTO AIRWAY: Status: ACTIVE | Noted: 2023-01-01

## 2023-11-27 PROBLEM — Z85.72 HISTORY OF NON-HODGKIN'S LYMPHOMA: Status: ACTIVE | Noted: 2023-01-01

## 2023-11-27 PROBLEM — Z86.718 HISTORY OF DVT OF LOWER EXTREMITY: Status: ACTIVE | Noted: 2023-01-01

## 2023-11-27 NOTE — PROGRESS NOTES
"Occupational Therapy    Evaluation    Patient Name: Patricia Jewell \"Jen"   MRN: [id number]  Today's Date: 11/27/2023  Time Calculation  Start Time: 1515  Stop Time: 1550  Time Calculation (min): 35 min        Assessment:  OT Assessment: pt. would benefit from continued O.T. to improve ADL mobility for toileting and ADLs by family  Prognosis: Fair  End of Session Patient Position: Bed, 3 rail up, Alarm on  Prognosis: Fair  Plan:  Treatment Interventions: ADL retraining, Functional transfer training, Patient/family training  OT Frequency: 3 times per week  OT Discharge Recommendations: Low intensity level of continued care (with HHA to assist family support)  OT - OK to Discharge: Yes  Treatment Interventions: ADL retraining, Functional transfer training, Patient/family training    Subjective   Current Problem:  1. Cellulitis, unspecified cellulitis site  Vascular US PVR without exercise    Vascular US PVR without exercise      2. Other specified symptoms and signs involving the circulatory and respiratory systems  Vascular US PVR without exercise    Vascular US PVR without exercise      3. Oropharyngeal dysphagia [R13.12]          General:  General  Reason for Referral: LLE wound, swelling  Referred By: Chandra  Past Medical History Relevant to Rehab: L foot wound, Hx. of CHF, A.Fib., non-hodgkins lymphoma, LE blood clot with IVC filter, dementia  Caregiver Feedback: dtr. and granddtr. present  Patient Position Received: Bed, 3 rail up  General Comment: pt. confused, initially stated need to use bathroom, then no longer once sitting up EOB, L foot gauze-wrapped  Precautions:  Medical Precautions: Fall precautions  Precautions Comment: podiatry consult for L foot  Vital Signs: see nurses notes      Pain:  Pain Assessment  Pain Assessment: 0-10  Pain Score: 0 - No pain    Objective   Cognition:  Overall Cognitive Status: Impaired at baseline (decreased cognition for last few months per S.T.)  Orientation Level:  (O X " 1) pt. distractible by IV line, irrelevant concerns , confused , frequent cuing to focus on task at hand   Memory:  (impaired)           Home Living:  Type of Home: House  Lives With: Adult children (daughter and granddtr.)  Home Adaptive Equipment: Walker rolling or standard (rollator)  Home Layout: One level  Prior Function:  Level of [place]: Needs assistance with ADLs, Needs assistance with functional transfers  Receives Help From: Family  ADL Assistance: Needs assistance  Bath: Maximal  Prior Function Comments: fairly incont. lately in diaper  IADL History:  Homemaking Responsibilities: No  ADL:  LE Dressing Assistance: Total  Toileting Assistance with Device: Total  Activity Tolerance: pt. elected to return to bed after sitting and standing 2 X at bedside      Bed Mobility/Transfers: Bed Mobility  Bed Mobility: Yes (Mod.A to sit and to supine in bed)    Transfers  Transfer: Yes (Max.A to standand pivot with bear-hug stance, as shown by dtr.)      Ambulation/Gait Training: per daughter, pt.  stand-pivoted to rollator seat , no amb. at home lately    Sitting Balance:  Static Sitting Balance  Static Sitting-Balance Support:  (SBA)  Standing Balance:  Static Standing Balance  Static Standing-Balance Support:  (Max.A support for static stand , pt. unable to attain full balance)         Strength:  Strength Comments: UEs WFL UEs     Coordination:  Movements are Fluid and Coordinated: Yes          Outcome Measures:[facility] Daily Activity  Putting on and taking off regular lower body clothing: Total  Bathing (including washing, rinsing, drying): Total  Putting on and taking off regular upper body clothing: A little  Toileting, which includes using toilet, bedpan or urinal: Total  Taking care of personal grooming such as brushing teeth: A lot  Eating Meals: A little  Daily Activity - Total Score: 11        Education Documentation  ADL Training, taught by Sona Navas OT at 11/27/2023  4:13 PM.  Learner:  Patient  Readiness: Acceptance  Method: Demonstration  Response: Needs Reinforcement  Comment: transfer training    Education Comments  No comments found.        OP EDUCATION:       Goals:  Encounter Problems       Encounter Problems (Active)       ADLs       Pt. to demo. grooming, UB bathing while seated EOB with cuing and set-up  (Progressing)       Start:  11/27/23    Expected End:  12/11/23               BALANCE       Demo. CGA static stand for 1 min. with FWW for ADL assist.  (Progressing)       Start:  11/27/23    Expected End:  12/11/23               TRANSFERS       Demo. Min.A trfrs. to/from chair or BSC  (Progressing)       Start:  11/27/23    Expected End:  12/11/23

## 2023-11-27 NOTE — ASSESSMENT & PLAN NOTE
Hold home medications for right now although patient is edematous she is tachycardic and with lactic acidosis  Monitor BP and adjust as necessary  She remains on RA  Monitor volume status closely  Appreciate cardiology recommendations  Appreciate ICU monitoring   Echo from 11/29/23: EF 10-15%, grade 1 diastolic dysfunction, reduced right ventricular systolic function, mild to moderate mitral valve regurgitation, moderate to severe tricuspid valve regurgitation, moderate aortic valve regurgitation, and a large pleural effusion.   Cardiology offered L/RHC- family wishes for hospice care

## 2023-11-27 NOTE — ASSESSMENT & PLAN NOTE
Had failed 2 rounds of outpatient Keflex  Wound culture  Start on broad-spectrum antibiotics per ID  ID consultation  CARRIE without ischemia  MRI without OM or abscess  CRP 12+  Podiatry consultation appreciated  Local wound care  Hospice consult- plan dc to home with hospice on 11/30/23  NO ESCALATION OR DEESCALATION OF CARE TODAY

## 2023-11-27 NOTE — PROGRESS NOTES
11/27/23 1226   Discharge Planning   Living Arrangements Children;Family members   Support Systems Children;Family members   Assistance Needed 1 Assist with walker   Type of Residence Private residence   Home or Post Acute Services In home services   Type of Home Care Services Home nursing visits;Home OT;Home PT   Patient expects to be discharged to: Home   Financial Resource Strain   How hard is it for you to pay for the very basics like food, housing, medical care, and heating? Not hard   Housing Stability   In the last 12 months, was there a time when you were not able to pay the mortgage or rent on time? N   In the last 12 months, was there a time when you did not have a steady place to sleep or slept in a shelter (including now)? N   Transportation Needs   In the past 12 months, has lack of transportation kept you from medical appointments or from getting medications? no   In the past 12 months, has lack of transportation kept you from meetings, work, or from getting things needed for daily living? No     PCP is Harsha Lopes. Due to patient confusion, spoke to daughter and grandson. Patient has been getting weaker. Patients family is not interested in SNF if recommended, interested in HHC when medically ready. PT/ OT pending. Family is interested in Wheelchair on discharge.     11/29 Discussed discharge planning during interdisciplinary rounds with OLGA Mosquera. Patient is not medically ready for discharge. Patients family is meeting with [facility] on 11/30.

## 2023-11-27 NOTE — CARE PLAN
The patient's goals for the shift include  No increased redness in LLE by end of shift.     The clinical goals for the shift include to not have any falls this shift   Pt daughter has stayed at bedside to help keep pt calm and oriented. No increased redness to LLE, No falls or new injuries. Monitoring.

## 2023-11-27 NOTE — PROGRESS NOTES
Pharmacy to Dose Vancomycin - Results Assessment    Patricia Jewell is a 88 y.o. female admitted for Cellulitis, unspecified cellulitis site. Pharmacy was consulted for vancomycin dosing and monitoring. Today is day 2 of vancomycin therapy.      Assessment  Vital signs reviewed, including temperature, HR, BP, I/Os.   Available lab results reviewed, including:WBC, serum creatinine, and BUN.    Recent vancomycin received: 1500mg  once @ 1922 11/26.             Trough level assessment:                   Plan     Within goal trough range. Continue current vancomycin regimen.  Expected vancomycin duration: 750mg q24h starting 2000 11/27/23.   Follow for continued vancomycin need, clinical response, and s/s of toxicity.     Keyon Leigh, PharmD

## 2023-11-27 NOTE — PROGRESS NOTES
Speech-Language Pathology    Inpatient Speech-Language Pathology Clinical Swallow Evaluation    Patient Name: Patricia Jewell  MRN: [id number]  Today's Date: 11/27/2023   Time Calculation  Start Time: 1405  Stop Time: 1504  Time Calculation (min): 59 min         Current Problem:   1. Cellulitis, unspecified cellulitis site  Vascular US PVR without exercise    Vascular US PVR without exercise      2. Other specified symptoms and signs involving the circulatory and respiratory systems  Vascular US PVR without exercise    Vascular US PVR without exercise      3. Oropharyngeal dysphagia [R13.12]              Recommendations:  Risk for Aspiration:  (low)  Additional Recommendations: Dysphagia treatment  Solid Diet Recommendations : Regular (IDDSI Level 7)  Liquid Diet Recommendations: Thin (IDDSI Level 0)  Compensatory Swallowing Strategies: Upright 90 degrees as possible for all oral intake, Remain upright for 20-30 minutes after meals, Single sips, Small bites/sips, Eat/feed slowly (refulx precautions)        Assessment:  Assessment Results: Pt exhibited no overt coughing or choking with oral trials.  Pt demonstrating poor oral intake and concern for esophageal dysphagia.  Implement compensatory strategies and handout provided to family regarding dysmotility precautions.  Prognosis: Fair, Good  Treatment Provided: No  Medical Staff Made Aware: Yes      Plan:  SLP Plan: Skilled SLP  SLP Frequency: 2x per week  Duration: 2 weeks  SLP Discharge Recommendations: Home with no further SLP  Diet Recommendations: Solid, Liquid  Solid Consistency: Regular (IDDSI Level 7)  Liquid Consistency: Thin (IDDSI Level 0)  Next Treatment Priority: Assess PO with regular and thin liquids  Discussed POC: Patient, Caregiver/family, Nursing, Physician, Other (Comment)  Discussed Risks/Benefits: Yes, Patient, Caregiver/Family, Nursing, Physician  Patient/Caregiver Agreeable: Yes  SLP - OK to Discharge: Yes (when medically stable as determined by  physician)      Dysphagia Goals:   Pt will consume Regular diet Texture and Thin liquids  with reduced risk of penetration and or aspiration >95% of meals  Pt will utilize compensatory strategies to improve oral intake.      Subjective   Current Problem:  Pt is awake and alert.  She is confused.  Family reports decreased oral intake for about 4 weeks or so, but significant wt loss since summer when pt was wearing size 14 now she is a size 8.    No overt coughing or choking with foods noted.  However, dtr reported pt will c/o food sticking in her upper chest areas as pt will massage the upper sternum.  Increased belching also noted with oral intake.       General Visit Information:  Patient Class: Inpatient  Living Environment: Lives alone (However, someone is with her nearly all day and night.)  Caregiver Feedback: Family reports pt has gone from size 14 to size 8 since summer. Over the last 3 weeks very noticeable decreased appetite and poor oral intake. Pt reporting she'd already eaten, but she hadn't. Dtr reported pt saying food stuck in the upper chest area with increased amount of belching with oral intake.  Ordering Physician: Eveline Artis  Reason for Referral: Pt had gagging episode when drinking soda pop via straw with coughing after the swallow. . Concern for aspiration  Past Medical History Relevant to Rehab: CHF, atrial fibrillation on Eliquis, hypertension, history of non-Hodgkin's lymphoma in remission since 2005, history of right lower extremity blood clot s/p IVC filter.   Dtr reports after her ankle surgery in March of this year she as been having episodes consistent with dementia. Forgetful, perseveration on situations. etc. (who presented to the emergency department 11/26/2023 with complaints of erythema, swelling, purulence from left lower extremity wound.)  Date of Onset: 11/26/23  Date of Order: 11/27/23  [employer] Diet: Regular diet and THIN Liquids  Current Diet : NPO pending swallow  eval  Dysphagia Diagnosis: Within functional limits (Suspect esophageal dysphagia contributing)      Baseline Assessment:  Respiratory Status: Room air  Behavior/Cognition: Alert, Cooperative, Pleasant mood, Confused (becoming agitated because she wants her money.  Dtr and granddtr present and reminding her that her wallet/purse is at home.)  Hearing: Within Functional Limits      Pain:  The pt had no c/o pain.         Objective   Oral/Motor Assessment:  Oral Hygiene: seems function  Dentition: Dentures (Upper Full) (lower implants)  Oral Motor: Unable to Complete  Intelligibility: Intelligible  Breath Support: Adequate for speech  Hearing: Within Functional Limits      Consistencies Trialed:  Consistencies Trialed: Yes  Consistencies Trialed: Thin (IDDSI Level 0) - Straw, Soft & bite sized/chopped (IDDSI Level 6), Regular (IDDSI Level 7)      Clinical Observations:  Patient Positioning: Partially/Semi Reclined  Management of Oral Secretions: Adequate  Was The 3 oz Swallow Protocol Completed: No  Clinical Observation Comment: Pt fed herself.  She consumed sips of thin liquid (soda) via straw.  SLP stirred the soda to dissipate bubbles.   Pt consumed, yogurt bites and 2 pretzels.     Oral phase: Pt had adequate mastication for solid foods.  Piecemeal deglutition noted.  No oral residues noted after the final swallow.    Pharyngeal phase: Pt had adequate hyolaryngeal elevation and excursion.  Pt had no overt throat clearing, coughing or choking with oral intake.    Pt demonstrating overall decreased appetite.  Belching noted with oral intake.      Family provided with GERD/Dysmotility handout.    Will d/w physician consult for dietitian and possibility of appetite stimulant if appropriate d/t significant wt loss in a short period of time.         Inpatient Education:  Adult Inpatient Education  Individual(s) Educated: Patient, Child (granddaughter, RN and physician)  Verbal Education : reinstate diet, encourage oral  inakte, reflux /dysmotility precautions.  Risk and Benefits Discussed with Patient/Caregiver/Other: yes  Patient/Caregiver Demonstrated Understanding: yes  Plan of Care Discussed and Agreed Upon: yes  Patient Response to Education: Patient/Caregiver Verbalized Understanding of Information

## 2023-11-27 NOTE — H&P
History Of Present Illness  Patricia Jewell is a 88 y.o. female with CHF, atrial fibrillation on Eliquis, hypertension, history of non-Hodgkin's lymphoma in remission since 2005, history of right lower extremity blood clot s/p IVC filter who presented to the emergency department 11/26/2023 with complaints of erythema, swelling, purulence from left lower extremity wound.  Patient's daughter at bedside to assist with history as patient is slightly confused (apparently does have some mild memory issues at baseline per daughter).  Apparently patient has had a wound on her left lower extremity since approximately mid October.  She has been seen for this wound multiple times by both emergency department and PCP, has completed 2 rounds of Keflex prior to this.  Daughter at bedside reports they had been doing local wound care at home with Epsom salt soaks and Neosporin however due to the swelling in the legs it appears she had a wound open on the top of her foot sometime in the last 1 month.  Daughter reports the erythema began to worsen and pain worsened which prompted their visit to the emergency department yesterday.  She denies any fevers or chills though states she is cold all the time.  Denies any lightheadedness or weakness.  Daughter does report her mental status is slightly worse than baseline.  She denies any chest pain, shortness of breath, nausea, vomiting, diarrhea, urinary symptoms.    In the ED, vitals: Afebrile, , RR 18, /85, 93% on room air.  BMP with slight hyponatremia 134.  Creatinine is elevated 1.13 (approximately her baseline).  Magnesium low 1.56.  CBC without leukocytosis.  Blood culture obtained.  VBG with pH 7.45, PCO2 41.  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  Lactate  3.8-> 3.3.  Patient given broad-spectrum antibiotics and admitted to medicine for further management.    Addendum: Patients lactate continued to rise. I discussed the case in depth with Dr. Shaffer around 1700, concern regarding rising lactate, low/normal BP, and volume overload as evidence by BLE edema and elevated BNP. We decided not to continue further IVF but will give albumin 25 grams at this time. Continue to follow clinically and repeat lactate in the morning. Dr. Shaffer did independently evaluate patient shortly after our conversation and continues to agree with same plan. Check echo     Past Medical History  She has a past medical history of Fracture of left shoulder girdle, part unspecified, subsequent encounter for fracture with delayed healing, History of falling, Personal history of non-Hodgkin lymphomas, Personal history of other venous thrombosis and embolism, and Personal history of pulmonary embolism.    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       Social History  She reports that she quit smoking about 16 years ago. Her smoking use included cigarettes. She has a 10.00 pack-year smoking history. She has been exposed to tobacco smoke. She has never used smokeless tobacco. She reports that she does not currently use alcohol. She reports that she does not currently use drugs. Quit smoking in 2008.  Lives alone however daughter reports that either she or the grandson are there nearly 24/7.    Family History  Family History   Problem Relation Name Age of Onset    Hypertension Mother      Hyperlipidemia Mother      Hypertension Father      Hyperlipidemia Father      Esophageal cancer Brother           Allergies  Patient has no known allergies.    Code Status  Full Code     Review of Systems   Constitutional:  Negative for appetite change, chills, diaphoresis, fatigue and fever.   HENT:  Negative for congestion, ear pain, facial swelling, hearing loss, nosebleeds, sore throat, tinnitus and trouble swallowing.    Eyes:  Negative for pain.   Respiratory:  Negative for cough, chest tightness, shortness of breath and wheezing.    Cardiovascular:  Positive for leg swelling. Negative for chest pain and palpitations.   Gastrointestinal:  Negative for abdominal pain, blood in stool, constipation, diarrhea, nausea and vomiting.   Genitourinary:  Negative for dysuria, flank pain, frequency, hematuria and urgency.   Musculoskeletal:  Positive for gait problem. Negative for back pain and joint swelling.        L foot pain   Skin:  Positive for rash and wound.   Neurological:  Positive for weakness. Negative for dizziness, syncope, light-headedness, numbness and headaches.   Hematological:  Does not bruise/bleed easily.   Psychiatric/Behavioral:  Positive for confusion. Negative for behavioral problems, hallucinations and suicidal ideas.        Last Recorded Vitals  /83   Pulse 76   Temp 36.8 °C (98.2 °F)   Resp 18   Wt 61 kg (134 lb 7.7 oz)   SpO2 96%      Physical Exam  Constitutional:       General: She is not in acute distress.     Appearance: Normal appearance.      Comments: Elderly   HENT:      Head: Normocephalic and atraumatic.      Right Ear: External ear normal.      Left Ear: External ear normal.      Nose: Nose normal.      Mouth/Throat:      Mouth: Mucous membranes are moist.      Pharynx: Oropharynx is clear.   Eyes:      Extraocular Movements: Extraocular movements intact.      Conjunctiva/sclera: Conjunctivae normal.      Pupils: Pupils are equal, round, and reactive to light.   Cardiovascular:      Rate and Rhythm: Normal rate and regular rhythm.      Pulses: Normal pulses.      Heart sounds:  Normal heart sounds.   Pulmonary:      Effort: Pulmonary effort is normal. No respiratory distress.      Breath sounds: Normal breath sounds. No wheezing, rhonchi or rales.   Abdominal:      General: Abdomen is flat. Bowel sounds are normal.      Palpations: Abdomen is soft.      Tenderness: There is no abdominal tenderness. There is no right CVA tenderness, left CVA tenderness, guarding or rebound.   Musculoskeletal:         General: No swelling. Normal range of motion.      Cervical back: Normal range of motion and neck supple.      Right lower leg: Edema (2-3+) present.      Left lower leg: Edema (3-4+) present.   Skin:     General: Skin is warm and dry.      Capillary Refill: Capillary refill takes less than 2 seconds.      Findings: Erythema, lesion and rash present.      Comments: L dorsal foot with two areas of purulence and dense surrounding erythema  L heel with skin slough    Neurological:      General: No focal deficit present.      Mental Status: She is alert. Mental status is at baseline. She is disoriented.   Psychiatric:         Mood and Affect: Mood normal.         Behavior: Behavior normal.          Relevant Results  Results for orders placed or performed during the hospital encounter of 11/26/23 (from the past 24 hour(s))   CBC and Auto Differential   Result Value Ref Range    WBC 9.7 4.4 - 11.3 x10*3/uL    nRBC 0.0 0.0 - 0.0 /100 WBCs    RBC 5.19 4.00 - 5.20 x10*6/uL    Hemoglobin 13.0 12.0 - 16.0 g/dL    Hematocrit 42.4 36.0 - 46.0 %    MCV 82 80 - 100 fL    MCH 25.0 (L) 26.0 - 34.0 pg    MCHC 30.7 (L) 32.0 - 36.0 g/dL    RDW 21.9 (H) 11.5 - 14.5 %    Platelets 201 150 - 450 x10*3/uL    Neutrophils % 89.0 40.0 - 80.0 %    Immature Granulocytes %, Automated 0.5 0.0 - 0.9 %    Lymphocytes % 5.9 13.0 - 44.0 %    Monocytes % 4.2 2.0 - 10.0 %    Eosinophils % 0.1 0.0 - 6.0 %    Basophils % 0.3 0.0 - 2.0 %    Neutrophils Absolute 8.65 (H) 1.60 - 5.50 x10*3/uL    Immature Granulocytes Absolute,  Automated 0.05 0.00 - 0.50 x10*3/uL    Lymphocytes Absolute 0.57 (L) 0.80 - 3.00 x10*3/uL    Monocytes Absolute 0.41 0.05 - 0.80 x10*3/uL    Eosinophils Absolute 0.01 0.00 - 0.40 x10*3/uL    Basophils Absolute 0.03 0.00 - 0.10 x10*3/uL   Comprehensive metabolic panel   Result Value Ref Range    Glucose 125 (H) 74 - 99 mg/dL    Sodium 134 (L) 136 - 145 mmol/L    Potassium 4.2 3.5 - 5.3 mmol/L    Chloride 97 (L) 98 - 107 mmol/L    Bicarbonate 24 21 - 32 mmol/L    Anion Gap 17 10 - 20 mmol/L    Urea Nitrogen 27 (H) 6 - 23 mg/dL    Creatinine 1.13 (H) 0.50 - 1.05 mg/dL    eGFR 47 (L) >60 mL/min/1.73m*2    Calcium 8.9 8.6 - 10.3 mg/dL    Albumin 3.4 3.4 - 5.0 g/dL    Alkaline Phosphatase 92 33 - 136 U/L    Total Protein 6.1 (L) 6.4 - 8.2 g/dL    AST 34 9 - 39 U/L    Bilirubin, Total 1.4 (H) 0.0 - 1.2 mg/dL    ALT 15 7 - 45 U/L   Magnesium   Result Value Ref Range    Magnesium 1.56 (L) 1.60 - 2.40 mg/dL   Blood Gas Venous Full Panel   Result Value Ref Range    POCT pH, Venous 7.45 (H) 7.33 - 7.43 pH    POCT pCO2, Venous 41 41 - 51 mm Hg    POCT pO2, Venous 23 (L) 35 - 45 mm Hg    POCT SO2, Venous 13 (L) 45 - 75 %    POCT Oxy Hemoglobin, Venous 12.9 (L) 45.0 - 75.0 %    POCT Hematocrit Calculated, Venous 39.0 36.0 - 46.0 %    POCT Sodium, Venous 128 (L) 136 - 145 mmol/L    POCT Potassium, Venous 4.6 3.5 - 5.3 mmol/L    POCT Chloride, Venous 97 (L) 98 - 107 mmol/L    POCT Ionized [place], Venous 1.10 1.10 - 1.33 mmol/L    POCT Glucose, Venous 137 (H) 74 - 99 mg/dL    POCT Lactate, Venous 3.8 (H) 0.4 - 2.0 mmol/L    POCT Base Excess, Venous 4.1 (H) -2.0 - 3.0 mmol/L    POCT HCO3 Calculated, Venous 28.5 (H) 22.0 - 26.0 mmol/L    POCT Hemoglobin, Venous 13.1 12.0 - 16.0 g/dL    POCT Anion Gap, Venous 7.0 (L) 10.0 - 25.0 mmol/L    Patient Temperature 37.0 degrees Celsius    FiO2 21 %   Morphology   Result Value Ref Range    RBC Morphology See Below     Target Cells Few     Ovalocytes Few     [place] Cells Few     Vacuolated  Neutrophils Present    Blood Gas Lactic Acid, Venous   Result Value Ref Range    POCT Lactate, Venous 3.3 (H) 0.4 - 2.0 mmol/L   Blood Culture    Specimen: Peripheral Venipuncture; Blood culture   Result Value Ref Range    Blood Culture Loaded on Instrument - Culture in progress    Blood Culture    Specimen: Peripheral Venipuncture; Blood culture   Result Value Ref Range    Blood Culture Loaded on Instrument - Culture in progress    CBC   Result Value Ref Range    WBC 8.5 4.4 - 11.3 x10*3/uL    nRBC 0.0 0.0 - 0.0 /100 WBCs    RBC 4.69 4.00 - 5.20 x10*6/uL    Hemoglobin 12.0 12.0 - 16.0 g/dL    Hematocrit 38.4 36.0 - 46.0 %    MCV 82 80 - 100 fL    MCH 25.6 (L) 26.0 - 34.0 pg    MCHC 31.3 (L) 32.0 - 36.0 g/dL    RDW 21.7 (H) 11.5 - 14.5 %    Platelets 202 150 - 450 x10*3/uL   Basic metabolic panel   Result Value Ref Range    Glucose 77 74 - 99 mg/dL    Sodium 135 (L) 136 - 145 mmol/L    Potassium 4.1 3.5 - 5.3 mmol/L    Chloride 96 (L) 98 - 107 mmol/L    Bicarbonate 27 21 - 32 mmol/L    Anion Gap 16 10 - 20 mmol/L    Urea Nitrogen 26 (H) 6 - 23 mg/dL    Creatinine 1.09 (H) 0.50 - 1.05 mg/dL    eGFR 50 (L) >60 mL/min/1.73m*2    Calcium 8.8 8.6 - 10.3 mg/dL   POCT GLUCOSE   Result Value Ref Range    POCT Glucose 73 (L) 74 - 99 mg/dL   Lactate   Result Value Ref Range    Lactate 3.1 (H) 0.4 - 2.0 mmol/L   POCT GLUCOSE   Result Value Ref Range    POCT Glucose 69 (L) 74 - 99 mg/dL      CT angio lower extremity left w and or wo IV contrast    Result Date: 11/26/2023  INDICATION:  Multiple left foot wounds with purple discoloration and cold foot.  Assess for arterial occlusion. TECHNIQUE:  CTA images were obtained through the left lower extremity without and with intravenous contrast.   Omnipaque 350, 100 mL was administered intravenously.  Images are reviewed and processed at a workstation according to the CT angiogram protocol with 3-D and/or MIP post processing imaging generated. Automated mA/kV exposure control was  utilized and patient examination was performed in strict accordance with principles of ALARA. COMPARISON:  US venous left lower extremity 10/09/2023.  XR left foot 10/09/2023. FINDINGS:  Limited imaging of the abdomen demonstrates an IVC filter.  There is extensive atherosclerotic disease and mural thrombus in the abdominal aorta there is a mild stenosis of the inferior mesenteric artery.  The left renal artery is not fully evaluated but does not appear to have flow limiting stenosis.  There is no evidence of abdominal aortic aneurysmal disease. There is colonic diverticulosis without evidence of diverticulitis. . This been previous hysterectomy.  Sclerotic lesion is seen in the right iliac bones.  Plain film radiography is recommended.  Clinical correlation is advised. On the right: Common iliac artery is patent.  The external iliac artery is patent.  The right common femoral artery is not visualized remain in the right lower extremity was not studied. On the left: Common iliac artery is patent.  The external iliac artery is patent.  Common femoral artery is patent.  The superficial femoral artery is patent.  Popliteal artery is patent. Anterior tibial artery, tibioperoneal trunk, posterior tibial artery, peroneal artery, dorsalis pedis artery and pedal posterior tibial artery are patent. Soft tissues of the left lower extremity are diffusely thickened consistent with advanced edema.  The musculature is poorly defined due to the edema which extends into muscular compartments.  Underlying cellulitis is not excluded.  There is a left knee joint effusion. Degenerative changes are noted at the left knee.    1. IVC filter in place. 2. Extensive atherosclerotic disease of the abdominal aorta without evidence of aneurysmal disease. 3. Mild stenosis of the inferior mesenteric artery.  4. Extensive cellulitis or soft tissue edema involving the entire left lower extremity. On the right: 1.  No evidence of iliac artery  stenosis. 2.  The right common femoral artery was not studied. On the left: 1.  Cellulitis or soft tissue edema involving the entire left lower extremity. 2.  No evidence of arterial occlusion. Signed by Aflredito Artis M.D.     Scheduled medications:  amiodarone, 200 mg, oral, Daily  apixaban, 2.5 mg, oral, BID  carvedilol, 6.25 mg, oral, BID  cefepime, 2 g, intravenous, q12h  furosemide, 40 mg, oral, Daily  magnesium oxide, 400 mg, oral, Daily  polyethylene glycol, 17 g, oral, Daily  vancomycin, 750 mg, intravenous, q24h      Continuous medications:  sodium chloride 0.9%, 100 mL/hr, Last Rate: 100 mL/hr (11/27/23 1230)      PRN medications:  PRN medications: acetaminophen **OR** acetaminophen **OR** acetaminophen, ondansetron **OR** ondansetron        Assessment/Plan   Principal Problem:    Cellulitis of left leg  Active Problems:    Weakness    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)    Paroxysmal atrial fibrillation (CMS/HCC)    Aspiration into airway    History of non-Hodgkin's lymphoma    History of DVT of lower extremity      * Cellulitis of left leg  Assessment & Plan  Had failed 2 rounds of outpatient Keflex  Wound culture  Start on broad-spectrum antibiotics-cefepime and vancomycin  ID consultation  Obtain MRI neck  Obtain PVR  Check ESR/CRP  Podiatry consultation appreciated  Local wound care    History of DVT of lower extremity  Assessment & Plan  IVC filter in place  Continue home Eliquis  No evidence of blood clot at this time    History of non-Hodgkin's lymphoma  Assessment & Plan  In remission since 2005    Aspiration into airway  Assessment & Plan  Concern for aspiration as noted per bedside RN  Make n.p.o.  SLP to evaluate    Paroxysmal atrial fibrillation (CMS/HCC)  Assessment & Plan  Continue home Eliquis  Continue rate control as per home  Monitor on telemetry    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)  Assessment & Plan  Hold home medications for  right now although patient is edematous she is tachycardic and with lactic acidosis  Monitor BP and adjust as necessary    Weakness  Assessment & Plan  PT/OT        DVT ppx: Home Eliquis       Please see orders for more complete plan    Eveline Artis PA-C

## 2023-11-27 NOTE — CARE PLAN
The patient's goals for the shift include      The clinical goals for the shift include to not have any falls this shift

## 2023-11-27 NOTE — CONSULTS
Reason For Consult  cellulitis    History Of Present Illness  Miss Patricia Jewell is presenting for left foot cellulitis.   Patient's family by bedside report that she has first developed cellulitis in October and was treated inpatient then transitioned to PO abx. Patient has improved and they were following wound care also. Since last few days, left foot appears  more swollen and erythematous and they brought patient here.      Past Medical History  She has a past medical history of Fracture of left shoulder girdle, part unspecified, subsequent encounter for fracture with delayed healing, History of falling, Personal history of non-Hodgkin lymphomas, Personal history of other venous thrombosis and embolism, and Personal history of pulmonary embolism.    Surgical History  She has a past surgical history that includes Other surgical history (2016); Hysterectomy (2016); Other surgical history (2016); Cataract extraction (10/10/2018); Hernia repair (10/05/2016); XR chest pacemaker w fluoro (3/16/2023); and CT angio lower extremity left w and or wo IV contrast (Left, 2023).     Social History     Occupational History    Not on file   Tobacco Use    Smoking status: Former     Packs/day: 0.50     Years: 20.00     Additional pack years: 0.00     Total pack years: 10.00     Types: Cigarettes     Quit date:      Years since quittin.9     Passive exposure: Past    Smokeless tobacco: Never   Vaping Use    Vaping Use: Never used   Substance and Sexual Activity    Alcohol use: Not Currently    Drug use: Not Currently    Sexual activity: Not on file     Travel History   Travel since 10/27/23    No documented travel since 10/27/23           Family History  Family History   Problem Relation Name Age of Onset    Hypertension Mother      Hyperlipidemia Mother      Hypertension Father      Hyperlipidemia Father      Esophageal cancer Brother       Allergies  Patient has no known allergies.      Immunization History   Administered Date(s) Administered    Moderna SARS-CoV-2 Vaccination 03/27/2021, 04/24/2021    Pneumococcal conjugate vaccine, 13-valent (PREVNAR 13) 11/01/2019    Pneumococcal polysaccharide vaccine, 23-valent, age 2 years and older (PNEUMOVAX 23) 11/10/2020     Medications  Home medications:  Medications Prior to Admission   Medication Sig Dispense Refill Last Dose    acetaminophen (Tylenol) 500 mg tablet Take 2 tablets (1,000 mg) by mouth every 6 hours if needed for mild pain (1 - 3).       amiodarone (Pacerone) 200 mg tablet Take 1 tablet (200 mg) by mouth once daily.       atorvastatin (Lipitor) 40 mg tablet Take 1 tablet (40 mg) by mouth once daily.       carvedilol (Coreg) 12.5 mg tablet Take 0.5 tablets (6.25 mg) by mouth 2 times a day.       cephalexin (Keflex) 250 mg capsule Take 1 capsule (250 mg) by mouth 4 times a day. 28 capsule 0     Eliquis 2.5 mg tablet Take 1 tablet (2.5 mg) by mouth 2 times a day. 180 tablet 1     Farxiga 10 mg Take 1 tablet (10 mg) by mouth once daily in the morning. Take before meals.       furosemide (Lasix) 40 mg tablet Take 1 tablet (40 mg) by mouth once daily.       magnesium, as gluconate, (Magonate) 27 mg magnesium (500 mg) tablet Take 1 tablet (27 mg) by mouth once daily. 60 tablet 11     potassium chloride CR 20 mEq ER tablet Take 1 tablet (20 mEq) by mouth once daily. Do not crush or chew.       sacubitriL-valsartan (Entresto) 49-51 mg tablet Take 1 tablet by mouth 2 times a day. 180 tablet 3     spironolactone (Aldactone) 25 mg tablet Take 1 tablet (25 mg) by mouth once daily. 90 tablet 1      Current medications:  Scheduled medications  cefepime, 2 g, intravenous, q12h  lactated Ringer's, 1,000 mL, intravenous, Once  polyethylene glycol, 17 g, oral, Daily  vancomycin, 750 mg, intravenous, q24h      Continuous medications  sodium chloride 0.9%, 100 mL/hr, Last Rate: 100 mL/hr (11/27/23 0622)      PRN medications  PRN medications: acetaminophen  "**OR** acetaminophen **OR** acetaminophen, ondansetron **OR** ondansetron    Review of Systems  As per HPI     Objective  Range of Vitals (last 24 hours)  Heart Rate:  []   Temp:  [35.9 °C (96.6 °F)-36.8 °C (98.3 °F)]   Resp:  [16-18]   BP: ()/()   Height:  [162.6 cm (5' 4\")-163 cm (5' 4.17\")]   Weight:  [51.7 kg (114 lb)-61 kg (134 lb 7.7 oz)]   SpO2:  [93 %-95 %]   Daily Weight  11/26/23 : 61 kg (134 lb 7.7 oz)    Body mass index is 22.96 kg/m².     Physical Exam  General: AAO*3  Skin: no rashes  Neck: supple  CVS: S1S2  Chest:CTAB  GI: soft, non tender  : no CVAT  Left foot erythema, edema, old scar present   Psych: alert,oriented  CNS: no FND       Relevant Results  Outside Hospital Results  No  Labs  Results from last 72 hours   Lab Units 11/27/23  0601 11/26/23  1535   WBC AUTO x10*3/uL 8.5 9.7   HEMOGLOBIN g/dL 12.0 13.0   HEMATOCRIT % 38.4 42.4   PLATELETS AUTO x10*3/uL 202 201   NEUTROS PCT AUTO %  --  89.0   LYMPHS PCT AUTO %  --  5.9   MONOS PCT AUTO %  --  4.2   EOS PCT AUTO %  --  0.1     Results from last 72 hours   Lab Units 11/27/23  0601 11/26/23  1535   SODIUM mmol/L 135* 134*   POTASSIUM mmol/L 4.1 4.2   CHLORIDE mmol/L 96* 97*   CO2 mmol/L 27 24   BUN mg/dL 26* 27*   CREATININE mg/dL 1.09* 1.13*   GLUCOSE mg/dL 77 125*   CALCIUM mg/dL 8.8 8.9   ANION GAP mmol/L 16 17   EGFR mL/min/1.73m*2 50* 47*     Results from last 72 hours   Lab Units 11/26/23  1535   ALK PHOS U/L 92   BILIRUBIN TOTAL mg/dL 1.4*   PROTEIN TOTAL g/dL 6.1*   ALT U/L 15   AST U/L 34   ALBUMIN g/dL 3.4     Estimated Creatinine Clearance: 32.2 mL/min (A) (by C-G formula based on SCr of 1.09 mg/dL (H)).  No results found for: \"CRP\", \"SEDRATE\"  No results found for: \"HIV1X2\", \"HIVCONF\", \"IWOCUD8EI\"  No results found for: \"HEPCABINIT\", \"HEPCAB\", \"HCVPCRQUANT\"       Assessment/Plan     Left foot cellulitis   H/o DVT  H/o non hodgkin's lymphoma    Suggest:  C/w vancomycin   Monitor creatinine and check vancomycin " trough   C/w zosyn   Check blood cc  Follow wound cx  Follow MRI left foot  Trend wbc and temps        Rhett Carrington MD

## 2023-11-27 NOTE — PROGRESS NOTES
Nutrition Initial Assessment:   Nutrition Assessment    Reason for Assessment: Admission nursing screening    Medical history per chart: Patricia Jewell is a 88 y.o. female with CHF, atrial fibrillation on Eliquis, hypertension, history of non-Hodgkin's lymphoma in remission since 2005, history of right lower extremity blood clot s/p IVC filter who presented to the emergency department 11/26/2023 with complaints of erythema, swelling, purulence from left lower extremity wound. Presents with left foot cellulitis, ID on consult.   Noted concern for aspiration per nursing. Made NPO-speech consulted    11/27: Patient seen in AM with daughter and grandson at bedside. They report patient eats minimal at home and fluid is a struggle as well.  Denies swallowing issues, but reports stomach upset until recently. They feel this is related to lactose issues and has been using lactaid which has been helping and denies stomach upset at this time. Daughter states she has lost a significant amount of weight since summer going from a size 14 to size 6-8. Current wt hx does not reflect this with weight ranging from 114-121 lbs since April.  She visually has noted muscle/fat losses. She hardly consumes meat/poultry. She likes ice cream and sweets. Encouraged high calorie/protein options with family and ONS options, ie carnation instant breakfast, fairlife protein. Will trial ONS while in the hospital    Nutrition History:  Energy Intake: Poor < 50 %    Current Diet: NPO Diet; Effective now  Supplement(s): No  Average meal Intake during admission: NPO   Average supplement intake during admission: NPO     Nutrition Related Findings:   Oral Symptoms: none     GI symptoms: anorexia.   BM:  Unable to assess  Wound Type: left heel-no staging,(nursing/wound notes provide further details)  Edema: lower extremity edema  Food allergies: NKFA.   Meds/Labs reviewed. lasix    Nutrition Significant Labs:  Results from last 7 days   Lab Units  "11/27/23  0601 11/26/23  1535   GLUCOSE mg/dL 77 125*   SODIUM mmol/L 135* 134*   POTASSIUM mmol/L 4.1 4.2   CHLORIDE mmol/L 96* 97*   CO2 mmol/L 27 24   BUN mg/dL 26* 27*   CREATININE mg/dL 1.09* 1.13*   EGFR mL/min/1.73m*2 50* 47*   CALCIUM mg/dL 8.8 8.9   MAGNESIUM mg/dL  --  1.56*     Results from last 7 days   Lab Units 11/27/23  1058 11/27/23  0624   POCT GLUCOSE mg/dL 69* 73*       Anthropometrics:  Height: 163 cm (5' 4.17\")   Weight: 61 kg (134 lb 7.7 oz)   BMI (Calculated): 22.96           Weight History:   Wt Readings from Last 10 Encounters:   11/26/23 61 kg (134 lb 7.7 oz)-suspect inaccurate 2' fluid   10/31/23 55.2 kg (121 lb 9.6 oz)   10/24/23 57.2 kg (126 lb)   10/20/23 59.2 kg (130 lb 8 oz)   10/13/23 57.7 kg (127 lb 4.8 oz)   10/09/23 52.2 kg (115 lb)   05/16/23 51.7 kg (114 lb)   05/16/23 52.1 kg (114 lb 14.4 oz)   04/19/23 55.3 kg (122 lb)   04/04/23 55.5 kg (122 lb 4.8 oz)      Weight Change %:  Weight History / % Weight Change: Weight gain noted now at 134 lbs likely related to edema, 10/31: patient weighed 55.2 kg, 5/16/23: 51.7 kg, 4/4/23: 55.5 kg        Significant Weight Gain: Fluid related    Nutrition Focused Physical Exam Findings:  Subcutaneous Fat Loss:   Orbital Fat Pads: Mild-Moderate (slight dark circles and slight hollowing)  Buccal Fat Pads: Mild-Moderate (flat cheeks, minimal bounce)  Triceps: Mild-moderate (less than ample fat tissue)  Muscle Wasting:  Temporalis: Mild-Moderate (slight depression)  Pectoralis (Clavicular Region): Mild-Moderate (some protrusion of clavicle)  Deltoid/Trapezius: Mild-Moderate (slight protrusion of acromion process)  Interosseous: Mild-Moderate (slightly depressed area between thumb and forefinger)  Edema:  Edema: +2 mild  Edema Location: left lower extremity    Estimated Needs:   Total Energy Estimated Needs (kCal): 1300 kCal  Method for Estimating Needs: 25 kcal/kg IBW 52 kg  Total Protein Estimated Needs (g): 52 g  Method for Estimating Needs: 1.0 " g/kg  Total Fluid Estimated Needs (mL): 1300 mL  Method for Estimating Needs: 1 mL/kcal        Nutrition Diagnosis   Nutrition Diagnosis:  Malnutrition Diagnosis  Patient has Malnutrition Diagnosis: Yes  Diagnosis Status: New  Malnutrition Diagnosis: Moderate malnutrition related to chronic disease or condition  As Evidenced by: moderate muslce and fat losses, energy intakes <75% estimated needs x 1 month    Nutrition Diagnosis  Patient has Nutrition Diagnosis: Yes  Diagnosis Status (1): New  Nutrition Diagnosis 1: Inadequate oral intake  Related to (1): decreased appetite  As Evidenced by (1): pt and family reports, muscle and fat losses       Nutrition Interventions/Recommendations   Nutrition Interventions and Recommendations:        Nutrition Prescription:  Individualized Nutrition Prescription Provided for : Diet consistency per speech with no therapeutic restrictions. add ensure clear daily. Consult speech and wound care        Nutrition Interventions:   Food and/or Nutrient Delivery Interventions  Interventions: Meals and snacks, Medical food supplement  Meals and Snacks: General healthful diet  Goal: consume >50%  Medical Food Supplement: Commercial beverage  Goal: consume >75%    Nutrition Education:   Nutrition Counseling  Counseling Theoretical Approach: Other (Comment)  Goal: Encouraged small frequent meals high in calories and protein with family       Nutrition Monitoring and Evaluation   Monitoring/Evaluation:   Food/Nutrient Related History Monitoring  Monitoring and Evaluation Plan: Energy intake  Energy Intake: Estimated energy intake  Criteria: meet >75% estimated needs    Body Composition/Growth/Weight History  Monitoring and Evaluation Plan: Weight  Weight: Estimated dry weight  Criteria: maintain dry weight, no edema    Biochemical Data, Medical Tests and Procedures  Monitoring and Evaluation Plan: Electrolyte/renal panel  Electrolyte and Renal Panel: Magnesium, Sodium  Criteria:  wnl    Nutrition Focused Physical Findings  Monitoring and Evaluation Plan: Skin  Skin: Impaired wound healing  Criteria: improve wound healing            Time Spent/Follow-up Reminder:   Follow Up  Time Spent (min): 60 minutes  Last Date of Nutrition Visit: 11/27/23  Nutrition Follow-Up Needed?: Dietitian to reassess per policy  Follow up Comment: 11/30-12/1

## 2023-11-28 PROBLEM — R57.9 SHOCK (MULTI): Status: ACTIVE | Noted: 2023-01-01

## 2023-11-28 PROBLEM — I50.21 ACUTE SYSTOLIC HEART FAILURE (MULTI): Status: ACTIVE | Noted: 2023-01-01

## 2023-11-28 NOTE — SIGNIFICANT EVENT
1.  Received call from nursing staff with regards to low blood pressure of 78/48 on bilateral upper extremities and then 86/41 on the right lower extremity.  2.  Please see initial H&P and subsequent progress notes with regards to presentation and management thus far regarding the left foot cellulitis.  Should be noted patient does have a history of Hodgkin's lymphoma as well as HFrEF.  3.  Patient does have noted bilateral lower extremity edema which is consistent with earlier examination.  The left foot is dressed with some saturation of the dressing.  4.  Patient continued on IV antibiotics as recommended by infectious disease with vancomycin and cefepime.  5.  Patient's home Coreg was given earlier in the evening but will be held going forward.  Unclear exactly why but the patient did receive a dose of Xanax about 2 hours prior to being notified of her blood pressure.  6.  Unclear if patient is developing overwhelming sepsis so a stat CBC/CMP/magnesium/phosphorus/lactate/VBG was ordered  7.  A stat chest x-ray was also ordered to evaluate further for volume overload.  8.  Patient was transferred to the intensive care unit to be started on phenylephrine.  Patient's daughter is at the bedside was updated of the events and agrees with management thus far.    Patient is lethargic and not willing to interact with exam.  She would not open her eyes but was able to tell me her name and date of birth but not able to tell me her current location.  Per the patient's daughter the patient wanted to continue as a full code so her CODE STATUS remains as a full code.  Regular rate and rhythm without any noted murmurs  Diminished breath sounds throughout all lung fields with poor effort and notable crackles versus rhonchi in the mid to lower lung fields right greater than left  Abdomen is soft without any signs of tenderness or guarding on examination  Patient was moving upper extremities equally bilaterally with some decrease  in strength with  strength that 4 out of 5  Bilateral lower extremities were 2+ pitting edema to well above the knees and patient did retract to painful stimuli but would not hold her legs against gravity so strength likely around 2-3 out of 5.  Left lower extremity is erythematous from the toes to just above the mid calf with the dressing mildly saturated    Cellulitis of the left lower extremity  sepsis in setting of cellulitis  CHF (HFrEF)

## 2023-11-28 NOTE — PROCEDURES
Central Line    Date/Time: 11/28/2023 10:10 AM    Performed by: Oziel Hassan MD  Authorized by: Oziel Hassan MD    Consent:     Consent obtained:  Written    Consent given by:  Guardian    Risks, benefits, and alternatives were discussed: yes      Risks discussed:  Bleeding, infection and pneumothorax  Universal protocol:     Procedure explained and questions answered to patient or proxy's satisfaction: yes      Relevant documents present and verified: yes      Patient identity confirmed:  Hospital-assigned identification number and arm band  Pre-procedure details:     Indication(s): central venous access      Hand hygiene: Hand hygiene performed prior to insertion      Sterile barrier technique: All elements of maximal sterile technique followed      Skin preparation:  Chlorhexidine  Anesthesia:     Anesthesia method:  Local infiltration    Local anesthetic:  Lidocaine 1% w/o epi  Procedure details:     Location:  R internal jugular    Patient position:  Trendelenburg    Procedural supplies:  Triple lumen    Catheter size:  7 Fr    Landmarks identified: yes      Ultrasound guidance: yes      Sterile ultrasound techniques: Sterile gel and sterile probe covers were used      Number of attempts:  1    Successful placement: yes    Post-procedure details:     Post-procedure:  Line sutured and dressing applied    Assessment:  Blood return through all ports    Procedure completion:  Tolerated well, no immediate complications

## 2023-11-28 NOTE — PROGRESS NOTES
Physical Therapy                 Therapy Communication Note    Patient Name: Patricia Jewell  MRN: [id number]  Today's Date: 11/28/2023     Discipline: Physical Therapy    Missed Visit Reason: Missed Visit Reason: Patient in a medical procedure (PT OOR IN MRI, DAUGHTER STATES PT. STILL VERY SLEEPY POST THIS AM PROCEDURE, WILL SEE FOR EVAL 11/29)    Missed Time: Attempt    Comment:TIME CHECKED ON 5097

## 2023-11-28 NOTE — PROGRESS NOTES
Patricia Jewell is a 88 y.o. female on day 2 of admission presenting with Shock (CMS/HCC).      Subjective   Patricia Jewell is a 88 y.o. female with CHF, atrial fibrillation on Eliquis, hypertension, history of non-Hodgkin's lymphoma in remission since 2005, history of right lower extremity blood clot s/p IVC filter who presented to the emergency department 11/26/2023 with complaints of erythema, swelling, purulence from left lower extremity wound. Apparently patient has had a wound on her left lower extremity since approximately mid October.  She has been seen for this wound multiple times by both emergency department and PCP, has completed 2 rounds of Keflex prior to this.  Daughter at bedside reports they had been doing local wound care at home with Epsom salt soaks and Neosporin however due to the swelling in the legs it appears she had a wound open on the top of her foot sometime in the last 1 month. 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      11/28/23: [facility] notified of hypotension 78/48. Coreg held, patient moved to ICU for phenylephrine. FULL CODE. Notable crackles R>L mid-lower. Sodium 131, Cr 1.21 (~baseline). Magnesium 1.46. Hemoglobin 9.7 (12.0)- suspect element of hemodilution. No leukocytosis. CXR with severe pulmonary edema and large bilateral effusions. Lactate 3.8-> 3.3-->4.7-->3.3  Afebrile, BP 93/65 with RR 28-37, 100% on unknown amt O2  Palliative care consulted         Review of Systems   Unable to perform ROS: Mental status change          Objective     Last Recorded Vitals  /72   Pulse 101   Temp 35.9 °C (96.6 °F) (Temporal)   Resp 24   Wt 61 kg (134 lb 7.7 oz)   SpO2 100%     Image Results  Transthoracic Echo (TTE) Complete    Result Date: 11/28/2023               [place] [id number]      Phone [phone] Fax [phone] TRANSTHORACIC ECHOCARDIOGRAM REPORT  Patient Name:      SHAZIA TANA Che Physician:    [id number] Wil Leiva MD Study Date:        11/28/2023           Ordering Provider:    [id number] MIR YATESMO MRN/PID:           [id number]             Fellow: Accession#:        [id number]         Nurse: Date of Birth/Age: 1935 / 88      Sonographer:          Laura copeland RDCS Gender:            F                    Additional Staff: Height:            162.56 cm            Admit Date:           11/26/2023 Weight:            60.78 kg             Admission Status:     Inpatient -                                                               Routine BSA:               1.65 m2              Department Location:  [facility] Blood Pressure: 97 /71 mmHg Study Type:    TRANSTHORACIC ECHO (TTE) COMPLETE Diagnosis/ICD: Acute combined systolic (congestive) and diastolic (congestive)                heart failure (CHF)-I50.41 Indication:    Congestive Heart Failure CPT Codes:     Echo Complete w Full Doppler-52045  Study Detail: The following Echo studies were performed: 2D, M-Mode, Doppler and               color flow.  PHYSICIAN INTERPRETATION: Left Ventricle: Left ventricular systolic function is severely decreased, with an estimated ejection fraction of 10-15%. There is global hypokinesis of the left ventricle with minor regional variations. The left ventricular cavity size is normal. Spectral Doppler shows an impaired relaxation pattern of left ventricular diastolic filling. Left Atrium: The left atrium is mildly dilated. Right Ventricle: The right ventricle is mildly enlarged. There is reduced right ventricular systolic  function. Right Atrium: The right atrium is mildly dilated. Aortic Valve: The aortic valve is trileaflet. There is moderate aortic valve regurgitation. The peak instantaneous gradient of the aortic valve is 2.9 mmHg. Mitral Valve: The mitral valve is normal in structure. There is mild to moderate mitral valve regurgitation. Tricuspid Valve: The tricuspid valve is structurally normal. There is moderate to severe tricuspid regurgitation. The Doppler estimated RVSP is mildly elevated at 41.3 mmHg. Echodensity visualized in the IVC. Pulmonic Valve: The pulmonic valve is structurally normal. There is mild pulmonic valve regurgitation. Pericardium: There is no pericardial effusion noted. Pleural: There is a large bilateral pleural effusion. Aorta: The aortic root is normal.  CONCLUSIONS:  1. Left ventricular systolic function is severely decreased with a 10-15% estimated ejection fraction.  2. Spectral Doppler shows an impaired relaxation pattern of left ventricular diastolic filling.  3. There is reduced right ventricular systolic function.  4. There is a large pleural effusion.  5. Mild to moderate mitral valve regurgitation.  6. Mildly elevated RVSP.  7. Moderate to severe tricuspid regurgitation.  8. Moderate aortic valve regurgitation.  9. There is global hypokinesis of the left ventricle with minor regional variations. QUANTITATIVE DATA SUMMARY: 2D MEASUREMENTS:                          Normal Ranges: Ao Root d:     3.20 cm   (2.0-3.7cm) LAs:           4.56 cm   (2.7-4.0cm) IVSd:          0.63 cm   (0.6-1.1cm) LVPWd:         0.60 cm   (0.6-1.1cm) LVIDd:         4.53 cm   (3.9-5.9cm) LVIDs:         4.28 cm LV Mass Index: 49.8 g/m2 LV % FS        5.5 % LA VOLUME:                               Normal Ranges: LA Vol A4C:        61.8 ml    (22+/-6mL/m2) LA Vol A2C:        85.1 ml LA Vol BP:         73.0 ml LA Vol Index A4C:  37.4ml/m2 LA Vol Index A2C:  51.6 ml/m2 LA Vol Index BP:   44.3 ml/m2 LA Area A4C:       21.9 cm2  LA Area A2C:       25.9 cm2 LA Major Axis A4C: 6.6 cm LA Major Axis A2C: 6.7 cm LA Vol A4C:        60.5 ml LA Vol A2C:        87.5 ml RA VOLUME BY A/L METHOD:                       Normal Ranges: RA Area A4C: 23.8 cm2 AORTA MEASUREMENTS:                    Normal Ranges: Ao STJ, d: 2.30 cm (1.7-3.4cm) Asc Ao, d: 3.50 cm (2.1-3.4cm) LV SYSTOLIC FUNCTION BY 2D PLANIMETRY (MOD):                     Normal Ranges: EF-A4C View: 17.1 % (>=55%) EF-A2C View: 18.2 % EF-Biplane:  16.4 % LV DIASTOLIC FUNCTION:                           Normal Ranges: MV Peak E:    0.62 m/s    (0.7-1.2 m/s) MV Peak A:    0.93 m/s    (0.42-0.7 m/s) E/A Ratio:    0.67        (1.0-2.2) MV e'         0.02 m/s    (>8.0) MV lateral e' 0.02 m/s MV medial e'  0.02 m/s MV A Dur:     108.47 msec E/e' Ratio:   31.13       (<8.0) MITRAL VALVE:                Normal Ranges: MV DT: 49 msec (150-240msec) MITRAL INSUFFICIENCY:                           Normal Ranges: PISA Radius:  0.5 cm MR VTI:       132.63 cm MR Vmax:      392.73 cm/s MR Alias Juan: 39.3 cm/s MR Volume:    25.17 ml MR Flow Rt:   74.55 ml/s MR EROA:      0.19 cm2 AORTIC VALVE:                         Normal Ranges: AoV Vmax:      0.85 m/s (<=1.7m/s) AoV Peak P.9 mmHg (<20mmHg) LVOT Max Juan:  0.59 m/s (<=1.1m/s) LVOT VTI:      10.39 cm LVOT Diameter: 1.96 cm  (1.8-2.4cm) AoV Area,Vmax: 2.12 cm2 (2.5-4.5cm2) AORTIC INSUFFICIENCY: AI Vmax:       3.14 m/s AI Half-time:  453 msec AI Decel Time: 1563 msec AI Decel Rate: 204.64 cm/s2  RIGHT VENTRICLE: RV Basal 4.36 cm RV Mid   3.40 cm RV Major 6.0 cm TAPSE:   14.8 mm RV s'    0.13 m/s TRICUSPID VALVE/RVSP:                             Normal Ranges: Peak TR Velocity: 2.89 m/s RV Syst Pressure: 41.3 mmHg (< 30mmHg) IVC Diam:         1.98 cm TV e'             0.1 m/s AORTA: Asc Ao Diam 3.46 cm  [id number] Wil Leiva MD Electronically signed on 2023 at 1:00:55 PM  ** Final **     XR chest 1 view    Result Date: 2023  Interpreted By:  Samuel  Daniella, STUDY: XR CHEST 1 VIEW;  11/28/2023 10:58 am   INDICATION: Signs/Symptoms:placement of central line.   COMPARISON: 11/27/2023   ACCESSION NUMBER(S): [id number]   ORDERING CLINICIAN: MITCH PADILLA   FINDINGS: Cardiac silhouette appears enlarged. There are atherosclerotic changes of the aorta.   There is bilateral parenchymal infiltration. There are bilateral pleural effusions.   Degenerative changes present on the right with tip in the region of the superior vena cava.   There are degenerative changes of the spine.   COMPARISON OF FINDING: The catheter was placed in the interval.       Interval placement of a right jugular catheter. Findings suggestive of congestive heart failure.   MACRO: none   Signed by: Daniella Baker 11/28/2023 11:06 AM Dictation workstation:   [id number]    CARRIE without exercise    Result Date: 11/28/2023  Preliminary Cardiology [place] [address]      Phone [phone] Fax [phone]            Preliminary Vascular Lab Report  [facility] ANKLE BRACHIAL INDEX (CARRIE) WITHOUT EXERCISE  Patient Name:     SHAZIA Che Physician:  [id number] Maribell Rao MD Study Date:       11/28/2023     Ordering Provider:  [id number] KEON DELATORRE MRN/PID:          [id number]       Fellow: Accession#:       [id number]   Technologist:       Yasmin Alamo RVT YOB: 1935     Technologist 2: Gender:           F              Encounter#:         [id number] Admission Status: Inpatient      Location Performed: [facility]  Diagnosis/ICD: Peripheral vascular disease, unspecified-I73.9  PRELIMINARY CONCLUSIONS:  Right Lower PVR: No evidence of arterial occlusive disease in the right lower extremity at rest. Triphasic flow is noted in the right common femoral artery, right posterior tibial artery and right dorsalis pedis artery. Left Lower PVR: No evidence of arterial occlusive disease in the left lower extremity  at rest. Biphasic flow is noted in the left posterior tibial artery. Triphasic flow is noted in the left common femoral artery and left dorsalis pedis artery. Additional Findings: Technically limited due to movement, cooperation, pain tolerance to cuffs.  Imaging & Doppler Findings:  RIGHT Lower PVR                Pressures Ratios Right Posterior Tibial (Ankle) 108 mmHg  1.11 Right Dorsalis Pedis (Ankle)   104 mmHg  1.07 Right Digit (Great Toe)        65 mmHg   0.67   LEFT Lower PVR                Pressures Ratios Left Posterior Tibial (Ankle) 109 mmHg  1.12 Left Dorsalis Pedis (Ankle)   111 mmHg  1.14 Left Digit (Great Toe)        45 mmHg   0.46                      Left Brachial Pressure 97 mmHg   VASCULAR PRELIMINARY REPORT completed by Yasmin Alamo T on 11/28/2023 at 7:44:46 AM  ** Final **     XR chest 1 view    Result Date: 11/28/2023  Interpreted By:  Alfredito Rivera, STUDY: XR CHEST 1 VIEW;  11/27/2023 11:55 pm   INDICATION: Signs/Symptoms:CHF.   COMPARISON: 08/21/2022   ACCESSION NUMBER(S): [id number]   ORDERING CLINICIAN: KEON DELATORRE   FINDINGS: The heart is enlarged The pulmonary vasculature is congested centrally and indistinct peripherally, with interlobular septal thickening and indistinct mid to lower lung consolidative opacities consistent with pulmonary edema. There are large bilateral pleural effusions.       Severe pulmonary edema and large bilateral pleural effusions.     MACRO: None.   Signed by: Alfredito Rivera 11/28/2023 2:16 AM Dictation workstation:   [id number]    CT angio lower extremity left w and or wo IV contrast    Result Date: 11/26/2023  INDICATION:  Multiple left foot wounds with purple discoloration and cold foot.  Assess for arterial occlusion. TECHNIQUE:  CTA images were obtained through the left lower extremity without and with intravenous contrast.   Omnipaque 350, 100 mL was administered intravenously.  Images are reviewed and processed at a workstation according  to the CT angiogram protocol with 3-D and/or MIP post processing imaging generated. Automated mA/kV exposure control was utilized and patient examination was performed in strict accordance with principles of ALARA. COMPARISON:  US venous left lower extremity 10/09/2023.  XR left foot 10/09/2023. FINDINGS:  Limited imaging of the abdomen demonstrates an IVC filter.  There is extensive atherosclerotic disease and mural thrombus in the abdominal aorta there is a mild stenosis of the inferior mesenteric artery.  The left renal artery is not fully evaluated but does not appear to have flow limiting stenosis.  There is no evidence of abdominal aortic aneurysmal disease. There is colonic diverticulosis without evidence of diverticulitis. . This been previous hysterectomy.  Sclerotic lesion is seen in the right iliac bones.  Plain film radiography is recommended.  Clinical correlation is advised. On the right: Common iliac artery is patent.  The external iliac artery is patent.  The right common femoral artery is not visualized remain in the right lower extremity was not studied. On the left: Common iliac artery is patent.  The external iliac artery is patent.  Common femoral artery is patent.  The superficial femoral artery is patent.  Popliteal artery is patent. Anterior tibial artery, tibioperoneal trunk, posterior tibial artery, peroneal artery, dorsalis pedis artery and pedal posterior tibial artery are patent. Soft tissues of the left lower extremity are diffusely thickened consistent with advanced edema.  The musculature is poorly defined due to the edema which extends into muscular compartments.  Underlying cellulitis is not excluded.  There is a left knee joint effusion. Degenerative changes are noted at the left knee.    1. IVC filter in place. 2. Extensive atherosclerotic disease of the abdominal aorta without evidence of aneurysmal disease. 3. Mild stenosis of the inferior mesenteric artery.  4. Extensive  cellulitis or soft tissue edema involving the entire left lower extremity. On the right: 1.  No evidence of iliac artery stenosis. 2.  The right common femoral artery was not studied. On the left: 1.  Cellulitis or soft tissue edema involving the entire left lower extremity. 2.  No evidence of arterial occlusion. Signed by Alfredito Artis M.D.       Lab Results  Results for orders placed or performed during the hospital encounter of 11/26/23 (from the past 24 hour(s))   Blood Culture    Specimen: Peripheral Venipuncture; Blood culture   Result Value Ref Range    Blood Culture Loaded on Instrument - Culture in progress    Blood Culture    Specimen: Peripheral Venipuncture; Blood culture   Result Value Ref Range    Blood Culture Loaded on Instrument - Culture in progress    Vancomycin, Trough   Result Value Ref Range    Vancomycin, Trough 31.1 (HH) 5.0 - 20.0 ug/mL   CBC and Auto Differential   Result Value Ref Range    WBC 7.3 4.4 - 11.3 x10*3/uL    nRBC 0.0 0.0 - 0.0 /100 WBCs    RBC 3.81 (L) 4.00 - 5.20 x10*6/uL    Hemoglobin 9.7 (L) 12.0 - 16.0 g/dL    Hematocrit 31.2 (L) 36.0 - 46.0 %    MCV 82 80 - 100 fL    MCH 25.5 (L) 26.0 - 34.0 pg    MCHC 31.1 (L) 32.0 - 36.0 g/dL    RDW 21.2 (H) 11.5 - 14.5 %    Platelets 151 150 - 450 x10*3/uL    Neutrophils % 79.5 40.0 - 80.0 %    Immature Granulocytes %, Automated 0.5 0.0 - 0.9 %    Lymphocytes % 12.5 13.0 - 44.0 %    Monocytes % 6.7 2.0 - 10.0 %    Eosinophils % 0.4 0.0 - 6.0 %    Basophils % 0.4 0.0 - 2.0 %    Neutrophils Absolute 5.78 (H) 1.60 - 5.50 x10*3/uL    Immature Granulocytes Absolute, Automated 0.04 0.00 - 0.50 x10*3/uL    Lymphocytes Absolute 0.91 0.80 - 3.00 x10*3/uL    Monocytes Absolute 0.49 0.05 - 0.80 x10*3/uL    Eosinophils Absolute 0.03 0.00 - 0.40 x10*3/uL    Basophils Absolute 0.03 0.00 - 0.10 x10*3/uL   Magnesium   Result Value Ref Range    Magnesium 1.46 (L) 1.60 - 2.40 mg/dL   Comprehensive metabolic panel   Result Value Ref Range    Glucose 76 74  - 99 mg/dL    Sodium 131 (L) 136 - 145 mmol/L    Potassium 3.6 3.5 - 5.3 mmol/L    Chloride 96 (L) 98 - 107 mmol/L    Bicarbonate 23 21 - 32 mmol/L    Anion Gap 16 10 - 20 mmol/L    Urea Nitrogen 27 (H) 6 - 23 mg/dL    Creatinine 1.21 (H) 0.50 - 1.05 mg/dL    eGFR 44 (L) >60 mL/min/1.73m*2    Calcium 8.1 (L) 8.6 - 10.3 mg/dL    Albumin 3.1 (L) 3.4 - 5.0 g/dL    Alkaline Phosphatase 59 33 - 136 U/L    Total Protein 4.8 (L) 6.4 - 8.2 g/dL    AST 18 9 - 39 U/L    Bilirubin, Total 1.5 (H) 0.0 - 1.2 mg/dL    ALT 12 7 - 45 U/L   Phosphorus   Result Value Ref Range    Phosphorus 4.1 2.5 - 4.9 mg/dL   BLOOD GAS VENOUS   Result Value Ref Range    POCT pH, Venous 7.42 7.33 - 7.43 pH    POCT pCO2, Venous 36 (L) 41 - 51 mm Hg    POCT pO2, Venous 33 (L) 35 - 45 mm Hg    POCT SO2, Venous 37 (L) 45 - 75 %    POCT Oxy Hemoglobin, Venous 36.3 (L) 45.0 - 75.0 %    POCT Base Excess, Venous -0.7 -2.0 - 3.0 mmol/L    POCT HCO3 Calculated, Venous 23.4 22.0 - 26.0 mmol/L    Patient Temperature 37.0 degrees Celsius    FiO2 21 %    Test Comment [facility] 1-S    Lactate   Result Value Ref Range    Lactate 3.3 (H) 0.4 - 2.0 mmol/L   Morphology   Result Value Ref Range    RBC Morphology See Below     Hypochromia Mild     Ovalocytes Few    CARRIE without exercise   Result Value Ref Range    BSA 1.66 m2   Blood Gas Venous Full Panel   Result Value Ref Range    POCT pH, Venous 7.32 (L) 7.33 - 7.43 pH    POCT pCO2, Venous 44 41 - 51 mm Hg    POCT pO2, Venous 45 35 - 45 mm Hg    POCT SO2, Venous 58 45 - 75 %    POCT Oxy Hemoglobin, Venous 57.6 45.0 - 75.0 %    POCT Hematocrit Calculated, Venous 32.0 (L) 36.0 - 46.0 %    POCT Sodium, Venous 131 (L) 136 - 145 mmol/L    POCT Potassium, Venous 3.4 (L) 3.5 - 5.3 mmol/L    POCT Chloride, Venous 98 98 - 107 mmol/L    POCT Ionized Calicum, Venous 1.11 1.10 - 1.33 mmol/L    POCT Glucose, Venous 83 74 - 99 mg/dL    POCT Lactate, Venous 1.7 0.4 - 2.0 mmol/L    POCT Base Excess, Venous -3.3 (L) -2.0 - 3.0 mmol/L     POCT HCO3 Calculated, Venous 22.7 22.0 - 26.0 mmol/L    POCT Hemoglobin, Venous 10.5 (L) 12.0 - 16.0 g/dL    POCT Anion Gap, Venous 14.0 10.0 - 25.0 mmol/L    Patient Temperature 37.0 degrees Celsius    FiO2 21 %   MRSA Surveillance for Vancomycin De-escalation, PCR    Specimen: Anterior Nares; Swab   Result Value Ref Range    MRSA PCR Not Detected Not Detected   Transthoracic Echo (TTE) Complete   Result Value Ref Range    LVOT diam 1.96     LV biplane EF 16     MV E/A ratio 0.67     MV avg E/e' ratio 31.13     Tricuspid annular plane systolic excursion 1.5     LA vol index A/L 44.3     AV pk anthony 0.85     RV free wall pk S' 12.88     RVSP 41.3     LVIDd 4.53     Aortic Valve Area by Continuity of Peak Velocity 2.12     AV pk grad 2.9     LV A4C EF 17.1         Medications  Scheduled medications:  amiodarone, 200 mg, oral, Daily  apixaban, 2.5 mg, oral, BID  [Held by provider] carvedilol, 6.25 mg, oral, BID  magnesium oxide, 400 mg, oral, Daily  meropenem, 2 g, intravenous, q12h  mirtazapine, 7.5 mg, oral, Nightly  polyethylene glycol, 17 g, oral, Daily  vancomycin, 750 mg, intravenous, q24h      Continuous medications:  [Held by provider] furosemide, 5 mg/hr  norepinephrine in sodium chloride 0.9 %, 0.05-1 mcg/kg/min, Last Rate: 0.06 mcg/kg/min (11/28/23 1245)      PRN medications:  PRN medications: acetaminophen **OR** acetaminophen **OR** acetaminophen, ondansetron **OR** ondansetron     Physical Exam  Constitutional:       General: She is not in acute distress.     Appearance: Normal appearance.      Comments: Sleepy (just given versed)   HENT:      Head: Normocephalic and atraumatic.      Right Ear: External ear normal.      Left Ear: External ear normal.      Nose: Nose normal.      Mouth/Throat:      Mouth: Mucous membranes are moist.      Pharynx: Oropharynx is clear.   Eyes:      Extraocular Movements: Extraocular movements intact.      Conjunctiva/sclera: Conjunctivae normal.      Pupils: Pupils are equal,  round, and reactive to light.   Cardiovascular:      Rate and Rhythm: Normal rate and regular rhythm.      Pulses: Normal pulses.      Heart sounds: Normal heart sounds.   Pulmonary:      Effort: Pulmonary effort is normal. No respiratory distress.      Breath sounds: Normal breath sounds. No wheezing, rhonchi or rales.   Abdominal:      General: Abdomen is flat. Bowel sounds are normal.      Palpations: Abdomen is soft.      Tenderness: There is no abdominal tenderness. There is no right CVA tenderness, left CVA tenderness, guarding or rebound.   Musculoskeletal:         General: No swelling. Normal range of motion.      Cervical back: Normal range of motion and neck supple.      Right lower leg: Edema (2-3+) present.      Left lower leg: Edema (3-4+) present.   Skin:     General: Skin is warm and dry.      Capillary Refill: Capillary refill takes less than 2 seconds.      Findings: Lesion present. No rash.      Comments: L dorsal foot with two areas of purulence and dense surrounding erythema  L heel with skin slough       Neurological:      Mental Status: She is disoriented.   Psychiatric:         Mood and Affect: Mood normal.         Behavior: Behavior normal.                  Code Status  Full Code     Assessment/Plan   This patient currently has cardiac telemetry ordered; if you would like to modify or discontinue the telemetry order, click here to go to the orders activity to modify/discontinue the order.    Cellulitis of left leg  Assessment & Plan  Had failed 2 rounds of outpatient Keflex  Wound culture  Start on broad-spectrum antibiotics per ID  ID consultation  Obtain MRI neck  Obtain PVR  CRP 12+  Podiatry consultation appreciated  Local wound care    Acute systolic heart failure (CMS/HCC)  Assessment & Plan  Hold home medications for right now although patient is edematous she is tachycardic and with lactic acidosis  Monitor BP and adjust as necessary  She remains on RA  Monitor volume status  closely  Appreciate cardiology recommendations  Appreciate ICU monitoring     * Shock (CMS/HCC)  Assessment & Plan  2/2 Sepsis  IV pressors- central like RIJ  ICU consult appreciated    History of DVT of lower extremity  Assessment & Plan  IVC filter in place  Continue home Eliquis  No evidence of blood clot at this time    History of non-Hodgkin's lymphoma  Assessment & Plan  In remission since 2005    Aspiration into airway  Assessment & Plan  Regular diet per SLP    Paroxysmal atrial fibrillation (CMS/[facility])  Assessment & Plan  Continue home Eliquis  Continue rate control as per home  Monitor on telemetry    Weakness  Assessment & Plan  PT/OT           Malnutrition Diagnosis Status: New  Malnutrition Diagnosis: Moderate malnutrition related to chronic disease or condition  As Evidenced by: moderate muslce and fat losses, energy intakes <75% estimated needs x 1 month  I agree with the dietitian's malnutrition diagnosis.      DVT ppx: Home Eliquis       Please see orders for more complete plan    Eveline Artis PA-C

## 2023-11-28 NOTE — ASSESSMENT & PLAN NOTE
2/2 Sepsis  Off IV vasopressors since 11/28/23  ICU consult appreciated  Continue to hold BP medications- can start midodrine if further hypotension

## 2023-11-28 NOTE — PROGRESS NOTES
Physical Therapy                 Therapy Communication Note    Patient Name: Patricia Jewell  MRN: [id number]  Today's Date: 11/28/2023     Discipline: Physical Therapy    Missed Visit Reason: Missed Visit Reason: Patient in a medical procedure (receiving procedure in room, spoke w/ RN who states pt. sedated 2/2/ central line insertion earlier today and is still very groggy, will try again at a later time, attempted at 1124 hrs)    Missed Time: Attempt    Comment:

## 2023-11-28 NOTE — PROGRESS NOTES
Pharmacy Medication History Review    Patricia Jewell is a 88 y.o. female admitted for Cellulitis of left leg. Pharmacy reviewed the patient's [id number] medications and allergies for accuracy.    The list below reflectives the updated PTA list. Please review each medication in order reconciliation for additional clarification and justification.  Medications Prior to Admission   Medication Sig Dispense Refill Last Dose    acetaminophen (Tylenol) 325 mg tablet Take 1 tablet (325 mg) by mouth every 6 hours if needed for mild pain (1 - 3).   Unknown    amiodarone (Pacerone) 200 mg tablet Take 1 tablet (200 mg) by mouth once daily.   Unknown    carvedilol (Coreg) 12.5 mg tablet Take 1.5 tablets (18.75 mg) by mouth 2 times a day.   Unknown    Eliquis 2.5 mg tablet Take 1 tablet (2.5 mg) by mouth 2 times a day. 180 tablet 1 Unknown    Farxiga 10 mg Take 1 tablet (10 mg) by mouth once daily in the morning. Take before meals.   Unknown    furosemide (Lasix) 40 mg tablet Take 1 tablet (40 mg) by mouth once daily.   Unknown    magnesium, as gluconate, (Magonate) 27 mg magnesium (500 mg) tablet Take 1 tablet (27 mg) by mouth once daily. (Patient not taking: Reported on 11/28/2023) 60 tablet 11 Not Taking    potassium chloride CR 20 mEq ER tablet Take 1 tablet (20 mEq) by mouth once daily. Do not crush or chew.   Not Taking    sacubitriL-valsartan (Entresto) 49-51 mg tablet Take 1 tablet by mouth 2 times a day. 180 tablet 3 Unknown    spironolactone (Aldactone) 25 mg tablet Take 1 tablet (25 mg) by mouth once daily. 90 tablet 1 Unknown        The list below reflectives the updated allergy list. Please review each documented allergy for additional clarification and justification.  Allergies  Reviewed by Cristian Espinoza RN on 11/26/2023   No Known Allergies         Below are additional concerns with the patient's PTA list.  Spoke with the patient's daughter who reported that she is non-compliant with medications. Unsure of  when last dose actually was. Called and spoke to [facility] pharmacy and confirmed doses and medications above.     Aide Silva  PharmD Candidate 2024

## 2023-11-28 NOTE — CONSULTS
[facility] Heart and Vascular Cardiology    Patient Name: Patricia Jewell  Patient : 1935  Room/Bed: 15/15-A    Date: 23  Time: 8:38 AM    Referred by Dr. German ref. provider found for left foot swelling/ infection     History Of Present Illness:    Patricia Jewell is a 88 y.o. female who presented to the emergency department with erythema, edema, and purulence from the left lower extremity wound.  Patient's daughter at bedside assists with history as patient is unable to appropriately answer questions.  Patient's daughter reports that the patient is often noncompliant with medical therapy at home.  She will refuse medications or put them in her mouth but spit them out when the daughter is not looking.  The patient became more weak and confused which is what prompted them to come to the emergency department.  BMP shows a serum sodium 131, serum potassium 3.6, serum creatinine of 1.21.  Serum magnesium was 1.46, CRP was 12.47.  BNP was greater than 4700 improved to 2886.  CBC shows a hemoglobin of 12.0.  Chest x-ray shows severe pulmonary edema and large bilateral pleural effusions.  Echocardiogram done 2022 showed low normal left ventricular systolic function with an ejection fraction of 50 to 55%, grade 1 diastolic dysfunction, moderate to severe tricuspid valve regurgitation, and mild to moderate aortic valve regurgitation.  Patient has been started on antibiotic therapy for left foot cellulitis as well as IV vasopressors to support blood pressure.  During my exam patient was resting in bed.    Assessment/Plan:   1.  Acute on chronic diastolic heart failure with a history of cardiomyopathy.  The patient with shortness of breath and lower extremity edema found to have elevated BNP initially of greater than 4700.  Chest x-ray showing severe pulmonary edema and large bilateral pleural effusions.  Vitals report showing periods of tachypnea this morning with respiratory rates as high as 37.  Blood  pressures have been hypotensive at times requiring vasopressor support.  I will start the patient on a furosemide infusion for diuresis.  Would continue IV vasopressors for blood pressure support.  Echocardiogram done November 2022 showed low normal left ventricular systolic function with an ejection fraction of 50 to 55%, grade 1 diastolic dysfunction, moderate to severe tricuspid valve regurgitation, and mild to moderate aortic valve regurgitation.   Repeat echocardiogram has been ordered.  Continue to monitor urine output, renal function, and serum electrolytes while here.    2.  Paroxysmal atrial fibrillation  Patient has a history of paroxysmal atrial fibrillation and has been on apixaban and amiodarone at home.  Patient's daughter reports medication noncompliance at home.  Telemetry currently showing heart rates in the upper 90s to low 100s.  Would continue with current medical therapy.    3.  Tricuspid valve regurgitation  Patient has a history of moderate to severe tricuspid valve regurgitation and appears to be in acutely decompensated heart failure.  Repeat echocardiogram has been ordered.    4.  Coronary artery disease  The patient has a history of nonobstructive coronary artery disease as seen on cardiac catheterization done in August 2022.    5.  Left foot cellulitis  Patient on antibiotic therapy per infectious disease.  Currently on IV norepinephrine for blood pressure support.    Past Medical History:  She has a past medical history of Fracture of left shoulder girdle, part unspecified, subsequent encounter for fracture with delayed healing, History of falling, Personal history of non-Hodgkin lymphomas, Personal history of other venous thrombosis and embolism, and Personal history of pulmonary embolism.    Past Surgical History:  She has a past surgical history that includes Other surgical history (09/14/2016); Hysterectomy (09/14/2016); Other surgical history (09/14/2016); Cataract extraction  (10/10/2018); Hernia repair (10/05/2016); XR chest pacemaker w fluoro (3/16/2023); and CT angio lower extremity left w and or wo IV contrast (Left, 11/26/2023).      Social History:  She reports that she quit smoking about 16 years ago. Her smoking use included cigarettes. She has a 10.00 pack-year smoking history. She has been exposed to tobacco smoke. She has never used smokeless tobacco. She reports that she does not currently use alcohol. She reports that she does not currently use drugs.    Family History:  Family History   Problem Relation Name Age of Onset    Hypertension Mother      Hyperlipidemia Mother      Hypertension Father      Hyperlipidemia Father      Esophageal cancer Brother          Allergies:  Patient has no known allergies.    Outpatient Medications:  Current Outpatient Medications   Medication Instructions    acetaminophen (TYLENOL) 1,000 mg, oral, Every 6 hours PRN    amiodarone (PACERONE) 200 mg, oral, Daily    atorvastatin (LIPITOR) 40 mg, oral, Daily    carvedilol (COREG) 6.25 mg, oral, 2 times daily    cephalexin (KEFLEX) 250 mg, oral, 4 times daily    Eliquis 2.5 mg, oral, 2 times daily    Farxiga 10 mg, oral, Daily before breakfast    furosemide (LASIX) 40 mg, oral, Daily    magnesium (as gluconate) (MAGONATE) 27 mg, oral, Daily    potassium chloride CR 20 mEq ER tablet 20 mEq, oral, Daily, Do not crush or chew.    sacubitriL-valsartan (Entresto) 49-51 mg tablet 1 tablet, oral, 2 times daily    spironolactone (ALDACTONE) 25 mg, oral, Daily        ROS:  A 14 point review of systems was done and is negative other than as stated in HPI    Vitals:  Vitals:    11/28/23 0115 11/28/23 0700 11/28/23 0730 11/28/23 0745   BP: 94/61 84/63 106/66 93/65   BP Location:       Patient Position:       Pulse: 99 93 94 98   Resp: 25 (!) 33 (!) 37 (!) 28   Temp:       TempSrc:       SpO2:  100% 100% 100%   Weight:       Height:           Physical Exam:     Constitutional: Confused, unable to appropriately  answer questions, opens eyes to stimulation  Skin: Skin color, texture, turgor normal. No rashes or lesions.  Head: Normocephalic. No masses, lesions, tenderness or abnormalities  Eyes: Extraocular movements are grossly intact.  Mouth and throat: Mucous membranes moist  Neck: Neck supple, no carotid bruits, no JVD  Respiratory: Rales bilaterally, mildly tachypneic  Chest wall: No scars, normal excursion with respiration  Cardiovascular: Regular rhythm with + murmur  Gastrointestinal: Abdomen soft, nontender. Bowel sounds normal.  Extremities: + pitting edema    Intake/Output:   I/O last 2 completed shifts:  In: 1340 (22 mL/kg) [P.O.:350; I.V.:340 (5.6 mL/kg); IV Piggyback:650]  Out: - (0 mL/kg)   Weight: 61 kg     Outpatient Medications  No current facility-administered medications on file prior to encounter.     Current Outpatient Medications on File Prior to Encounter   Medication Sig Dispense Refill    acetaminophen (Tylenol) 500 mg tablet Take 2 tablets (1,000 mg) by mouth every 6 hours if needed for mild pain (1 - 3).      amiodarone (Pacerone) 200 mg tablet Take 1 tablet (200 mg) by mouth once daily.      atorvastatin (Lipitor) 40 mg tablet Take 1 tablet (40 mg) by mouth once daily.      carvedilol (Coreg) 12.5 mg tablet Take 0.5 tablets (6.25 mg) by mouth 2 times a day.      cephalexin (Keflex) 250 mg capsule Take 1 capsule (250 mg) by mouth 4 times a day. 28 capsule 0    Eliquis 2.5 mg tablet Take 1 tablet (2.5 mg) by mouth 2 times a day. 180 tablet 1    Farxiga 10 mg Take 1 tablet (10 mg) by mouth once daily in the morning. Take before meals.      furosemide (Lasix) 40 mg tablet Take 1 tablet (40 mg) by mouth once daily.      magnesium, as gluconate, (Magonate) 27 mg magnesium (500 mg) tablet Take 1 tablet (27 mg) by mouth once daily. 60 tablet 11    potassium chloride CR 20 mEq ER tablet Take 1 tablet (20 mEq) by mouth once daily. Do not crush or chew.      sacubitriL-valsartan (Entresto) 49-51 mg tablet  Take 1 tablet by mouth 2 times a day. 180 tablet 3    spironolactone (Aldactone) 25 mg tablet Take 1 tablet (25 mg) by mouth once daily. 90 tablet 1    [DISCONTINUED] omeprazole OTC (PriLOSEC OTC) 20 mg EC tablet Take 1 tablet (20 mg) by mouth once daily in the morning. Take before meals. Do not crush, chew, or split.         Scheduled medications  amiodarone, 200 mg, oral, Daily  apixaban, 2.5 mg, oral, BID  [Held by provider] carvedilol, 6.25 mg, oral, BID  cefepime, 2 g, intravenous, q12h  magnesium oxide, 400 mg, oral, Daily  norepinephrine in dextrose 5 %, , ,   polyethylene glycol, 17 g, oral, Daily  vancomycin, 750 mg, intravenous, q24h      Continuous medications  phenylephrine, 0-9 mcg/kg/min, Last Rate: 2 mcg/kg/min (11/28/23 0658)      PRN medications  PRN medications: acetaminophen **OR** acetaminophen **OR** acetaminophen, norepinephrine in dextrose 5 %, ondansetron **OR** ondansetron   Medications Prior to Admission   Medication Sig Dispense Refill Last Dose    acetaminophen (Tylenol) 500 mg tablet Take 2 tablets (1,000 mg) by mouth every 6 hours if needed for mild pain (1 - 3).       amiodarone (Pacerone) 200 mg tablet Take 1 tablet (200 mg) by mouth once daily.       atorvastatin (Lipitor) 40 mg tablet Take 1 tablet (40 mg) by mouth once daily.       carvedilol (Coreg) 12.5 mg tablet Take 0.5 tablets (6.25 mg) by mouth 2 times a day.       cephalexin (Keflex) 250 mg capsule Take 1 capsule (250 mg) by mouth 4 times a day. 28 capsule 0     Eliquis 2.5 mg tablet Take 1 tablet (2.5 mg) by mouth 2 times a day. 180 tablet 1     Farxiga 10 mg Take 1 tablet (10 mg) by mouth once daily in the morning. Take before meals.       furosemide (Lasix) 40 mg tablet Take 1 tablet (40 mg) by mouth once daily.       magnesium, as gluconate, (Magonate) 27 mg magnesium (500 mg) tablet Take 1 tablet (27 mg) by mouth once daily. 60 tablet 11     potassium chloride CR 20 mEq ER tablet Take 1 tablet (20 mEq) by mouth once  daily. Do not crush or chew.       sacubitriL-valsartan (Entresto) 49-51 mg tablet Take 1 tablet by mouth 2 times a day. 180 tablet 3     spironolactone (Aldactone) 25 mg tablet Take 1 tablet (25 mg) by mouth once daily. 90 tablet 1        Recent Labs: (past 2 days)  Recent Results (from the past 48 hour(s))   CBC and Auto Differential    Collection Time: 11/26/23  3:35 PM   Result Value Ref Range    WBC 9.7 4.4 - 11.3 x10*3/uL    nRBC 0.0 0.0 - 0.0 /100 WBCs    RBC 5.19 4.00 - 5.20 x10*6/uL    Hemoglobin 13.0 12.0 - 16.0 g/dL    Hematocrit 42.4 36.0 - 46.0 %    MCV 82 80 - 100 fL    MCH 25.0 (L) 26.0 - 34.0 pg    MCHC 30.7 (L) 32.0 - 36.0 g/dL    RDW 21.9 (H) 11.5 - 14.5 %    Platelets 201 150 - 450 x10*3/uL    Neutrophils % 89.0 40.0 - 80.0 %    Immature Granulocytes %, Automated 0.5 0.0 - 0.9 %    Lymphocytes % 5.9 13.0 - 44.0 %    Monocytes % 4.2 2.0 - 10.0 %    Eosinophils % 0.1 0.0 - 6.0 %    Basophils % 0.3 0.0 - 2.0 %    Neutrophils Absolute 8.65 (H) 1.60 - 5.50 x10*3/uL    Immature Granulocytes Absolute, Automated 0.05 0.00 - 0.50 x10*3/uL    Lymphocytes Absolute 0.57 (L) 0.80 - 3.00 x10*3/uL    Monocytes Absolute 0.41 0.05 - 0.80 x10*3/uL    Eosinophils Absolute 0.01 0.00 - 0.40 x10*3/uL    Basophils Absolute 0.03 0.00 - 0.10 x10*3/uL   Comprehensive metabolic panel    Collection Time: 11/26/23  3:35 PM   Result Value Ref Range    Glucose 125 (H) 74 - 99 mg/dL    Sodium 134 (L) 136 - 145 mmol/L    Potassium 4.2 3.5 - 5.3 mmol/L    Chloride 97 (L) 98 - 107 mmol/L    Bicarbonate 24 21 - 32 mmol/L    Anion Gap 17 10 - 20 mmol/L    Urea Nitrogen 27 (H) 6 - 23 mg/dL    Creatinine 1.13 (H) 0.50 - 1.05 mg/dL    eGFR 47 (L) >60 mL/min/1.73m*2    Calcium 8.9 8.6 - 10.3 mg/dL    Albumin 3.4 3.4 - 5.0 g/dL    Alkaline Phosphatase 92 33 - 136 U/L    Total Protein 6.1 (L) 6.4 - 8.2 g/dL    AST 34 9 - 39 U/L    Bilirubin, Total 1.4 (H) 0.0 - 1.2 mg/dL    ALT 15 7 - 45 U/L   Magnesium    Collection Time: 11/26/23  3:35  PM   Result Value Ref Range    Magnesium 1.56 (L) 1.60 - 2.40 mg/dL   Blood Gas Venous Full Panel    Collection Time: 11/26/23  3:35 PM   Result Value Ref Range    POCT pH, Venous 7.45 (H) 7.33 - 7.43 pH    POCT pCO2, Venous 41 41 - 51 mm Hg    POCT pO2, Venous 23 (L) 35 - 45 mm Hg    POCT SO2, Venous 13 (L) 45 - 75 %    POCT Oxy Hemoglobin, Venous 12.9 (L) 45.0 - 75.0 %    POCT Hematocrit Calculated, Venous 39.0 36.0 - 46.0 %    POCT Sodium, Venous 128 (L) 136 - 145 mmol/L    POCT Potassium, Venous 4.6 3.5 - 5.3 mmol/L    POCT Chloride, Venous 97 (L) 98 - 107 mmol/L    POCT Ionized Calicum, Venous 1.10 1.10 - 1.33 mmol/L    POCT Glucose, Venous 137 (H) 74 - 99 mg/dL    POCT Lactate, Venous 3.8 (H) 0.4 - 2.0 mmol/L    POCT Base Excess, Venous 4.1 (H) -2.0 - 3.0 mmol/L    POCT HCO3 Calculated, Venous 28.5 (H) 22.0 - 26.0 mmol/L    POCT Hemoglobin, Venous 13.1 12.0 - 16.0 g/dL    POCT Anion Gap, Venous 7.0 (L) 10.0 - 25.0 mmol/L    Patient Temperature 37.0 degrees Celsius    FiO2 21 %   Morphology    Collection Time: 11/26/23  3:35 PM   Result Value Ref Range    RBC Morphology See Below     Target Cells Few     Ovalocytes Few     Monae Cells Few     Vacuolated Neutrophils Present    Blood Gas Lactic Acid, Venous    Collection Time: 11/26/23  6:14 PM   Result Value Ref Range    POCT Lactate, Venous 3.3 (H) 0.4 - 2.0 mmol/L   Blood Culture    Collection Time: 11/26/23  7:16 PM    Specimen: Peripheral Venipuncture; Blood culture   Result Value Ref Range    Blood Culture No growth at 1 day    Blood Culture    Collection Time: 11/26/23  7:22 PM    Specimen: Peripheral Venipuncture; Blood culture   Result Value Ref Range    Blood Culture No growth at 1 day    CBC    Collection Time: 11/27/23  6:01 AM   Result Value Ref Range    WBC 8.5 4.4 - 11.3 x10*3/uL    nRBC 0.0 0.0 - 0.0 /100 WBCs    RBC 4.69 4.00 - 5.20 x10*6/uL    Hemoglobin 12.0 12.0 - 16.0 g/dL    Hematocrit 38.4 36.0 - 46.0 %    MCV 82 80 - 100 fL    MCH 25.6 (L)  26.0 - 34.0 pg    MCHC 31.3 (L) 32.0 - 36.0 g/dL    RDW 21.7 (H) 11.5 - 14.5 %    Platelets 202 150 - 450 x10*3/uL   Basic metabolic panel    Collection Time: 11/27/23  6:01 AM   Result Value Ref Range    Glucose 77 74 - 99 mg/dL    Sodium 135 (L) 136 - 145 mmol/L    Potassium 4.1 3.5 - 5.3 mmol/L    Chloride 96 (L) 98 - 107 mmol/L    Bicarbonate 27 21 - 32 mmol/L    Anion Gap 16 10 - 20 mmol/L    Urea Nitrogen 26 (H) 6 - 23 mg/dL    Creatinine 1.09 (H) 0.50 - 1.05 mg/dL    eGFR 50 (L) >60 mL/min/1.73m*2    Calcium 8.8 8.6 - 10.3 mg/dL   C-reactive protein    Collection Time: 11/27/23  6:01 AM   Result Value Ref Range    C-Reactive Protein 12.47 (H) <1.00 mg/dL   B-type natriuretic peptide    Collection Time: 11/27/23  6:01 AM   Result Value Ref Range    BNP 2,886 (H) 0 - 99 pg/mL   POCT GLUCOSE    Collection Time: 11/27/23  6:24 AM   Result Value Ref Range    POCT Glucose 73 (L) 74 - 99 mg/dL   Lactate    Collection Time: 11/27/23 10:27 AM   Result Value Ref Range    Lactate 3.1 (H) 0.4 - 2.0 mmol/L   POCT GLUCOSE    Collection Time: 11/27/23 10:58 AM   Result Value Ref Range    POCT Glucose 69 (L) 74 - 99 mg/dL   Lactate    Collection Time: 11/27/23  1:55 PM   Result Value Ref Range    Lactate 4.7 (HH) 0.4 - 2.0 mmol/L   Sedimentation rate, automated    Collection Time: 11/27/23  1:55 PM   Result Value Ref Range    Sedimentation Rate 5 0 - 30 mm/h   Blood Culture    Collection Time: 11/27/23  4:39 PM    Specimen: Peripheral Venipuncture; Blood culture   Result Value Ref Range    Blood Culture Loaded on Instrument - Culture in progress    Blood Culture    Collection Time: 11/27/23  4:39 PM    Specimen: Peripheral Venipuncture; Blood culture   Result Value Ref Range    Blood Culture Loaded on Instrument - Culture in progress    Vancomycin, Trough    Collection Time: 11/28/23 12:08 AM   Result Value Ref Range    Vancomycin, Trough 31.1 (HH) 5.0 - 20.0 ug/mL   CBC and Auto Differential    Collection Time: 11/28/23  12:08 AM   Result Value Ref Range    WBC 7.3 4.4 - 11.3 x10*3/uL    nRBC 0.0 0.0 - 0.0 /100 WBCs    RBC 3.81 (L) 4.00 - 5.20 x10*6/uL    Hemoglobin 9.7 (L) 12.0 - 16.0 g/dL    Hematocrit 31.2 (L) 36.0 - 46.0 %    MCV 82 80 - 100 fL    MCH 25.5 (L) 26.0 - 34.0 pg    MCHC 31.1 (L) 32.0 - 36.0 g/dL    RDW 21.2 (H) 11.5 - 14.5 %    Platelets 151 150 - 450 x10*3/uL    Neutrophils % 79.5 40.0 - 80.0 %    Immature Granulocytes %, Automated 0.5 0.0 - 0.9 %    Lymphocytes % 12.5 13.0 - 44.0 %    Monocytes % 6.7 2.0 - 10.0 %    Eosinophils % 0.4 0.0 - 6.0 %    Basophils % 0.4 0.0 - 2.0 %    Neutrophils Absolute 5.78 (H) 1.60 - 5.50 x10*3/uL    Immature Granulocytes Absolute, Automated 0.04 0.00 - 0.50 x10*3/uL    Lymphocytes Absolute 0.91 0.80 - 3.00 x10*3/uL    Monocytes Absolute 0.49 0.05 - 0.80 x10*3/uL    Eosinophils Absolute 0.03 0.00 - 0.40 x10*3/uL    Basophils Absolute 0.03 0.00 - 0.10 x10*3/uL   Magnesium    Collection Time: 11/28/23 12:08 AM   Result Value Ref Range    Magnesium 1.46 (L) 1.60 - 2.40 mg/dL   Comprehensive metabolic panel    Collection Time: 11/28/23 12:08 AM   Result Value Ref Range    Glucose 76 74 - 99 mg/dL    Sodium 131 (L) 136 - 145 mmol/L    Potassium 3.6 3.5 - 5.3 mmol/L    Chloride 96 (L) 98 - 107 mmol/L    Bicarbonate 23 21 - 32 mmol/L    Anion Gap 16 10 - 20 mmol/L    Urea Nitrogen 27 (H) 6 - 23 mg/dL    Creatinine 1.21 (H) 0.50 - 1.05 mg/dL    eGFR 44 (L) >60 mL/min/1.73m*2    Calcium 8.1 (L) 8.6 - 10.3 mg/dL    Albumin 3.1 (L) 3.4 - 5.0 g/dL    Alkaline Phosphatase 59 33 - 136 U/L    Total Protein 4.8 (L) 6.4 - 8.2 g/dL    AST 18 9 - 39 U/L    Bilirubin, Total 1.5 (H) 0.0 - 1.2 mg/dL    ALT 12 7 - 45 U/L   Phosphorus    Collection Time: 11/28/23 12:08 AM   Result Value Ref Range    Phosphorus 4.1 2.5 - 4.9 mg/dL   BLOOD GAS VENOUS    Collection Time: 11/28/23 12:08 AM   Result Value Ref Range    POCT pH, Venous 7.42 7.33 - 7.43 pH    POCT pCO2, Venous 36 (L) 41 - 51 mm Hg    POCT pO2,  Venous 33 (L) 35 - 45 mm Hg    POCT SO2, Venous 37 (L) 45 - 75 %    POCT Oxy Hemoglobin, Venous 36.3 (L) 45.0 - 75.0 %    POCT Base Excess, Venous -0.7 -2.0 - 3.0 mmol/L    POCT HCO3 Calculated, Venous 23.4 22.0 - 26.0 mmol/L    Patient Temperature 37.0 degrees Celsius    FiO2 21 %    Test Comment [facility] 1-S    Lactate    Collection Time: 11/28/23 12:08 AM   Result Value Ref Range    Lactate 3.3 (H) 0.4 - 2.0 mmol/L   Morphology    Collection Time: 11/28/23 12:08 AM   Result Value Ref Range    RBC Morphology See Below     Hypochromia Mild     Ovalocytes Few    CARRIE without exercise    Collection Time: 11/28/23  7:45 AM   Result Value Ref Range    BSA 1.66 m2       CV Studies:  EKG:No results found for this or any previous visit (from the past 4464 hour(s)).  Echocardiogram:   [place] [address]  Phone [phone] Fax [phone]    TRANSTHORACIC ECHOCARDIOGRAM REPORT      Patient Name:     SHAZIA Che Physician:  [id number] Nigel Schmid DO  Study Date:       11/15/2022     Referring           [facility]  Physician:          MANJIT  MRN/PID:          [id number]       PCP:  Accession/Order#: [id number]   [facility] Echo Lab  Location:  YOB: 1935     Fellow:  Gender:           F              Nurse:  Admit Date:       11/15/2022     Sonographer:        Kinga Bennett [facility]  Admission Status: Outpatient     Additional Staff:  Height:           157.48 cm      CC Report to:  Weight:           57.15 kg       Study Type:         Echocardiogram  BSA:              1.57 m2  Blood Pressure: 149 /54 mmHg    Diagnosis/ICD: I42.0-Dilated cardiomyopathy; I50.42-Chronic combined systolic  (congestive) and diastolic (congestive) heart failure (CHF)  Indication:    Congestive Heart Failure, non-ischemic cardiomyopathy  Procedure/CPT: Echo Complete w Full [id number]  Study Detail: The following Echo  studies were performed: 2D, M-Mode, Doppler and  color flow.      PHYSICIAN INTERPRETATION:  Left Ventricle: Left ventricular systolic function is low normal, with an estimated ejection fraction of 50-55%. There are no regional wall motion abnormalities. The left ventricular cavity size is normal. Spectral Doppler shows an impaired relaxation pattern of left ventricular diastolic filling.  Left Atrium: The left atrium is normal in size.  Right Ventricle: The right ventricle is normal in size. There is normal right ventricular global systolic function.  Right Atrium: The right atrium is normal in size.  Aortic Valve: The aortic valve is trileaflet. There is mild aortic valve cusp calcification. There is no evidence of aortic valve stenosis.  There is mild to moderate aortic valve regurgitation. The mean gradient of the aortic valve is 3.0 mmHg.  Mitral Valve: The mitral valve is normal in structure. There is mild mitral annular calcification. There is mild mitral valve regurgitation.  Tricuspid Valve: The tricuspid valve is structurally normal. There is moderate to severe tricuspid regurgitation. The Doppler estimated RVSP is moderate to severely elevated at 77.6 mmHg.  Pulmonic Valve: The pulmonic valve is structurally normal. There is trace to mild pulmonic valve regurgitation.  Pericardium: There is no pericardial effusion noted.  Aorta: The aortic root is normal.  Systemic Veins: The inferior vena cava appears to be of normal size. There is IVC inspiratory collapse greater than 50%.      CONCLUSIONS:  1. Left ventricular systolic function is low normal with a 50-55% estimated ejection fraction.  2. Spectral Doppler shows an impaired relaxation pattern of left ventricular diastolic filling.  3. Moderate to severe tricuspid regurgitation.  4. Moderate to severely elevated right ventricular systolic pressure.  5. Aortic valve stenosis is not present.  6. Mild to moderate aortic valve regurgitation.    QUANTITATIVE  DATA SUMMARY:  2D MEASUREMENTS:  Normal Ranges:  IVSd:          1.10 cm   (0.6-1.1cm)  LVPWd:         1.00 cm   (0.6-1.1cm)  LVIDd:         4.20 cm   (3.9-5.9cm)  LVIDs:         3.10 cm  LV Mass Index: 93.6 g/m2  LV % FS        26.2 %    LA VOLUME:  Normal Ranges:  LA Vol A4C:        46.1 ml    (22+/-6mL/m2)  LA Vol A2C:        46.3 ml  LA Vol BP:         46.7 ml  LA Vol Index A4C:  29.4ml/m2  LA Vol Index A2C:  29.5 ml/m2  LA Vol Index BP:   29.7 ml/m2  LA Area A4C:       16.8 cm2  LA Area A2C:       17.0 cm2  LA Major Axis A4C: 5.2 cm  LA Major Axis A2C: 5.3 cm  LA Volume Index:   29.1 ml/m2    RA VOLUME BY A/L METHOD:  Normal Ranges:  RA Area A4C: 16.2 cm2    M-MODE MEASUREMENTS:  Normal Ranges:  Ao Root: 3.30 cm (2.0-3.7cm)  AoV Exc: 1.90 cm (1.5-2.5cm)  LAs:     3.90 cm (2.7-4.0cm)    AORTA MEASUREMENTS:  Normal Ranges:  AoV Exc:     1.90 cm (1.5-2.5cm)  Ao Sinus, d: 3.10 cm (2.1-3.5cm)  Ao STJ, d:   2.50 cm (1.7-3.4cm)  Asc Ao, d:   3.20 cm (2.1-3.4cm)    LV SYSTOLIC FUNCTION BY 2D PLANIMETRY (MOD):  Normal Ranges:  EF-A4C View: 55.7 % (>=55%)  EF-A2C View: 51.8 %  EF-Biplane:  54.0 %    LV DIASTOLIC FUNCTION:  Normal Ranges:  MV Peak E:    0.64 m/s    (0.7-1.2 m/s)  MV Peak A:    0.79 m/s    (0.42-0.7 m/s)  E/A Ratio:    0.82        (1.0-2.2)  MV e'         0.06 m/s    (>8.0)  MV lateral e' 0.06 m/s  MV medial e'  0.05 m/s  MV A Dur:     148.00 msec  E/e' Ratio:   10.22       (<8.0)  LV IVRT:      114 msec    (<100ms)    MITRAL VALVE:  Normal Ranges:  MV DT: 204 msec (150-240msec)    MITRAL INSUFFICIENCY:  Normal Ranges:  MR VTI:  206.00 cm  MR Vmax: 573.00 cm/s    AORTIC VALVE:  Normal Ranges:  AoV Mean PG:             3.0 mmHg (1.7-11.5mmHg)  LVOT Max Juan:            0.96 m/s (<=1.1m/s)  AoV VTI:                 25.70 cm (18-25cm)  LVOT VTI:                21.60 cm  LVOT Diameter:           2.00 cm  (1.8-2.4cm)  AoV Area, VTI:           2.64 cm2 (2.5-5.5cm2)  AoV Dimensionless Index: 0.84    AORTIC  INSUFFICIENCY:  AI Vmax:      3.97 m/s  AI Half-time: 494 msec    RIGHT VENTRICLE:  RV 1   3.2 cm  RV 2   2.6 cm  RV 3   4.5 cm  TAPSE: 24.0 mm  RV s'  0.15 m/s    TRICUSPID VALVE/RVSP:  Normal Ranges:  Peak TR Velocity: 4.32 m/s  RV Syst Pressure: 77.6 mmHg (< 30mmHg)  IVC Diam:         1.30 cm      [id number] Prateek Schmid DO  Electronically signed on 11/15/2022 at 12:22:40 PM         Final     Stress Testing CHERIE([id number]): No results found for this or any previous visit from the past 1825 days.    Cardiac Catheterization:   Adult Cath     [facility], Cath Lab  [address] [place] [address]  Phone [phone] Fax [phone]    Cardiovascular Catheterization Report    Patient Name:     SHAZIA FAJARDO Performing Physician: [id number] Jacinto MD  Study Date:       8/24/2022      Verifying Physician:  Natalie Jacinto MD  MRN/PID:          [id number]       Cardiologist:  Accession/Order#: [id number]      Referring Physician:  [id number] PRATEEK SCHMID  YOB: 1935     Referring Physician:  Gender:           F              Referring Physician:      Study: Left and Right Heart Catheterization      Indications:  SHAZIA FAJARDO is a 87 year old female who presents with atrial fibrillation. Left ventricular dysfunction, with an asymptomatic chest pain assessment. Shortness of breath, systolic dysfunction. Study performed as an urgent cath procedure. Heart failure.    Medical History:  Stress test performed: Yes. CTA performed: Yes. Agatston accessed: No. LVEF Assessed: Yes. LVEF = 20%.    Procedure Description:  After infiltration with 2% Lidocaine, the right radial artery was cannulated with a modified Seldinger technique. Subsequently a 6 Hebrew sheath was placed retrograde in the right radial artery. The arterial sheath was sized up to 6 Hebrew. After infiltration of local anesthetic, the right cephalic vein vein was cannulated with a percutaneous technique. A 5 Hebrew  sheath was placed in the vein. Multiple injections of contrast were made into the left and right coronary arteries with angiograms recorded in multiple projections. Cardiac output was calculated via the Sonya method. After completion of the procedure, the arterial sheath was pulled and a TR Band Radial Compression Device was utilized to obtain patent hemostasis. Post-procedure, the venous sheath was pulled and pressure was applied to the site.    Coronary Angiography:  The coronary circulation is right dominant.    Left Main Coronary Artery:  The left main coronary artery is a normal caliber vessel. The left main arises normally from the left coronary sinus of Valsalva and bifurcates into the LAD and circumflex coronary arteries. The left main coronary artery showed no significant disease or stenosis greater than 30%.    Left Anterior Descending Coronary Artery Distribution:  The left anterior descending coronary artery is a normal caliber vessel. The LAD arises normally from the left main coronary artery. The LAD demonstrated diffuse mild calcification and mild proximal to mid obstruction.    Circumflex Coronary Artery Distribution:  The circumflex coronary artery is a normal caliber vessel. The circumflex arises normally from the left main coronary artery and terminates in the AV groove. The circumflex revealed mild ostial to proximal obstruction.    Right Heart Catheterization:  Cardiac output was calculated via the Sonya method.    LVEDP 20 mmHg, no pullback gradient across LV-Ao  Ao (103 mmHg) 95%  RA 15 mmHg, 50%  RV 50/15 mmHg  PA 50/25 mmHg, 33.3 mmHg, 47%  PCWP Could not wedge the catehter  SVR 2427 cgs  CO 2.9 L/min, CI 1.7 L/min/m2.    Right Coronary Artery Distribution:    The right coronary artery is a normal caliber vessel. The RCA arises normally from the right sinus of Valsalva. The RCA showed no significant disease or stenosis greater than 30% and diffuse mild calcification.    Hemo  Personnel:  +--------------------+-------------+  Name                Duty           +--------------------+-------------+  Gen Jacinto MD        PROC MD 1  +--------------------+-------------+  Jona Jacinto RT   PROC SCRUB 1  +--------------------+-------------+  Zabrina Scruggs RN  PROC CIRC 1  +--------------------+-------------+  Brielle Wallace RN    PROC RECORD 1  +--------------------+-------------+  Ashlee Davis RN     PROC RECORD 2  +--------------------+-------------+  Elio Macedo RN      PROC NURSE 1  +--------------------+-------------+  Angeles Gutierrez RN  PROC NURSE 2  +--------------------+-------------+      Sedation Time:  +------------------------+------------------+  Sedation Start/End TimesTime                +------------------------+------------------+  End                     8/24/2022 13:41:16  +------------------------+------------------+      Equipment Used:  +---------------------+--------------------------------------------------------+              Date/Time                                             Description  +---------------------+--------------------------------------------------------+  8/24/2022 12:31:37 PM     - Capnoflex LF Oral/Nasal CO2 - Qty: 1                                                               Each Part #: [id number]  +---------------------+--------------------------------------------------------+  8/24/2022 12:31:42 PM    Terumo TR Band Regular - Qty: 1 Each                                                                  Part #: [id number]  +---------------------+--------------------------------------------------------+  8/24/2022 12:32:33 PM    [employer] Guidewire 3mm J                                          210 cm .035 - Qty: 1 Each Part #: 508  +---------------------+--------------------------------------------------------+  8/24/2022 12:32:40 PM    - Erwin Zhang 6 FR x 10cm -               "                                           Qty: 1 Each Part #: 13  +---------------------+--------------------------------------------------------+  8/24/2022 12:43:19 PM Terumo Runthrough NS Extra Floppy                                                180cm - Qty: 1 Each Part #: [id number]  +---------------------+--------------------------------------------------------+  8/24/2022 12:43:41 PM  Terumo TERUMO Erwin Slender 5Fr                                10cm (B-4760) - Qty: 1 Each Part #: [id number]  +---------------------+--------------------------------------------------------+  8/24/2022 12:44:00 PM      Arrow Arrow 5 FR 110cm Balloon                              Wedge Pressure Catheter - Qty: 1 Each Part #: [id number]  +---------------------+--------------------------------------------------------+  8/24/2022 12:51:15 PM                   200 300cm wire  +---------------------+--------------------------------------------------------+  8/24/2022 12:54:17 PM   Navicross 0.035 135cm microcatheter  +---------------------+--------------------------------------------------------+  8/24/2022 12:58:46 PM  Abbott Spartacore 0.014\" x 300cm                                            Guidewire - Qty: 1 Each Part #: 604  +---------------------+--------------------------------------------------------+   8/24/2022 1:05:28 PM    [employer]ily[employer] Scientific V-18 Control                                  Wire Straight 300cm - Qty: 1 Each Part #: 577  +---------------------+--------------------------------------------------------+   8/24/2022 1:22:46 PM  Terumo TERUMO Glidesheath Slender 5Fr                                10cm (C-1355) - Qty: 1 Each Part #: [id number]  +---------------------+--------------------------------------------------------+   8/24/2022 1:24:59 PM  Terumo TERUMO Glidesheath Slender 5Fr                                10cm (C-1894) - Qty: 1 Each Part #: " [id number]  +---------------------+--------------------------------------------------------+   8/24/2022 1:26:25 PM Medtronic DXTERITY ANG PIGTAIL 5FR X                                                145cm - Qty: 1 Each Part #: 655  +---------------------+--------------------------------------------------------+   8/24/2022 1:26:36 PM      Medtronic DXTERITY JR 4.0 5FR X                                           100CM - Qty: 1 Each Part #: [id number]  +---------------------+--------------------------------------------------------+   8/24/2022 1:26:52 PM              DXTERITY JL 3.5 5 [place]  +---------------------+--------------------------------------------------------+   8/24/2022 1:27:21 PM  [employer] Guidewire 1.5mm J                                           210cm .035 - Qty: 1 Each Part #: [id number]  +---------------------+--------------------------------------------------------+   8/24/2022 1:28:53 PM Medtronic DXTERITY ANG PIGTAIL 5FR X                                                145cm - Qty: 1 Each Part #: 655  +---------------------+--------------------------------------------------------+      Fluoroscopy Time:  +--------------------------+---------+  X-Ray Summary Fluoro Time:17.30 min  +--------------------------+---------+      +----------+--------+  Contrast: Dose:     +----------+--------+  Omnipaque:60.00 ml  +----------+--------+      Hemodynamic Pressures:    +----+----------+---------+-------------+-------------+---+----+-------+-------+  SiteDate Time   Phase  Systolic mmHg  Diastolic  ED MeanA-Wave V-Wave                   Name                    mmHg     mmHmmHg mmHg   mmHg                                                      g                     +----+----------+---------+-------------+-------------+---+----+-------+-------+    PA 8/24/2022 [facility]           43           21     29                    1:08:08  PM                                                          +----+----------+---------+-------------+-------------+---+----+-------+-------+    RV 8/24/2022 [facility]           41            6 14                        1:09:09 PM                                                          +----+----------+---------+-------------+-------------+---+----+-------+-------+    RA 8/24/2022 [facility]                               15     18     21      1:09:32 PM                                                          +----+----------+---------+-------------+-------------+---+----+-------+-------+    LV 8/24/2022 [facility]          142            7 21                        1:31:24 PM                                                          +----+----------+---------+-------------+-------------+---+----+-------+-------+   LVp 8/24/2022 [facility]          144            6 19                        1:31:33 PM                                                          +----+----------+---------+-------------+-------------+---+----+-------+-------+   AOp 8/24/2022 [facility]          145           75    103                    1:31:40 PM                                                          +----+----------+---------+-------------+-------------+---+----+-------+-------+    AO 8/24/2022 [facility]          146           71    102                    1:33:32 PM                                                          +----+----------+---------+-------------+-------------+---+----+-------+-------+        Oxygen Saturation %:  +-----------+----------+------------+  Sample SiteO2 Sat (%)HB (g/100ml)  +-----------+----------+------------+           AO        95        12.7  +-----------+----------+------------+           RA        50        12.7  +-----------+----------+------------+           AO         95        12.7  +-----------+----------+------------+           PA        47        12.7  +-----------+----------+------------+      Cardiac Outputs:  +---------------+------------------+-------+  KEHINDE CO (l/min)KEHINDE CI (l/min/m2)KEHINDE SV  +---------------+------------------+-------+              2.9               1.7   38.0  +---------------+------------------+-------+      Vascular Resistance Calculated Values (Wood Units):  +-----+----+----+----+----+----+----+-------+  PhaseSVR SVRITPR TPRITVR TVRITPR/TVR  +-----+----+----+----+----+----+----+-------+  0    [id number]        +-----+----+----+----+----+----+----+-------+      Complications:  No in-lab complications observed.    Cardiac Cath Transition of Care Summary:  Post Procedure Diagnosis: Non-ischemic cardiomyopathy.  Blood Loss:               Estimated blood loss during the procedure was 20 mls.  Specimens Removed:        Number of specimen(s) removed: none.    ____________________________________________________________________________________  CONCLUSIONS:  1. Non-ischemic cardiomyopathy.  2. Continue guideline directed medical therapy for severe chronic left ventricular systolic dysfunction.    ____________________________________________________________________________________  CPT Codes:  Right & Left Heart Cath w/ventriculography/Coronary angio ([facility])([facility])-64791    ICD 10 Codes:  I50.21-Acute systolic (congestive) heart failure    [id number] Gen Jacinto MD  Performing Physician  Electronically signed by [id number] Gen Jacinto MD on 9/11/2022 at 6:45:44 PM      cc Report to: [id number] PRATEEK SINGH           Final   No results found for this or any previous visit from the past 3650 days.     Cardiac Scoring: No results found for this or any previous visit from the past 1825 days.    AAA : No results found for this or any previous visit from the past 1825 days.    OTHER: No results found for this or any previous visit  from the past 1825 days.    LAST IMAGING RESULTS  CARRIE without exercise  Preliminary Cardiology Report                   [place] [id number]       Phone [phone] Fax [phone]                 Preliminary Vascular Lab Report     [facility] ANKLE BRACHIAL INDEX (CARRIE) WITHOUT EXERCISE       Patient Name:     SHAZIA FAJARDO Yane Physician:  [id number] Maribell Rao MD  Study Date:       11/28/2023     Ordering Provider:  [id number] KEON DELATORRE  MRN/PID:          [id number]       Fellow:  Accession#:       [id number]   Technologist:       Yasmin Alamo RVT  YOB: 1935     Technologist 2:  Gender:           F              Encounter#:         [id number]  Admission Status: Inpatient      Location Performed: [facility]       Diagnosis/ICD: Peripheral vascular disease, unspecified-I73.9       PRELIMINARY CONCLUSIONS:     Right Lower PVR: No evidence of arterial occlusive disease in the right lower extremity at rest. Triphasic flow is noted in the right common femoral artery, right posterior tibial artery and right dorsalis pedis artery.  Left Lower PVR: No evidence of arterial occlusive disease in the left lower extremity at rest. Biphasic flow is noted in the left posterior tibial artery. Triphasic flow is noted in the left common femoral artery and left dorsalis pedis artery.  Additional Findings:  Technically limited due to movement, cooperation, pain tolerance to cuffs.       Imaging & Doppler Findings:     RIGHT Lower PVR                Pressures Ratios  Right Posterior Tibial (Ankle) 108 mmHg  1.11  Right Dorsalis Pedis (Ankle)   104 mmHg  1.07  Right Digit (Great Toe)        65 mmHg   0.67          LEFT Lower PVR                Pressures Ratios  Left Posterior Tibial (Ankle) 109 mmHg  1.12  Left Dorsalis Pedis (Ankle)   111 mmHg  1.14  Left Digit (Great Toe)        45 mmHg   0.46                             Left  Brachial Pressure 97  mmHg          VASCULAR PRELIMINARY REPORT  completed by Yasmin Alamo RVT on 11/28/2023 at 7:44:46 AM       ** Final **  XR chest 1 view  Narrative: Interpreted By:  Alfredito Rivera,   STUDY:  XR CHEST 1 VIEW;  11/27/2023 11:55 pm      INDICATION:  Signs/Symptoms:CHF.      COMPARISON:  08/21/2022      ACCESSION NUMBER(S):  [id number]      ORDERING CLINICIAN:  KEON DELATORRE      FINDINGS:  The heart is enlarged The pulmonary vasculature is congested  centrally and indistinct peripherally, with interlobular septal  thickening and indistinct mid to lower lung consolidative opacities  consistent with pulmonary edema. There are large bilateral pleural  effusions.      Impression: Severe pulmonary edema and large bilateral pleural effusions.          MACRO:  None.      Signed by: Alfredito Rivera 11/28/2023 2:16 AM  Dictation workstation:   [id number]        Nigel Schmid DO, FACC, FACOI  11/28/2023  8:38 AM

## 2023-11-28 NOTE — CONSULTS
Critical Care Medicine Consult      Reason For Consult  Shock requiring vasopressors    History Of Present Illness  Patricia Jewell is a 88 y.o. female with CHF, atrial fibrillation on Eliquis, hypertension, history of non-Hodgkin's lymphoma in remission since 2005, history of right lower extremity blood clot s/p IVC filter was transferred to the ICU because of shock requiring vasopressors.  This morning when I I was consulted she was on phenylephrine.  I changed from phenylephrine to Levophed to avoid hypervolemia and congestive heart failure exacerbation patient.  Placed a central line.  Patient is alert oriented x 2, does not have a capacity to make medical decisions.  I had extensive discussion with the patient daughter and grandson who are at bedside.  As per the patient daughter, grandson is a medical power of  for the patient.     On admission 11/27/2023: She presented to the emergency department 11/26/2023 with complaints of erythema, swelling, purulence from left lower extremity wound.  Patient's daughter at bedside to assist with history as patient is slightly confused (apparently does have some mild memory issues at baseline per daughter).  Apparently patient has had a wound on her left lower extremity since approximately mid October.  She has been seen for this wound multiple times by both emergency department and PCP, has completed 2 rounds of Keflex prior to this.  Daughter at bedside reports they had been doing local wound care at home with Epsom salt soaks and Neosporin however due to the swelling in the legs it appears she had a wound open on the top of her foot sometime in the last 1 month.  Daughter reports the erythema began to worsen and pain worsened which prompted their visit to the emergency department yesterday.  She denies any fevers or chills though states she is cold all the time.  Denies any lightheadedness or weakness.  Daughter does report her mental status is slightly worse  than baseline.  She denies any chest pain, shortness of breath, nausea, vomiting, diarrhea, urinary symptoms.     In the ED, vitals: Afebrile, , RR 18, /85, 93% on room air.  BMP with slight hyponatremia 134.  Creatinine is elevated 1.13 (approximately her baseline).  Magnesium low 1.56.  CBC without leukocytosis.  Blood culture obtained.  VBG with pH 7.45, PCO2 41.  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  Lactate 3.8-> 3.3.  Patient given broad-spectrum antibiotics and admitted to medicine for further management.    Past Medical History:   Diagnosis Date    Fracture of left shoulder girdle, part unspecified, subsequent encounter for fracture with delayed healing     Closed fracture of left shoulder with delayed healing, subsequent encounter    History of falling     History of fall    Personal history of non-Hodgkin lymphomas     History of non-Hodgkin's lymphoma    Personal history of other venous thrombosis and embolism     History of DVT (deep vein thrombosis)    Personal history of pulmonary embolism     History of pulmonary embolism     Past Surgical History:   Procedure Laterality Date    CATARACT EXTRACTION  10/10/2018    Cataract Surgery    CT LOWER EXTREMITY LEFT ANGIOGRAM W AND/OR WO IV CONTRAST Left 11/26/2023    CT LOWER EXTREMITY LEFT ANGIOGRAM W AND/OR WO IV CONTRAST 11/26/2023 [facility] CT    HERNIA REPAIR  10/05/2016    Inguinal Hernia Repair    HYSTERECTOMY  09/14/2016    Hysterectomy    OTHER SURGICAL HISTORY  09/14/2016    Thoracentesis (Therapeutic)    OTHER SURGICAL HISTORY  09/14/2016    Interruption Inferior Vena Cava [place] Filter Placement    XR CHEST PACEMAKER WITH FLUORO  3/16/2023    XR CHEST PACEMAKER WITH FLUORO 3/16/2023 [facility]     Medications Prior  to Admission   Medication Sig Dispense Refill Last Dose    acetaminophen (Tylenol) 325 mg tablet Take 1 tablet (325 mg) by mouth every 6 hours if needed for mild pain (1 - 3).   Unknown    amiodarone (Pacerone) 200 mg tablet Take 1 tablet (200 mg) by mouth once daily.   Unknown    carvedilol (Coreg) 12.5 mg tablet Take 1.5 tablets (18.75 mg) by mouth 2 times a day.   Unknown    Eliquis 2.5 mg tablet Take 1 tablet (2.5 mg) by mouth 2 times a day. 180 tablet 1 Unknown    Farxiga 10 mg Take 1 tablet (10 mg) by mouth once daily in the morning. Take before meals.   Unknown    furosemide (Lasix) 40 mg tablet Take 1 tablet (40 mg) by mouth once daily.   Unknown    magnesium, as gluconate, (Magonate) 27 mg magnesium (500 mg) tablet Take 1 tablet (27 mg) by mouth once daily. (Patient not taking: Reported on 2023) 60 tablet 11 Not Taking    potassium chloride CR 20 mEq ER tablet Take 1 tablet (20 mEq) by mouth once daily. Do not crush or chew.   Not Taking    sacubitriL-valsartan (Entresto) 49-51 mg tablet Take 1 tablet by mouth 2 times a day. 180 tablet 3 Unknown    spironolactone (Aldactone) 25 mg tablet Take 1 tablet (25 mg) by mouth once daily. 90 tablet 1 Unknown     Patient has no known allergies.  Social History     Tobacco Use    Smoking status: Former     Packs/day: 0.50     Years: 20.00     Additional pack years: 0.00     Total pack years: 10.00     Types: Cigarettes     Quit date:      Years since quittin.9     Passive exposure: Past    Smokeless tobacco: Never   Vaping Use    Vaping Use: Never used   Substance Use Topics    Alcohol use: Not Currently    Drug use: Not Currently     Family History   Problem Relation Name Age of Onset    Hypertension Mother      Hyperlipidemia Mother      Hypertension Father      Hyperlipidemia Father      Esophageal cancer Brother         Scheduled Medications:   amiodarone, 200 mg, oral, Daily  apixaban, 2.5 mg, oral, BID  [Held by provider] carvedilol, 6.25 mg,  oral, BID  magnesium oxide, 400 mg, oral, Daily  meropenem, 2 g, intravenous, q12h  mirtazapine, 7.5 mg, oral, Nightly  polyethylene glycol, 17 g, oral, Daily  vancomycin, 750 mg, intravenous, q24h         Continuous Medications:   [Held by provider] furosemide, 5 mg/hr  norepinephrine in sodium chloride 0.9 %, 0.05-1 mcg/kg/min, Last Rate: 0.06 mcg/kg/min (11/28/23 1245)         PRN Medications:   PRN medications: acetaminophen **OR** acetaminophen **OR** acetaminophen, ondansetron **OR** ondansetron    Review of Systems:  Review of Systems   Reason unable to perform ROS: Altered mental status.        Objective   Vitals:  Most Recent:  Vitals:    11/28/23 1245   BP: 102/72   Pulse: 101   Resp: 24   Temp:    SpO2: 100%       24hr Min/Max:  Temp  Min: 35.9 °C (96.6 °F)  Max: 36.5 °C (97.7 °F)  Pulse  Min: 93  Max: 105  BP  Min: 76/39  Max: 106/66  Resp  Min: 11  Max: 37  SpO2  Min: 94 %  Max: 100 %    LDA:   CVC 11/28/23 Triple lumen Non-tunneled Right Internal jugular (Active)   Placement Date/Time: 11/28/23 1030   Hand Hygiene Performed Prior to CVC Insertion: Yes  Site Prep: Chlorhexidine ;Usual sterile procedure followed  Site Prep Agent has Completely Dried Before Insertion: Yes  Medical Reason for Not Performing Maximal ...   Number of days: 0       Urethral Catheter Straight-tip 16 Fr. (Active)   Placement Date/Time: 11/28/23 1230   Placed by: RN  Hand Hygiene Completed: Yes  Catheter Type: Straight-tip  Tube Size (Fr.): 16 Fr.  Urine Returned: Yes   Number of days: 0         Vent settings:       Hemodynamic parameters for last 24 hours:         Intake/Output Summary (Last 24 hours) at 11/28/2023 1427  Last data filed at 11/28/2023 1240  Gross per 24 hour   Intake 733.94 ml   Output 400 ml   Net 333.94 ml           Physical exam:    Physical Exam  Vitals reviewed.   Constitutional:       Appearance: She is ill-appearing.   HENT:      Head: Normocephalic and atraumatic.   Eyes:      Conjunctiva/sclera:  Conjunctivae normal.      Pupils: Pupils are equal, round, and reactive to light.   Cardiovascular:      Rate and Rhythm: Normal rate. Rhythm irregular.   Pulmonary:      Effort: Pulmonary effort is normal.      Breath sounds: Rales present.   Abdominal:      General: Abdomen is flat.      Palpations: Abdomen is soft.   Musculoskeletal:      Comments: Generalized weakness   Skin:     General: Skin is warm and dry.   Neurological:      Mental Status: She is disoriented.   Psychiatric:      Comments: Lethargic          Lab/Radiology/Diagnostic Review:  Results for orders placed or performed during the hospital encounter of 11/26/23 (from the past 24 hour(s))   Blood Culture    Specimen: Peripheral Venipuncture; Blood culture   Result Value Ref Range    Blood Culture Loaded on Instrument - Culture in progress    Blood Culture    Specimen: Peripheral Venipuncture; Blood culture   Result Value Ref Range    Blood Culture Loaded on Instrument - Culture in progress    Vancomycin, Trough   Result Value Ref Range    Vancomycin, Trough 31.1 (HH) 5.0 - 20.0 ug/mL   CBC and Auto Differential   Result Value Ref Range    WBC 7.3 4.4 - 11.3 x10*3/uL    nRBC 0.0 0.0 - 0.0 /100 WBCs    RBC 3.81 (L) 4.00 - 5.20 x10*6/uL    Hemoglobin 9.7 (L) 12.0 - 16.0 g/dL    Hematocrit 31.2 (L) 36.0 - 46.0 %    MCV 82 80 - 100 fL    MCH 25.5 (L) 26.0 - 34.0 pg    MCHC 31.1 (L) 32.0 - 36.0 g/dL    RDW 21.2 (H) 11.5 - 14.5 %    Platelets 151 150 - 450 x10*3/uL    Neutrophils % 79.5 40.0 - 80.0 %    Immature Granulocytes %, Automated 0.5 0.0 - 0.9 %    Lymphocytes % 12.5 13.0 - 44.0 %    Monocytes % 6.7 2.0 - 10.0 %    Eosinophils % 0.4 0.0 - 6.0 %    Basophils % 0.4 0.0 - 2.0 %    Neutrophils Absolute 5.78 (H) 1.60 - 5.50 x10*3/uL    Immature Granulocytes Absolute, Automated 0.04 0.00 - 0.50 x10*3/uL    Lymphocytes Absolute 0.91 0.80 - 3.00 x10*3/uL    Monocytes Absolute 0.49 0.05 - 0.80 x10*3/uL    Eosinophils Absolute 0.03 0.00 - 0.40 x10*3/uL     Basophils Absolute 0.03 0.00 - 0.10 x10*3/uL   Magnesium   Result Value Ref Range    Magnesium 1.46 (L) 1.60 - 2.40 mg/dL   Comprehensive metabolic panel   Result Value Ref Range    Glucose 76 74 - 99 mg/dL    Sodium 131 (L) 136 - 145 mmol/L    Potassium 3.6 3.5 - 5.3 mmol/L    Chloride 96 (L) 98 - 107 mmol/L    Bicarbonate 23 21 - 32 mmol/L    Anion Gap 16 10 - 20 mmol/L    Urea Nitrogen 27 (H) 6 - 23 mg/dL    Creatinine 1.21 (H) 0.50 - 1.05 mg/dL    eGFR 44 (L) >60 mL/min/1.73m*2    Calcium 8.1 (L) 8.6 - 10.3 mg/dL    Albumin 3.1 (L) 3.4 - 5.0 g/dL    Alkaline Phosphatase 59 33 - 136 U/L    Total Protein 4.8 (L) 6.4 - 8.2 g/dL    AST 18 9 - 39 U/L    Bilirubin, Total 1.5 (H) 0.0 - 1.2 mg/dL    ALT 12 7 - 45 U/L   Phosphorus   Result Value Ref Range    Phosphorus 4.1 2.5 - 4.9 mg/dL   BLOOD GAS VENOUS   Result Value Ref Range    POCT pH, Venous 7.42 7.33 - 7.43 pH    POCT pCO2, Venous 36 (L) 41 - 51 mm Hg    POCT pO2, Venous 33 (L) 35 - 45 mm Hg    POCT SO2, Venous 37 (L) 45 - 75 %    POCT Oxy Hemoglobin, Venous 36.3 (L) 45.0 - 75.0 %    POCT Base Excess, Venous -0.7 -2.0 - 3.0 mmol/L    POCT HCO3 Calculated, Venous 23.4 22.0 - 26.0 mmol/L    Patient Temperature 37.0 degrees Celsius    FiO2 21 %    Test Comment [facility] LAB 1-S    Lactate   Result Value Ref Range    Lactate 3.3 (H) 0.4 - 2.0 mmol/L   Morphology   Result Value Ref Range    RBC Morphology See Below     Hypochromia Mild     Ovalocytes Few    CARRIE without exercise   Result Value Ref Range    BSA 1.66 m2   Blood Gas Venous Full Panel   Result Value Ref Range    POCT pH, Venous 7.32 (L) 7.33 - 7.43 pH    POCT pCO2, Venous 44 41 - 51 mm Hg    POCT pO2, Venous 45 35 - 45 mm Hg    POCT SO2, Venous 58 45 - 75 %    POCT Oxy Hemoglobin, Venous 57.6 45.0 - 75.0 %    POCT Hematocrit Calculated, Venous 32.0 (L) 36.0 - 46.0 %    POCT Sodium, Venous 131 (L) 136 - 145 mmol/L    POCT Potassium, Venous 3.4 (L) 3.5 - 5.3 mmol/L    POCT Chloride, Venous 98 98 - 107 mmol/L     POCT Ionized Calicum, Venous 1.11 1.10 - 1.33 mmol/L    POCT Glucose, Venous 83 74 - 99 mg/dL    POCT Lactate, Venous 1.7 0.4 - 2.0 mmol/L    POCT Base Excess, Venous -3.3 (L) -2.0 - 3.0 mmol/L    POCT HCO3 Calculated, Venous 22.7 22.0 - 26.0 mmol/L    POCT Hemoglobin, Venous 10.5 (L) 12.0 - 16.0 g/dL    POCT Anion Gap, Venous 14.0 10.0 - 25.0 mmol/L    Patient Temperature 37.0 degrees Celsius    FiO2 21 %   MRSA Surveillance for Vancomycin De-escalation, PCR    Specimen: Anterior Nares; Swab   Result Value Ref Range    MRSA PCR Not Detected Not Detected   Transthoracic Echo (TTE) Complete   Result Value Ref Range    LVOT diam 1.96     LV biplane EF 16     MV E/A ratio 0.67     MV avg E/e' ratio 31.13     Tricuspid annular plane systolic excursion 1.5     LA vol index A/L 44.3     AV pk anthony 0.85     RV free wall pk S' 12.88     RVSP 41.3     LVIDd 4.53     Aortic Valve Area by Continuity of Peak Velocity 2.12     AV pk grad 2.9     LV A4C EF 17.1      Transthoracic Echo (TTE) Complete    Result Date: 11/28/2023              [place] [address]      Phone [phone] Fax [phone] TRANSTHORACIC ECHOCARDIOGRAM REPORT  Patient Name:      SHAZIA Che Physician:    [id number] Wil Leiva MD Study Date:        11/28/2023           Ordering Provider:    [id number] MIR WASHINGTON MRN/PID:           [id number]             Fellow: Accession#:        [id number]         Nurse: Date of Birth/Age: 1935 / 88      Sonographer:          Laura copeland RDCS Gender:            F                    Additional Staff: Height:            162.56 cm            Admit Date:           11/26/2023 Weight:            60.78 kg             Admission Status:     Inpatient -                                                                Routine BSA:               1.65 m2              Department Location:  [facility] Blood Pressure: 97 /71 mmHg Study Type:    TRANSTHORACIC ECHO (TTE) COMPLETE Diagnosis/ICD: Acute combined systolic (congestive) and diastolic (congestive)                heart failure (CHF)-I50.41 Indication:    Congestive Heart Failure CPT Codes:     Echo Complete w Full Doppler-14600  Study Detail: The following Echo studies were performed: 2D, M-Mode, Doppler and               color flow.  PHYSICIAN INTERPRETATION: Left Ventricle: Left ventricular systolic function is severely decreased, with an estimated ejection fraction of 10-15%. There is global hypokinesis of the left ventricle with minor regional variations. The left ventricular cavity size is normal. Spectral Doppler shows an impaired relaxation pattern of left ventricular diastolic filling. Left Atrium: The left atrium is mildly dilated. Right Ventricle: The right ventricle is mildly enlarged. There is reduced right ventricular systolic function. Right Atrium: The right atrium is mildly dilated. Aortic Valve: The aortic valve is trileaflet. There is moderate aortic valve regurgitation. The peak instantaneous gradient of the aortic valve is 2.9 mmHg. Mitral Valve: The mitral valve is normal in structure. There is mild to moderate mitral valve regurgitation. Tricuspid Valve: The tricuspid valve is structurally normal. There is moderate to severe tricuspid regurgitation. The Doppler estimated RVSP is mildly elevated at 41.3 mmHg. Echodensity visualized in the IVC. Pulmonic Valve: The pulmonic valve is structurally normal. There is mild pulmonic valve regurgitation. Pericardium: There is no pericardial effusion noted. Pleural: There is a large bilateral pleural effusion. Aorta: The aortic root is normal.  CONCLUSIONS:  1. Left ventricular systolic function is severely decreased with a 10-15% estimated ejection fraction.  2. Spectral Doppler  shows an impaired relaxation pattern of left ventricular diastolic filling.  3. There is reduced right ventricular systolic function.  4. There is a large pleural effusion.  5. Mild to moderate mitral valve regurgitation.  6. Mildly elevated RVSP.  7. Moderate to severe tricuspid regurgitation.  8. Moderate aortic valve regurgitation.  9. There is global hypokinesis of the left ventricle with minor regional variations. QUANTITATIVE DATA SUMMARY: 2D MEASUREMENTS:                          Normal Ranges: Ao Root d:     3.20 cm   (2.0-3.7cm) LAs:           4.56 cm   (2.7-4.0cm) IVSd:          0.63 cm   (0.6-1.1cm) LVPWd:         0.60 cm   (0.6-1.1cm) LVIDd:         4.53 cm   (3.9-5.9cm) LVIDs:         4.28 cm LV Mass Index: 49.8 g/m2 LV % FS        5.5 % LA VOLUME:                               Normal Ranges: LA Vol A4C:        61.8 ml    (22+/-6mL/m2) LA Vol A2C:        85.1 ml LA Vol BP:         73.0 ml LA Vol Index A4C:  37.4ml/m2 LA Vol Index A2C:  51.6 ml/m2 LA Vol Index BP:   44.3 ml/m2 LA Area A4C:       21.9 cm2 LA Area A2C:       25.9 cm2 LA Major Axis A4C: 6.6 cm LA Major Axis A2C: 6.7 cm LA Vol A4C:        60.5 ml LA Vol A2C:        87.5 ml RA VOLUME BY A/L METHOD:                       Normal Ranges: RA Area A4C: 23.8 cm2 AORTA MEASUREMENTS:                    Normal Ranges: Ao STJ, d: 2.30 cm (1.7-3.4cm) Asc Ao, d: 3.50 cm (2.1-3.4cm) LV SYSTOLIC FUNCTION BY 2D PLANIMETRY (MOD):                     Normal Ranges: EF-A4C View: 17.1 % (>=55%) EF-A2C View: 18.2 % EF-Biplane:  16.4 % LV DIASTOLIC FUNCTION:                           Normal Ranges: MV Peak E:    0.62 m/s    (0.7-1.2 m/s) MV Peak A:    0.93 m/s    (0.42-0.7 m/s) E/A Ratio:    0.67        (1.0-2.2) MV e'         0.02 m/s    (>8.0) MV lateral e' 0.02 m/s MV medial e'  0.02 m/s MV A Dur:     108.47 msec E/e' Ratio:   31.13       (<8.0) MITRAL VALVE:                Normal Ranges: MV DT: 49 msec (150-240msec) MITRAL INSUFFICIENCY:                            Normal Ranges: PISA Radius:  0.5 cm MR VTI:       132.63 cm MR Vmax:      392.73 cm/s MR Alias Juan: 39.3 cm/s MR Volume:    25.17 ml MR Flow Rt:   74.55 ml/s MR EROA:      0.19 cm2 AORTIC VALVE:                         Normal Ranges: AoV Vmax:      0.85 m/s (<=1.7m/s) AoV Peak P.9 mmHg (<20mmHg) LVOT Max Juan:  0.59 m/s (<=1.1m/s) LVOT VTI:      10.39 cm LVOT Diameter: 1.96 cm  (1.8-2.4cm) AoV Area,Vmax: 2.12 cm2 (2.5-4.5cm2) AORTIC INSUFFICIENCY: AI Vmax:       3.14 m/s AI Half-time:  453 msec AI Decel Time: 1563 msec AI Decel Rate: 204.64 cm/s2  RIGHT VENTRICLE: RV Basal 4.36 cm RV Mid   3.40 cm RV Major 6.0 cm TAPSE:   14.8 mm RV s'    0.13 m/s TRICUSPID VALVE/RVSP:                             Normal Ranges: Peak TR Velocity: 2.89 m/s RV Syst Pressure: 41.3 mmHg (< 30mmHg) IVC Diam:         1.98 cm TV e'             0.1 m/s AORTA: Asc Ao Diam 3.46 cm  [id number] Wil Leiva MD Electronically signed on 2023 at 1:00:55 PM  ** Final **     XR chest 1 view    Result Date: 2023  Interpreted By:  Daniella Baker, STUDY: XR CHEST 1 VIEW;  2023 10:58 am   INDICATION: Signs/Symptoms:placement of central line.   COMPARISON: 2023   ACCESSION NUMBER(S): [id number]   ORDERING CLINICIAN: MITCH PADILLA   FINDINGS: Cardiac silhouette appears enlarged. There are atherosclerotic changes of the aorta.   There is bilateral parenchymal infiltration. There are bilateral pleural effusions.   Degenerative changes present on the right with tip in the region of the superior vena cava.   There are degenerative changes of the spine.   COMPARISON OF FINDING: The catheter was placed in the interval.       Interval placement of a right jugular catheter. Findings suggestive of congestive heart failure.   MACRO: none   Signed by: Daniella Baker 2023 11:06 AM Dictation workstation:   [id number]    CARRIE without exercise    Result Date: 2023  Preliminary Cardiology Report              [facility]  [address] [place] [address]      Phone [phone] Fax [phone]            Preliminary Vascular Lab Report  [facility] US ANKLE BRACHIAL INDEX (CARRIE) WITHOUT EXERCISE  Patient Name:     SHAZIA TANA Che Physician:  [id number] Maribell Rao MD Study Date:       11/28/2023     Ordering Provider:  [id number] KEON DELATORRE MRN/PID:          [id number]       Fellow: Accession#:       [id number]   Technologist:       Yasmin Alamo RVT YOB: 1935     Technologist 2: Gender:           F              Encounter#:         [id number] Admission Status: Inpatient      Location Performed: [facility]  Diagnosis/ICD: Peripheral vascular disease, unspecified-I73.9  PRELIMINARY CONCLUSIONS:  Right Lower PVR: No evidence of arterial occlusive disease in the right lower extremity at rest. Triphasic flow is noted in the right common femoral artery, right posterior tibial artery and right dorsalis pedis artery. Left Lower PVR: No evidence of arterial occlusive disease in the left lower extremity at rest. Biphasic flow is noted in the left posterior tibial artery. Triphasic flow is noted in the left common femoral artery and left dorsalis pedis artery. Additional Findings: Technically limited due to movement, cooperation, pain tolerance to cuffs.  Imaging & Doppler Findings:  RIGHT Lower PVR                Pressures Ratios Right Posterior Tibial (Ankle) 108 mmHg  1.11 Right Dorsalis Pedis (Ankle)   104 mmHg  1.07 Right Digit (Great Toe)        65 mmHg   0.67   LEFT Lower PVR                Pressures Ratios Left Posterior Tibial (Ankle) 109 mmHg  1.12 Left Dorsalis Pedis (Ankle)   111 mmHg  1.14 Left Digit (Great Toe)        45 mmHg   0.46                      Left Brachial Pressure 97 mmHg   VASCULAR PRELIMINARY REPORT completed by Yasmin Alamo RVT on 11/28/2023 at 7:44:46 AM  ** Final **     XR chest 1 view    Result Date: 11/28/2023  Interpreted By:  Alfredito Rivera, STUDY: XR CHEST 1  VIEW;  11/27/2023 11:55 pm   INDICATION: Signs/Symptoms:CHF.   COMPARISON: 08/21/2022   ACCESSION NUMBER(S): [id number]   ORDERING CLINICIAN: KEON DELATORRE   FINDINGS: The heart is enlarged The pulmonary vasculature is congested centrally and indistinct peripherally, with interlobular septal thickening and indistinct mid to lower lung consolidative opacities consistent with pulmonary edema. There are large bilateral pleural effusions.       Severe pulmonary edema and large bilateral pleural effusions.     MACRO: None.   Signed by: Alfredito Rivera 11/28/2023 2:16 AM Dictation workstation:   [id number]    CT angio lower extremity left w and or wo IV contrast    Result Date: 11/26/2023  INDICATION:  Multiple left foot wounds with purple discoloration and cold foot.  Assess for arterial occlusion. TECHNIQUE:  CTA images were obtained through the left lower extremity without and with intravenous contrast.   Omnipaque 350, 100 mL was administered intravenously.  Images are reviewed and processed at a workstation according to the CT angiogram protocol with 3-D and/or MIP post processing imaging generated. Automated mA/kV exposure control was utilized and patient examination was performed in strict accordance with principles of ALARA. COMPARISON:  US venous left lower extremity 10/09/2023.  XR left foot 10/09/2023. FINDINGS:  Limited imaging of the abdomen demonstrates an IVC filter.  There is extensive atherosclerotic disease and mural thrombus in the abdominal aorta there is a mild stenosis of the inferior mesenteric artery.  The left renal artery is not fully evaluated but does not appear to have flow limiting stenosis.  There is no evidence of abdominal aortic aneurysmal disease. There is colonic diverticulosis without evidence of diverticulitis. . This been previous hysterectomy.  Sclerotic lesion is seen in the right iliac bones.  Plain film radiography is recommended.  Clinical correlation is advised. On the  right: Common iliac artery is patent.  The external iliac artery is patent.  The right common femoral artery is not visualized remain in the right lower extremity was not studied. On the left: Common iliac artery is patent.  The external iliac artery is patent.  Common femoral artery is patent.  The superficial femoral artery is patent.  Popliteal artery is patent. Anterior tibial artery, tibioperoneal trunk, posterior tibial artery, peroneal artery, dorsalis pedis artery and pedal posterior tibial artery are patent. Soft tissues of the left lower extremity are diffusely thickened consistent with advanced edema.  The musculature is poorly defined due to the edema which extends into muscular compartments.  Underlying cellulitis is not excluded.  There is a left knee joint effusion. Degenerative changes are noted at the left knee.    1. IVC filter in place. 2. Extensive atherosclerotic disease of the abdominal aorta without evidence of aneurysmal disease. 3. Mild stenosis of the inferior mesenteric artery.  4. Extensive cellulitis or soft tissue edema involving the entire left lower extremity. On the right: 1.  No evidence of iliac artery stenosis. 2.  The right common femoral artery was not studied. On the left: 1.  Cellulitis or soft tissue edema involving the entire left lower extremity. 2.  No evidence of arterial occlusion. Signed by Alfredito Artis M.D.      Assessment/Plan   Principal Problem:    Shock (CMS/HCC)  Active Problems:    Weakness    Acute systolic heart failure (CMS/HCC)    Paroxysmal atrial fibrillation (CMS/HCC)    Cellulitis of left leg    Aspiration into airway    History of non-Hodgkin's lymphoma    History of DVT of lower extremity    Patricia Jewell is a 88 y.o. female with CHF, atrial fibrillation on Eliquis, hypertension, history of non-Hodgkin's lymphoma in remission since 2005, history of right lower extremity blood clot s/p IVC filter was transferred to the ICU because of shock requiring  vasopressors.      Septic shock secondary to cellulitis and high risk of aspiration  11/28/2023: This morning when I I was consulted she was on phenylephrine.   I changed from phenylephrine to Levophed to avoid hypervolemia and congestive heart failure exacerbation patient.    Could not use 30 cc/kg IV fluids because of CHF exacerbation  Placed a central line.  Please refer to procedure note  Titrate Levophed to maintain MAP more than 60  Held Lasix drip and Coreg because of hypotension  Continue vancomycin, escalated from a cefepime to Zosyn for anaerobic coverage  Panculture and MRSA screen pending    2.  Congestive heart failure exacerbation  Holding on Lasix because of high-dose requirement of vasopressors  Will resume Lasix once blood pressure is better controlled    3.  Atrial fibrillation with RVR  Continue Eliquis  Holding Coreg because of hypotension  Maintain heart rate less than 120    4.  High risk of aspiration  Consulted speech therapy for evaluation    5.  Acute encephalopathy probably secondary to septic shock  Patient is alert oriented x 2, does not have a capacity to make medical decisions.    Septic shock management as above    6.  Counseling regarding advance care directives and goals of care  Patient is alert oriented x 2, does not have a capacity to make medical decisions.    I had extensive discussion with the patient daughter and grandson who are at bedside.    As per the patient daughter, grandson is a medical power of  for the patient.   Explained the difference between full code versus DNR  Family wants to continue full code for now  Palliative care consulted    I spent 50 minutes of cumulative critical care time with the patient, not including procedure time.  Greater than 50% of that time was spent in the direct collaboration and or coordination of care of the patient.     Dragon dictation software was used to dictate this note and thus there may be minor errors in  translation/transcription including garbled speech or misspellings. Please contact for clarification if needed.   Oziel Hassan MD

## 2023-11-28 NOTE — PROGRESS NOTES
"Vancomycin Dosing by Pharmacy- FOLLOW UP    Patricia Jewell is a 88 y.o. year old female who Pharmacy has been consulted for vancomycin dosing for cellulitis, skin and soft tissue. Based on the patient's indication and renal status this patient is being dosed based on a goal AUC of 400-600.     Renal function is currently stable.    Current vancomycin dose: 750 mg given every 24 hours    Estimated vancomycin AUC on current dose: 514 mg/L.hr     Visit Vitals  BP 94/61   Pulse 99   Temp 36.5 °C (97.7 °F)   Resp 25        Lab Results   Component Value Date    CREATININE 1.21 (H) 11/28/2023    CREATININE 1.09 (H) 11/27/2023    CREATININE 1.13 (H) 11/26/2023    CREATININE 1.23 (H) 10/24/2023        Patient weight is No results found for: \"PTWEIGHT\"    No results found for: \"CULTURE\"     I/O last 3 completed shifts:  In: 2840 (46.6 mL/kg) [P.O.:350; I.V.:340 (5.6 mL/kg); IV Piggyback:2150]  Out: - (0 mL/kg)   Weight: 61 kg   [unfilled]    Lab Results   Component Value Date    PATIENTTEMP 37.0 11/28/2023    PATIENTTEMP 37.0 11/26/2023    PATIENTTEMP 37.0 08/24/2022    PATIENTTEMP 37.0 08/24/2022        Assessment/Plan    Within goal AUC range. Continue current vancomycin regimen.    This dosing regimen is predicted by [employer] to result in the following pharmacokinetic parameters:  Loading dose: N/A  Regimen: 750 mg IV every 24 hours.  Start time: 21:58 on 11/28/2023  Exposure target: AUC24 (range)400-600 mg/L.hr   AUC24,ss: 514 mg/L.hr  Probability of AUC24 > 400: 88 %  Ctrough,ss: 15.7 mg/L  Probability of Ctrough,ss > 20: 20 %  Probability of nephrotoxicity (Lodise FRED 2009): 11 %    The next level will be obtained on 12/3 at 0500. May be obtained sooner if clinically indicated.   Will continue to monitor renal function daily while on vancomycin and order serum creatinine at least every 48 hours if not already ordered.  Follow for continued vancomycin needs, clinical response, and signs/symptoms of toxicity. "       Ramírez Ray, h

## 2023-11-29 NOTE — PROGRESS NOTES
[facility]    Patient Name: Patricia Jewell  Patient : 1935  Room/Bed: 15/15-A    Date: 23  Time: 9:25 AM    Referred by Dr. German ref. provider found for left foot swelling/ infection     Subjective:  Patient not currently on IV vasopressor support although blood pressure have been mildly hypotensive at times.  Echocardiogram done yesterday showing severely reduced left ventricular systolic function with an ejection fraction of 10 to 15%, grade 1 diastolic dysfunction, reduced right ventricular systolic function, mild to moderate mitral valve regurgitation, moderate to severe tricuspid valve regurgitation, moderate aortic valve regurgitation, and a large pleural effusion.  Discussed condition with patient's daughter who was present in the room during my exam.  I discussed options such as left and right heart catheterization and the possibility of additional cardiac interventions.  The daughter reports she plans to discuss possible palliative care later this morning and wanted to defer decision-making until that time.  Patient is on apixaban which would need to be held prior to any invasive procedures.    Assessment/Plan:   1.  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  Chest x-ray showing severe pulmonary edema and large bilateral pleural effusions.  Vitals report showing periods of tachypnea this morning with respiratory rates as high as 37.  Blood pressures have been hypotensive at times requiring vasopressor support.  I will start the patient on a furosemide infusion for diuresis.  Would continue IV vasopressors for blood pressure support.  Echocardiogram done 2022 showed low normal left ventricular systolic function with an ejection fraction of 50 to 55%, grade 1 diastolic dysfunction, moderate to severe tricuspid valve  regurgitation, and mild to moderate aortic valve regurgitation.   Repeat echocardiogram has been ordered.  Continue to monitor urine output, renal function, and serum electrolytes while here.    11/29: Patient appears volume overloaded on physical exam.  Again, she appears to need diuresis which was held yesterday secondary to ongoing hypotension.  Echocardiogram done yesterday showing severely reduced left ventricular systolic function with an ejection fraction of 10 to 15%, grade 1 diastolic dysfunction, reduced right ventricular systolic function, mild to moderate mitral valve regurgitation, moderate to severe tricuspid valve regurgitation, moderate aortic valve regurgitation, and a large pleural effusion.  Discussed condition with patient's daughter who was present in the room during my exam.  I discussed options such as left and right heart catheterization and the possibility of additional cardiac interventions.  The daughter reports she plans to discuss possible palliative care later this morning and wanted to defer decision-making until that time.  Patient is on apixaban which would need to be held prior to any invasive procedures.    2.  Paroxysmal atrial fibrillation  Patient has a history of paroxysmal atrial fibrillation and has been on apixaban and amiodarone at home.  Patient's daughter reports medication noncompliance at home.  Telemetry currently showing heart rates in the upper 90s to low 100s.  Would continue with current medical therapy.    11/29: Telemetry showing controlled heart rates.  Would continue current medical therapy.  Will need to hold apixaban if family decides they want to proceed with further invasive evaluation as discussed above.  Patient's daughter plans to meet with palliative care later this morning.    3.  Tricuspid valve regurgitation  Patient has a history of moderate to severe tricuspid valve regurgitation and appears to be in acutely decompensated heart failure.  Repeat  echocardiogram has been ordered.    11/29: Echocardiogram done yesterday showing severely reduced left ventricular systolic function with an ejection fraction of 10 to 15%, grade 1 diastolic dysfunction, reduced right ventricular systolic function, mild to moderate mitral valve regurgitation, moderate to severe tricuspid valve regurgitation, moderate aortic valve regurgitation, and a large pleural effusion.     4.  Coronary artery disease  The patient has a history of nonobstructive coronary artery disease as seen on cardiac catheterization done in August 2022.    11/29: I discussed possible left and right heart catheterization with the patient's daughter given her severely reduced LV function.  Patient daughter reports she plans to meet with palliative care later this morning and defer decision-making until that time.  Patient is on apixaban which would need to be held prior to any invasive procedures.    5.  Left foot cellulitis/sepsis  Patient on antibiotic therapy per infectious disease.  Currently on IV norepinephrine for blood pressure support.    11/29: Patient not currently on IV vasopressor support although with hypotension at times.  Management has been through infectious disease and the critical care service.    Past Medical History:  She has a past medical history of Fracture of left shoulder girdle, part unspecified, subsequent encounter for fracture with delayed healing, History of falling, Personal history of non-Hodgkin lymphomas, Personal history of other venous thrombosis and embolism, and Personal history of pulmonary embolism.    Past Surgical History:  She has a past surgical history that includes Other surgical history (09/14/2016); Hysterectomy (09/14/2016); Other surgical history (09/14/2016); Cataract extraction (10/10/2018); Hernia repair (10/05/2016); XR chest pacemaker w fluoro (3/16/2023); and CT angio lower extremity left w and or wo IV contrast (Left, 11/26/2023).      Social  History:  She reports that she quit smoking about 16 years ago. Her smoking use included cigarettes. She has a 10.00 pack-year smoking history. She has been exposed to tobacco smoke. She has never used smokeless tobacco. She reports that she does not currently use alcohol. She reports that she does not currently use drugs.    Family History:  Family History   Problem Relation Name Age of Onset    Hypertension Mother      Hyperlipidemia Mother      Hypertension Father      Hyperlipidemia Father      Esophageal cancer Brother          Allergies:  Patient has no known allergies.    Outpatient Medications:  Current Outpatient Medications   Medication Instructions    acetaminophen (TYLENOL) 325 mg, oral, Every 6 hours PRN    amiodarone (PACERONE) 200 mg, oral, Daily    carvedilol (Coreg) 12.5 mg tablet 1.5 tablets, oral, 2 times daily    Eliquis 2.5 mg, oral, 2 times daily    Farxiga 10 mg, oral, Daily before breakfast    furosemide (LASIX) 40 mg, oral, Daily    magnesium (as gluconate) (MAGONATE) 27 mg, oral, Daily    potassium chloride CR 20 mEq ER tablet 20 mEq, oral, Daily, Do not crush or chew.    sacubitriL-valsartan (Entresto) 49-51 mg tablet 1 tablet, oral, 2 times daily    spironolactone (ALDACTONE) 25 mg, oral, Daily        ROS:  A 14 point review of systems was done and is negative other than as stated in HPI    Vitals:  Vitals:    11/29/23 0630 11/29/23 0700 11/29/23 0800 11/29/23 0900   BP: 110/64 89/60 88/50 (!) 82/48   Pulse: 89 86 80 80   Resp: 25 23 25 (!) 27   Temp:  36 °C (96.8 °F)     TempSrc:  Temporal     SpO2: 100% 97% 95% 98%   Weight:       Height:           Physical Exam:     Constitutional: Confused, unable to appropriately answer questions, opens eyes to stimulation  Skin: Skin color, texture, turgor normal. No rashes or lesions.  Head: Normocephalic. No masses, lesions, tenderness or abnormalities  Eyes: Extraocular movements are grossly intact.  Mouth and throat: Mucous membranes  moist  Neck: Neck supple, no carotid bruits, no JVD  Respiratory: Rales bilaterally, mildly tachypneic  Chest wall: No scars, normal excursion with respiration  Cardiovascular: Regular rhythm with + murmur  Gastrointestinal: Abdomen soft, nontender. Bowel sounds normal.  Extremities: + pitting edema    Intake/Output:   I/O last 2 completed shifts:  In: 1659.7 (26.4 mL/kg) [P.O.:10; I.V.:849.7 (13.5 mL/kg); IV Piggyback:800]  Out: 569 (9 mL/kg) [Urine:569 (0.4 mL/kg/hr)]  Weight: 62.9 kg     Outpatient Medications  No current facility-administered medications on file prior to encounter.     Current Outpatient Medications on File Prior to Encounter   Medication Sig Dispense Refill    acetaminophen (Tylenol) 325 mg tablet Take 1 tablet (325 mg) by mouth every 6 hours if needed for mild pain (1 - 3).      amiodarone (Pacerone) 200 mg tablet Take 1 tablet (200 mg) by mouth once daily.      carvedilol (Coreg) 12.5 mg tablet Take 1.5 tablets (18.75 mg) by mouth 2 times a day.      Eliquis 2.5 mg tablet Take 1 tablet (2.5 mg) by mouth 2 times a day. 180 tablet 1    Farxiga 10 mg Take 1 tablet (10 mg) by mouth once daily in the morning. Take before meals.      furosemide (Lasix) 40 mg tablet Take 1 tablet (40 mg) by mouth once daily.      magnesium, as gluconate, (Magonate) 27 mg magnesium (500 mg) tablet Take 1 tablet (27 mg) by mouth once daily. (Patient not taking: Reported on 11/28/2023) 60 tablet 11    potassium chloride CR 20 mEq ER tablet Take 1 tablet (20 mEq) by mouth once daily. Do not crush or chew.      sacubitriL-valsartan (Entresto) 49-51 mg tablet Take 1 tablet by mouth 2 times a day. 180 tablet 3    spironolactone (Aldactone) 25 mg tablet Take 1 tablet (25 mg) by mouth once daily. 90 tablet 1    [DISCONTINUED] atorvastatin (Lipitor) 40 mg tablet Take 1 tablet (40 mg) by mouth once daily.      [DISCONTINUED] cephalexin (Keflex) 250 mg capsule Take 1 capsule (250 mg) by mouth 4 times a day. 28 capsule 0     [DISCONTINUED] omeprazole OTC (PriLOSEC OTC) 20 mg EC tablet Take 1 tablet (20 mg) by mouth once daily in the morning. Take before meals. Do not crush, chew, or split.         Scheduled medications  amiodarone, 200 mg, oral, Daily  apixaban, 2.5 mg, oral, BID  [Held by provider] carvedilol, 6.25 mg, oral, BID  clindamycin, 900 mg, intravenous, q8h  magnesium oxide, 400 mg, oral, Daily  meropenem, 2 g, intravenous, q12h  mirtazapine, 7.5 mg, oral, Nightly  polyethylene glycol, 17 g, oral, Daily  vancomycin, 750 mg, intravenous, q24h      Continuous medications  [Held by provider] furosemide, 5 mg/hr  norepinephrine in sodium chloride 0.9 %, 0.05-1 mcg/kg/min, Last Rate: Stopped (11/28/23 1430)      PRN medications  PRN medications: acetaminophen **OR** acetaminophen **OR** acetaminophen, ondansetron **OR** ondansetron   Medications Prior to Admission   Medication Sig Dispense Refill Last Dose    acetaminophen (Tylenol) 325 mg tablet Take 1 tablet (325 mg) by mouth every 6 hours if needed for mild pain (1 - 3).   Unknown    amiodarone (Pacerone) 200 mg tablet Take 1 tablet (200 mg) by mouth once daily.   Unknown    carvedilol (Coreg) 12.5 mg tablet Take 1.5 tablets (18.75 mg) by mouth 2 times a day.   Unknown    Eliquis 2.5 mg tablet Take 1 tablet (2.5 mg) by mouth 2 times a day. 180 tablet 1 Unknown    Farxiga 10 mg Take 1 tablet (10 mg) by mouth once daily in the morning. Take before meals.   Unknown    furosemide (Lasix) 40 mg tablet Take 1 tablet (40 mg) by mouth once daily.   Unknown    magnesium, as gluconate, (Magonate) 27 mg magnesium (500 mg) tablet Take 1 tablet (27 mg) by mouth once daily. (Patient not taking: Reported on 11/28/2023) 60 tablet 11 Not Taking    potassium chloride CR 20 mEq ER tablet Take 1 tablet (20 mEq) by mouth once daily. Do not crush or chew.   Not Taking    sacubitriL-valsartan (Entresto) 49-51 mg tablet Take 1 tablet by mouth 2 times a day. 180 tablet 3 Unknown    spironolactone  (Aldactone) 25 mg tablet Take 1 tablet (25 mg) by mouth once daily. 90 tablet 1 Unknown       Recent Labs: (past 2 days)  Recent Results (from the past 48 hour(s))   Lactate    Collection Time: 11/27/23 10:27 AM   Result Value Ref Range    Lactate 3.1 (H) 0.4 - 2.0 mmol/L   POCT GLUCOSE    Collection Time: 11/27/23 10:58 AM   Result Value Ref Range    POCT Glucose 69 (L) 74 - 99 mg/dL   Tissue/Wound Culture/Smear    Collection Time: 11/27/23  1:53 PM    Specimen: Wound/Tissue; Tissue/Biopsy   Result Value Ref Range    Gram Stain (1+) Rare Polymorphonuclear leukocytes (A)     Gram Stain (A)      (4+) Abundant Mixed Gram positive and Gram negative bacteria   Lactate    Collection Time: 11/27/23  1:55 PM   Result Value Ref Range    Lactate 4.7 (HH) 0.4 - 2.0 mmol/L   Sedimentation rate, automated    Collection Time: 11/27/23  1:55 PM   Result Value Ref Range    Sedimentation Rate 5 0 - 30 mm/h   Blood Culture    Collection Time: 11/27/23  4:39 PM    Specimen: Peripheral Venipuncture; Blood culture   Result Value Ref Range    Blood Culture No growth at 1 day    Blood Culture    Collection Time: 11/27/23  4:39 PM    Specimen: Peripheral Venipuncture; Blood culture   Result Value Ref Range    Blood Culture No growth at 1 day    Vancomycin, Trough    Collection Time: 11/28/23 12:08 AM   Result Value Ref Range    Vancomycin, Trough 31.1 (HH) 5.0 - 20.0 ug/mL   CBC and Auto Differential    Collection Time: 11/28/23 12:08 AM   Result Value Ref Range    WBC 7.3 4.4 - 11.3 x10*3/uL    nRBC 0.0 0.0 - 0.0 /100 WBCs    RBC 3.81 (L) 4.00 - 5.20 x10*6/uL    Hemoglobin 9.7 (L) 12.0 - 16.0 g/dL    Hematocrit 31.2 (L) 36.0 - 46.0 %    MCV 82 80 - 100 fL    MCH 25.5 (L) 26.0 - 34.0 pg    MCHC 31.1 (L) 32.0 - 36.0 g/dL    RDW 21.2 (H) 11.5 - 14.5 %    Platelets 151 150 - 450 x10*3/uL    Neutrophils % 79.5 40.0 - 80.0 %    Immature Granulocytes %, Automated 0.5 0.0 - 0.9 %    Lymphocytes % 12.5 13.0 - 44.0 %    Monocytes % 6.7 2.0 - 10.0  %    Eosinophils % 0.4 0.0 - 6.0 %    Basophils % 0.4 0.0 - 2.0 %    Neutrophils Absolute 5.78 (H) 1.60 - 5.50 x10*3/uL    Immature Granulocytes Absolute, Automated 0.04 0.00 - 0.50 x10*3/uL    Lymphocytes Absolute 0.91 0.80 - 3.00 x10*3/uL    Monocytes Absolute 0.49 0.05 - 0.80 x10*3/uL    Eosinophils Absolute 0.03 0.00 - 0.40 x10*3/uL    Basophils Absolute 0.03 0.00 - 0.10 x10*3/uL   Magnesium    Collection Time: 11/28/23 12:08 AM   Result Value Ref Range    Magnesium 1.46 (L) 1.60 - 2.40 mg/dL   Comprehensive metabolic panel    Collection Time: 11/28/23 12:08 AM   Result Value Ref Range    Glucose 76 74 - 99 mg/dL    Sodium 131 (L) 136 - 145 mmol/L    Potassium 3.6 3.5 - 5.3 mmol/L    Chloride 96 (L) 98 - 107 mmol/L    Bicarbonate 23 21 - 32 mmol/L    Anion Gap 16 10 - 20 mmol/L    Urea Nitrogen 27 (H) 6 - 23 mg/dL    Creatinine 1.21 (H) 0.50 - 1.05 mg/dL    eGFR 44 (L) >60 mL/min/1.73m*2    Calcium 8.1 (L) 8.6 - 10.3 mg/dL    Albumin 3.1 (L) 3.4 - 5.0 g/dL    Alkaline Phosphatase 59 33 - 136 U/L    Total Protein 4.8 (L) 6.4 - 8.2 g/dL    AST 18 9 - 39 U/L    Bilirubin, Total 1.5 (H) 0.0 - 1.2 mg/dL    ALT 12 7 - 45 U/L   Phosphorus    Collection Time: 11/28/23 12:08 AM   Result Value Ref Range    Phosphorus 4.1 2.5 - 4.9 mg/dL   BLOOD GAS VENOUS    Collection Time: 11/28/23 12:08 AM   Result Value Ref Range    POCT pH, Venous 7.42 7.33 - 7.43 pH    POCT pCO2, Venous 36 (L) 41 - 51 mm Hg    POCT pO2, Venous 33 (L) 35 - 45 mm Hg    POCT SO2, Venous 37 (L) 45 - 75 %    POCT Oxy Hemoglobin, Venous 36.3 (L) 45.0 - 75.0 %    POCT Base Excess, Venous -0.7 -2.0 - 3.0 mmol/L    POCT HCO3 Calculated, Venous 23.4 22.0 - 26.0 mmol/L    Patient Temperature 37.0 degrees Celsius    FiO2 21 %    Test Comment OM LAB 1-S    Lactate    Collection Time: 11/28/23 12:08 AM   Result Value Ref Range    Lactate 3.3 (H) 0.4 - 2.0 mmol/L   Morphology    Collection Time: 11/28/23 12:08 AM   Result Value Ref Range    RBC Morphology See  Below     Hypochromia Mild     Ovalocytes Few    CARRIE without exercise    Collection Time: 11/28/23  7:45 AM   Result Value Ref Range    BSA 1.66 m2   Blood Gas Venous Full Panel    Collection Time: 11/28/23 11:17 AM   Result Value Ref Range    POCT pH, Venous 7.32 (L) 7.33 - 7.43 pH    POCT pCO2, Venous 44 41 - 51 mm Hg    POCT pO2, Venous 45 35 - 45 mm Hg    POCT SO2, Venous 58 45 - 75 %    POCT Oxy Hemoglobin, Venous 57.6 45.0 - 75.0 %    POCT Hematocrit Calculated, Venous 32.0 (L) 36.0 - 46.0 %    POCT Sodium, Venous 131 (L) 136 - 145 mmol/L    POCT Potassium, Venous 3.4 (L) 3.5 - 5.3 mmol/L    POCT Chloride, Venous 98 98 - 107 mmol/L    POCT Ionized Calicum, Venous 1.11 1.10 - 1.33 mmol/L    POCT Glucose, Venous 83 74 - 99 mg/dL    POCT Lactate, Venous 1.7 0.4 - 2.0 mmol/L    POCT Base Excess, Venous -3.3 (L) -2.0 - 3.0 mmol/L    POCT HCO3 Calculated, Venous 22.7 22.0 - 26.0 mmol/L    POCT Hemoglobin, Venous 10.5 (L) 12.0 - 16.0 g/dL    POCT Anion Gap, Venous 14.0 10.0 - 25.0 mmol/L    Patient Temperature 37.0 degrees Celsius    FiO2 21 %   MRSA Surveillance for Vancomycin De-escalation, PCR    Collection Time: 11/28/23 11:17 AM    Specimen: Anterior Nares; Swab   Result Value Ref Range    MRSA PCR Not Detected Not Detected   Transthoracic Echo (TTE) Complete    Collection Time: 11/28/23 11:32 AM   Result Value Ref Range    LVOT diam 1.96     LV biplane EF 16     MV E/A ratio 0.67     MV avg E/e' ratio 31.13     Tricuspid annular plane systolic excursion 1.5     LA vol index A/L 44.3     AV pk anthony 0.85     RV free wall pk S' 12.88     RVSP 41.3     LVIDd 4.53     Aortic Valve Area by Continuity of Peak Velocity 2.12     AV pk grad 2.9     LV A4C EF 17.1    CBC    Collection Time: 11/29/23  5:48 AM   Result Value Ref Range    WBC 7.2 4.4 - 11.3 x10*3/uL    nRBC 0.0 0.0 - 0.0 /100 WBCs    RBC 4.44 4.00 - 5.20 x10*6/uL    Hemoglobin 11.3 (L) 12.0 - 16.0 g/dL    Hematocrit 36.2 36.0 - 46.0 %    MCV 82 80 - 100 fL     MCH 25.5 (L) 26.0 - 34.0 pg    MCHC 31.2 (L) 32.0 - 36.0 g/dL    RDW 21.2 (H) 11.5 - 14.5 %    Platelets 219 150 - 450 x10*3/uL   Basic Metabolic Panel    Collection Time: 11/29/23  5:48 AM   Result Value Ref Range    Glucose 71 (L) 74 - 99 mg/dL    Sodium 135 (L) 136 - 145 mmol/L    Potassium 3.5 3.5 - 5.3 mmol/L    Chloride 100 98 - 107 mmol/L    Bicarbonate 25 21 - 32 mmol/L    Anion Gap 14 10 - 20 mmol/L    Urea Nitrogen 29 (H) 6 - 23 mg/dL    Creatinine 1.12 (H) 0.50 - 1.05 mg/dL    eGFR 48 (L) >60 mL/min/1.73m*2    Calcium 8.1 (L) 8.6 - 10.3 mg/dL       CV Studies:  EKG:No results found for this or any previous visit (from the past 4464 hour(s)).  Echocardiogram:   [place] [address]  Phone [phone] Fax [phone]    TRANSTHORACIC ECHOCARDIOGRAM REPORT      Patient Name:     SHAZIA Che Physician:  [id number] Nigel Schmid DO  Study Date:       11/15/2022     Referring           STEPHANE  Physician:          MANJIT  MRN/PID:          [id number]       PCP:  Accession/Order#: [id number]   [facility]  Location:  YOB: 1935     Fellow:  Gender:           F              Nurse:  Admit Date:       11/15/2022     Sonographer:        Kinga Bennett [facility]  Admission Status: Outpatient     Additional Staff:  Height:           157.48 cm      CC Report to:  Weight:           57.15 kg       Study Type:         Echocardiogram  BSA:              1.57 m2  Blood Pressure: 149 /54 mmHg    Diagnosis/ICD: I42.0-Dilated cardiomyopathy; I50.42-Chronic combined systolic  (congestive) and diastolic (congestive) heart failure (CHF)  Indication:    Congestive Heart Failure, non-ischemic cardiomyopathy  Procedure/CPT: Echo Complete w Full Doppler-[id number]  Study Detail: The following Echo studies were performed: 2D, M-Mode, Doppler and  color flow.      PHYSICIAN INTERPRETATION:  Left Ventricle:  Left ventricular systolic function is low normal, with an estimated ejection fraction of 50-55%. There are no regional wall motion abnormalities. The left ventricular cavity size is normal. Spectral Doppler shows an impaired relaxation pattern of left ventricular diastolic filling.  Left Atrium: The left atrium is normal in size.  Right Ventricle: The right ventricle is normal in size. There is normal right ventricular global systolic function.  Right Atrium: The right atrium is normal in size.  Aortic Valve: The aortic valve is trileaflet. There is mild aortic valve cusp calcification. There is no evidence of aortic valve stenosis.  There is mild to moderate aortic valve regurgitation. The mean gradient of the aortic valve is 3.0 mmHg.  Mitral Valve: The mitral valve is normal in structure. There is mild mitral annular calcification. There is mild mitral valve regurgitation.  Tricuspid Valve: The tricuspid valve is structurally normal. There is moderate to severe tricuspid regurgitation. The Doppler estimated RVSP is moderate to severely elevated at 77.6 mmHg.  Pulmonic Valve: The pulmonic valve is structurally normal. There is trace to mild pulmonic valve regurgitation.  Pericardium: There is no pericardial effusion noted.  Aorta: The aortic root is normal.  Systemic Veins: The inferior vena cava appears to be of normal size. There is IVC inspiratory collapse greater than 50%.      CONCLUSIONS:  1. Left ventricular systolic function is low normal with a 50-55% estimated ejection fraction.  2. Spectral Doppler shows an impaired relaxation pattern of left ventricular diastolic filling.  3. Moderate to severe tricuspid regurgitation.  4. Moderate to severely elevated right ventricular systolic pressure.  5. Aortic valve stenosis is not present.  6. Mild to moderate aortic valve regurgitation.    QUANTITATIVE DATA SUMMARY:  2D MEASUREMENTS:  Normal Ranges:  IVSd:          1.10 cm   (0.6-1.1cm)  LVPWd:         1.00 cm    (0.6-1.1cm)  LVIDd:         4.20 cm   (3.9-5.9cm)  LVIDs:         3.10 cm  LV Mass Index: 93.6 g/m2  LV % FS        26.2 %    LA VOLUME:  Normal Ranges:  LA Vol A4C:        46.1 ml    (22+/-6mL/m2)  LA Vol A2C:        46.3 ml  LA Vol BP:         46.7 ml  LA Vol Index A4C:  29.4ml/m2  LA Vol Index A2C:  29.5 ml/m2  LA Vol Index BP:   29.7 ml/m2  LA Area A4C:       16.8 cm2  LA Area A2C:       17.0 cm2  LA Major Axis A4C: 5.2 cm  LA Major Axis A2C: 5.3 cm  LA Volume Index:   29.1 ml/m2    RA VOLUME BY A/L METHOD:  Normal Ranges:  RA Area A4C: 16.2 cm2    M-MODE MEASUREMENTS:  Normal Ranges:  Ao Root: 3.30 cm (2.0-3.7cm)  AoV Exc: 1.90 cm (1.5-2.5cm)  LAs:     3.90 cm (2.7-4.0cm)    AORTA MEASUREMENTS:  Normal Ranges:  AoV Exc:     1.90 cm (1.5-2.5cm)  Ao Sinus, d: 3.10 cm (2.1-3.5cm)  Ao STJ, d:   2.50 cm (1.7-3.4cm)  Asc Ao, d:   3.20 cm (2.1-3.4cm)    LV SYSTOLIC FUNCTION BY 2D PLANIMETRY (MOD):  Normal Ranges:  EF-A4C View: 55.7 % (>=55%)  EF-A2C View: 51.8 %  EF-Biplane:  54.0 %    LV DIASTOLIC FUNCTION:  Normal Ranges:  MV Peak E:    0.64 m/s    (0.7-1.2 m/s)  MV Peak A:    0.79 m/s    (0.42-0.7 m/s)  E/A Ratio:    0.82        (1.0-2.2)  MV e'         0.06 m/s    (>8.0)  MV lateral e' 0.06 m/s  MV medial e'  0.05 m/s  MV A Dur:     148.00 msec  E/e' Ratio:   10.22       (<8.0)  LV IVRT:      114 msec    (<100ms)    MITRAL VALVE:  Normal Ranges:  MV DT: 204 msec (150-240msec)    MITRAL INSUFFICIENCY:  Normal Ranges:  MR VTI:  206.00 cm  MR Vmax: 573.00 cm/s    AORTIC VALVE:  Normal Ranges:  AoV Mean PG:             3.0 mmHg (1.7-11.5mmHg)  LVOT Max Juan:            0.96 m/s (<=1.1m/s)  AoV VTI:                 25.70 cm (18-25cm)  LVOT VTI:                21.60 cm  LVOT Diameter:           2.00 cm  (1.8-2.4cm)  AoV Area, VTI:           2.64 cm2 (2.5-5.5cm2)  AoV Dimensionless Index: 0.84    AORTIC INSUFFICIENCY:  AI Vmax:      3.97 m/s  AI Half-time: 494 msec    RIGHT VENTRICLE:  RV 1   3.2 cm  RV 2   2.6 cm  RV 3    4.5 cm  TAPSE: 24.0 mm  RV s'  0.15 m/s    TRICUSPID VALVE/RVSP:  Normal Ranges:  Peak TR Velocity: 4.32 m/s  RV Syst Pressure: 77.6 mmHg (< 30mmHg)  IVC Diam:         1.30 cm      [id number] Prateek Schmid   Electronically signed on 11/15/2022 at 12:22:40 PM         Final     [facility][id number][facility] No results found for this or any previous visit from the past 1825 days.    Cardiac Catheterization:   Adult Cath     [facility], Cath Lab  [address] [place] [address]  Phone [phone] Fax [phone]    Cardiovascular Catheterization Report    Patient Name:     SHAZIA FAJARDO Performing Physician: [id number] Jacinto MD  Study Date:       8/24/2022      Verifying Physician:  Natalie Jacinto MD  MRN/PID:          [id number]       Cardiologist:  Accession/Order#: [id number]      Referring Physician:  [id number] PRATEEK SCHMID  YOB: 1935     Referring Physician:  Gender:           F              Referring Physician:      Study: Left and Right Heart Catheterization      Indications:  SHAZIA FAJARDO is a 87 year old female who presents with atrial fibrillation. Left ventricular dysfunction, with an asymptomatic chest pain assessment. Shortness of breath, systolic dysfunction. Study performed as an urgent cath procedure. Heart failure.    Medical History:  Stress test performed: Yes. CTA performed: Yes. Agatston accessed: No. LVEF Assessed: Yes. LVEF = 20%.    Procedure Description:  After infiltration with 2% Lidocaine, the right radial artery was cannulated with a modified Seldinger technique. Subsequently a 6 Prydeinig sheath was placed retrograde in the right radial artery. The arterial sheath was sized up to 6 Prydeinig. After infiltration of local anesthetic, the right cephalic vein vein was cannulated with a percutaneous technique. A 5 Prydeinig sheath was placed in the vein. Multiple injections of contrast were made into the left and right coronary arteries with  angiograms recorded in multiple projections. Cardiac output was calculated via the Sonya method. After completion of the procedure, the arterial sheath was pulled and a TR Band Radial Compression Device was utilized to obtain patent hemostasis. Post-procedure, the venous sheath was pulled and pressure was applied to the site.    Coronary Angiography:  The coronary circulation is right dominant.    Left Main Coronary Artery:  The left main coronary artery is a normal caliber vessel. The left main arises normally from the left coronary sinus of Valsalva and bifurcates into the LAD and circumflex coronary arteries. The left main coronary artery showed no significant disease or stenosis greater than 30%.    Left Anterior Descending Coronary Artery Distribution:  The left anterior descending coronary artery is a normal caliber vessel. The LAD arises normally from the left main coronary artery. The LAD demonstrated diffuse mild calcification and mild proximal to mid obstruction.    Circumflex Coronary Artery Distribution:  The circumflex coronary artery is a normal caliber vessel. The circumflex arises normally from the left main coronary artery and terminates in the AV groove. The circumflex revealed mild ostial to proximal obstruction.    Right Heart Catheterization:  Cardiac output was calculated via the Sonya method.    LVEDP 20 mmHg, no pullback gradient across LV-Ao  Ao (103 mmHg) 95%  RA 15 mmHg, 50%  RV 50/15 mmHg  PA 50/25 mmHg, 33.3 mmHg, 47%  PCWP Could not wedge the catehter  SVR 2427 cgs  CO 2.9 L/min, CI 1.7 L/min/m2.    Right Coronary Artery Distribution:    The right coronary artery is a normal caliber vessel. The RCA arises normally from the right sinus of Valsalva. The RCA showed no significant disease or stenosis greater than 30% and diffuse mild calcification.    Hemo Personnel:  +--------------------+-------------+  Name                Duty           +--------------------+-------------+  Ophelia,  Gen AYALA        PROC MD 1  +--------------------+-------------+  Jona Jacinto   PROC SCRUB 1  +--------------------+-------------+  Zabrina Scruggs RN  PROC CIRC 1  +--------------------+-------------+  Brielle Wallace RN    PROC RECORD 1  +--------------------+-------------+  Ashlee Davis RN     PROC RECORD 2  +--------------------+-------------+  Elio Macedo RN      PROC NURSE 1  +--------------------+-------------+  Angeles Gutierrez RN  PROC NURSE 2  +--------------------+-------------+      Sedation Time:  +------------------------+------------------+  Sedation Start/End TimesTime                +------------------------+------------------+  End                     8/24/2022 13:41:16  +------------------------+------------------+      Equipment Used:  +---------------------+--------------------------------------------------------+              Date/Time                                             Description  +---------------------+--------------------------------------------------------+  8/24/2022 12:31:37 PM     - Capnoflex LF Oral/Nasal CO2 - Qty: 1                                                               Each Part #: [id number]  +---------------------+--------------------------------------------------------+  8/24/2022 12:31:42 PM    Terumo TR Band Regular - Qty: 1 Each                                                                  Part #: [id number]  +---------------------+--------------------------------------------------------+  8/24/2022 12:32:33 PM    Merit [employer]it Guidewire 3mm J                                          210 cm .035 - Qty: 1 Each Part #: [id number]  +---------------------+--------------------------------------------------------+  8/24/2022 12:32:40 PM    - Erwin Zhang 6 FR x 10cm -                                                         Qty: 1 Each Part #:  [id number]  +---------------------+--------------------------------------------------------+  8/24/2022 12:43:19 PM Terumo Runthrough NS Extra Floppy                                                180cm - Qty: 1 Each Part #: [id number]  +---------------------+--------------------------------------------------------+  8/24/2022 12:43:41 PM  Terumo TERUMO Erwin Slender 5Fr                                10cm (G-1723) - Qty: 1 Each Part #: [id number]  +---------------------+--------------------------------------------------------+  8/24/2022 12:44:00 PM      Arrow Arrow 5 FR 110cm Balloon                              Wedge Pressure Catheter - Qty: 1 Each Part #: [id number]  +---------------------+--------------------------------------------------------+  8/24/2022 12:51:15 PM                   200 300cm wire  +---------------------+--------------------------------------------------------+ 8/24/2022 12:54:17 PM   Navicross 0.035 135cm microcatheter  +---------------------+--------------------------------------------------------+  8/24/2022 12:58:46 PM  Abbott Spartacore 0.014\" x 300cm                                            Guidewire - Qty: 1 Each Part #: 604  +---------------------+--------------------------------------------------------+   8/24/2022 1:05:28 PM    CANWE STUDIOS Scientific V-18 Control                                  Wire Straight 300cm - Qty: 1 Each Part #: [id number]  +---------------------+--------------------------------------------------------+   8/24/2022 1:22:46 PM  Terumo TERUMO Glidesheath Slender 5Fr                                10cm (C-3728) - Qty: 1 Each Part #: [id number]  +---------------------+--------------------------------------------------------+   8/24/2022 1:24:59 PM  Terumo TERUMO Glidesheath Slender 5Fr                                10cm (C-1894) - Qty: 1 Each Part #: " [id number]  +---------------------+--------------------------------------------------------+   8/24/2022 1:26:25 PM Medtronic DXTERITY ANG PIGTAIL 5FR X                                                145cm - Qty: 1 Each Part #: 655  +---------------------+--------------------------------------------------------+   8/24/2022 1:26:36 PM      Medtronic DXTERITY JR 4.0 5FR X                                           100CM - Qty: 1 Each Part #: [id number]  +---------------------+--------------------------------------------------------+   8/24/2022 1:26:52 PM              DXTERITY JL 3.5 5 Citizen of [place]  +---------------------+--------------------------------------------------------+   8/24/2022 1:27:21 PM  Mer[employer] Guidewire 1.5mm J                                           210cm .035 - Qty: 1 Each Part #: [id number]  +---------------------+--------------------------------------------------------+   8/24/2022 1:28:53 PM Medtronic DXTERITY ANG PIGTAIL 5FR X                                                145cm - Qty: 1 Each Part #: 655  +---------------------+--------------------------------------------------------+      Fluoroscopy Time:  +--------------------------+---------+  X-Ray Summary Fluoro Time:17.30 min  +--------------------------+---------+      +----------+--------+  Contrast: Dose:     +----------+--------+  Omnipaque:60.00 ml  +----------+--------+      Hemodynamic Pressures:    +----+----------+---------+-------------+-------------+---+----+-------+-------+  SiteDate Time   Phase  Systolic mmHg  Diastolic  ED MeanA-Wave V-Wave                   Name                    mmHg     mmHmmHg mmHg   mmHg                                                      g                     +----+----------+---------+-------------+-------------+---+----+-------+-------+    PA 8/24/2022 [facility]           43           21     29                    1:08:08  PM                                                          +----+----------+---------+-------------+-------------+---+----+-------+-------+    RV 8/24/2022 [facility]           41            6 14                        1:09:09 PM                                                          +----+----------+---------+-------------+-------------+---+----+-------+-------+    RA 8/24/2022 [facility]                               15     18     21      1:09:32 PM                                                          +----+----------+---------+-------------+-------------+---+----+-------+-------+    LV 8/24/2022 [facility]          142            7 21                        1:31:24 PM                                                          +----+----------+---------+-------------+-------------+---+----+-------+-------+   LVp 8/24/2022 [facility]          144            6 19                        1:31:33 PM                                                          +----+----------+---------+-------------+-------------+---+----+-------+-------+   AOp 8/24/2022 [facility]          145           75    103                    1:31:40 PM                                                          +----+----------+---------+-------------+-------------+---+----+-------+-------+    AO 8/24/2022 [facility]          146           71    102                    1:33:32 PM                                                          +----+----------+---------+-------------+-------------+---+----+-------+-------+        Oxygen Saturation %:  +-----------+----------+------------+  Sample SiteO2 Sat (%)HB (g/100ml)  +-----------+----------+------------+           AO        95        12.7  +-----------+----------+------------+           RA        50        12.7  +-----------+----------+------------+           AO         95        12.7  +-----------+----------+------------+           PA        47        12.7  +-----------+----------+------------+      Cardiac Outputs:  +---------------+------------------+-------+  KEHINDE CO (l/min)KEHINDE CI (l/min/m2)KEHINDE SV  +---------------+------------------+-------+              2.9               1.7   38.0  +---------------+------------------+-------+      Vascular Resistance Calculated Values (Wood Units):  +-----+----+----+----+----+----+----+-------+  PhaseSVR SVRITPR TPRITVR TVRITPR/TVR  +-----+----+----+----+----+----+----+-------+  0    [id number]        +-----+----+----+----+----+----+----+-------+      Complications:  No in-lab complications observed.    Cardiac Cath Transition of Care Summary:  Post Procedure Diagnosis: Non-ischemic cardiomyopathy.  Blood Loss:               Estimated blood loss during the procedure was 20 mls.  Specimens Removed:        Number of specimen(s) removed: none.    ____________________________________________________________________________________  CONCLUSIONS:  1. Non-ischemic cardiomyopathy.  2. Continue guideline directed medical therapy for severe chronic left ventricular systolic dysfunction.    ____________________________________________________________________________________  CPT Codes:  Right & Left Heart Cath w/ventriculography/Coronary angio (RHC)([facility])-88155    ICD 10 Codes:  I50.21-Acute systolic (congestive) heart failure    [id number] Gen Jacinto MD  Performing Physician  Electronically signed by [id number] Gen Jacinto MD on 9/11/2022 at 6:45:44 PM      cc Report to: [id number] PRATEEK SINGH           Final   No results found for this or any previous visit from the past 3650 days.     Cardiac Scoring: No results found for this or any previous visit from the past 1825 days.    AAA : No results found for this or any previous visit from the past 1825 days.    OTHER: No results found for this or any previous visit  from the past 1825 days.    LAST IMAGING RESULTS  MR foot left wo IV contrast  Narrative: Interpreted By:  Aubrey Baker,   STUDY:  MR FOOT LEFT WO IV CONTRAST;      INDICATION:  Signs/Symptoms:Nonhealing wound.      COMPARISON:  Plain film radiographs of the left foot October 9, 2023      ACCESSION NUMBER(S):  [id number]      ORDERING CLINICIAN:  KEON DELATORRE      TECHNIQUE:  Routine multiplanar multisequential MRI left foot without contrast      FINDINGS:  There is generalized subcutaneous edema of the entirety of the  visualized left forefoot from the dorsum of the toes to the distal  ankle.      There is no evidence of a discrete focal fluid collection.      No definite large skin wound seen.      There is no evidence of osteomyelitis.      There is no evidence of fracture.      No marrow replacement lesions seen.      Some mild midfoot osteoarthritis noted. Mild arthrosis of the 1st MTP.      No acute tendinous or ligamentous abnormality.      No evidence of a soft tissue mass.      No sizeable effusions.      No large chondral defects.      Fatty atrophy and edema of the intrinsic foot musculature favored to  represent chronic ischemic myopathy.      Impression: Nonspecific subcutaneous edema of the left foot favoring cellulitis.      No findings to suggest osteomyelitis.      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      Signed by: Aubrey Baker 11/28/2023 5:09 PM  Dictation workstation:   [id number]  Transthoracic Echo (TTE) [place] [id number]       Phone [phone] Fax [phone]    TRANSTHORACIC ECHOCARDIOGRAM REPORT       Patient Name:      SHAZIA TANA Che Physician:    [id number] Wil Leiva MD  Study Date:        11/28/2023           Ordering Provider:    [id number] HESTER                                                                 FELIX  MRN/PID:           [id number]             Fellow:  Accession#:        [id number]         Nurse:  Date of Birth/Age: 1935 / 88      Sonographer:          Laura Carrieparvin                     dinorah RAYGOZA  Gender:            F                    Additional Staff:  Height:            162.56 cm            Admit Date:           11/26/2023  Weight:            60.78 kg             Admission Status:     Inpatient -                                                                Routine  BSA:               1.65 m2              Department Location:  [facility]  Blood Pressure: 97 /71 mmHg    Study Type:    TRANSTHORACIC ECHO (TTE) COMPLETE  Diagnosis/ICD: Acute combined systolic (congestive) and diastolic (congestive)                 heart failure (CHF)-I50.41  Indication:    Congestive Heart Failure  CPT Codes:     Echo Complete w Full Doppler-02228   Study Detail: The following Echo studies were performed: 2D, M-Mode, Doppler and                color flow.       PHYSICIAN INTERPRETATION:  Left Ventricle: Left ventricular systolic function is severely decreased, with an estimated ejection fraction of 10-15%. There is global hypokinesis of the left ventricle with minor regional variations. The left ventricular cavity size is normal. Spectral Doppler shows an impaired relaxation pattern of left ventricular diastolic filling.  Left Atrium: The left atrium is mildly dilated.  Right Ventricle: The right ventricle is mildly enlarged. There is reduced right ventricular systolic function.  Right Atrium: The right atrium is mildly dilated.  Aortic Valve: The aortic valve is trileaflet. There is moderate aortic valve regurgitation. The peak instantaneous gradient of the aortic valve is 2.9 mmHg.  Mitral Valve: The mitral valve is normal in structure. There is mild to moderate mitral valve regurgitation.  Tricuspid Valve: The tricuspid valve is structurally normal. There is moderate to  severe tricuspid regurgitation. The Doppler estimated RVSP is mildly elevated at 41.3 mmHg. Echodensity visualized in the IVC.  Pulmonic Valve: The pulmonic valve is structurally normal. There is mild pulmonic valve regurgitation.  Pericardium: There is no pericardial effusion noted.  Pleural: There is a large bilateral pleural effusion.  Aorta: The aortic root is normal.       CONCLUSIONS:   1. Left ventricular systolic function is severely decreased with a 10-15% estimated ejection fraction.   2. Spectral Doppler shows an impaired relaxation pattern of left ventricular diastolic filling.   3. There is reduced right ventricular systolic function.   4. There is a large pleural effusion.   5. Mild to moderate mitral valve regurgitation.   6. Mildly elevated RVSP.   7. Moderate to severe tricuspid regurgitation.   8. Moderate aortic valve regurgitation.   9. There is global hypokinesis of the left ventricle with minor regional variations.    QUANTITATIVE DATA SUMMARY:  2D MEASUREMENTS:                           Normal Ranges:  Ao Root d:     3.20 cm   (2.0-3.7cm)  LAs:           4.56 cm   (2.7-4.0cm)  IVSd:          0.63 cm   (0.6-1.1cm)  LVPWd:         0.60 cm   (0.6-1.1cm)  LVIDd:         4.53 cm   (3.9-5.9cm)  LVIDs:         4.28 cm  LV Mass Index: 49.8 g/m2  LV % FS        5.5 %    LA VOLUME:                                Normal Ranges:  LA Vol A4C:        61.8 ml    (22+/-6mL/m2)  LA Vol A2C:        85.1 ml  LA Vol BP:         73.0 ml  LA Vol Index A4C:  37.4ml/m2  LA Vol Index A2C:  51.6 ml/m2  LA Vol Index BP:   44.3 ml/m2  LA Area A4C:       21.9 cm2  LA Area A2C:       25.9 cm2  LA Major Axis A4C: 6.6 cm  LA Major Axis A2C: 6.7 cm  LA Vol A4C:        60.5 ml  LA Vol A2C:        87.5 ml    RA VOLUME BY A/L METHOD:                        Normal Ranges:  RA Area A4C: 23.8 cm2    AORTA MEASUREMENTS:                     Normal Ranges:  Ao STJ, d: 2.30 cm (1.7-3.4cm)  Asc Ao, d: 3.50 cm (2.1-3.4cm)    LV SYSTOLIC  FUNCTION BY 2D PLANIMETRY (MOD):                      Normal Ranges:  EF-A4C View: 17.1 % (>=55%)  EF-A2C View: 18.2 %  EF-Biplane:  16.4 %    LV DIASTOLIC FUNCTION:                            Normal Ranges:  MV Peak E:    0.62 m/s    (0.7-1.2 m/s)  MV Peak A:    0.93 m/s    (0.42-0.7 m/s)  E/A Ratio:    0.67        (1.0-2.2)  MV e'         0.02 m/s    (>8.0)  MV lateral e' 0.02 m/s  MV medial e'  0.02 m/s  MV A Dur:     108.47 msec  E/e' Ratio:   31.13       (<8.0)    MITRAL VALVE:                 Normal Ranges:  MV DT: 49 msec (150-240msec)    MITRAL INSUFFICIENCY:                            Normal Ranges:  PISA Radius:  0.5 cm  MR VTI:       132.63 cm  MR Vmax:      392.73 cm/s  MR Alias Juan: 39.3 cm/s  MR Volume:    25.17 ml  MR Flow Rt:   74.55 ml/s  MR EROA:      0.19 cm2    AORTIC VALVE:                          Normal Ranges:  AoV Vmax:      0.85 m/s (<=1.7m/s)  AoV Peak P.9 mmHg (<20mmHg)  LVOT Max Juan:  0.59 m/s (<=1.1m/s)  LVOT VTI:      10.39 cm  LVOT Diameter: 1.96 cm  (1.8-2.4cm)  AoV Area,Vmax: 2.12 cm2 (2.5-4.5cm2)    AORTIC INSUFFICIENCY:  AI Vmax:       3.14 m/s  AI Half-time:  453 msec  AI Decel Time: 1563 msec  AI Decel Rate: 204.64 cm/s2       RIGHT VENTRICLE:  RV Basal 4.36 cm  RV Mid   3.40 cm  RV Major 6.0 cm  TAPSE:   14.8 mm  RV s'    0.13 m/s    TRICUSPID VALVE/RVSP:                              Normal Ranges:  Peak TR Velocity: 2.89 m/s  RV Syst Pressure: 41.3 mmHg (< 30mmHg)  IVC Diam:         1.98 cm  TV e'             0.1 m/s    AORTA:  Asc Ao Diam 3.46 cm       [id number] Wil Leiva MD  Electronically signed on 2023 at 1:00:55 PM       ** Final **  XR chest 1 view  Narrative: Interpreted By:  Daniella Baker,   STUDY:  XR CHEST 1 VIEW;  2023 10:58 am      INDICATION:  Signs/Symptoms:placement of central line.      COMPARISON:  2023      ACCESSION NUMBER(S):  [id number]      ORDERING CLINICIAN:  MITCH PADILLA      FINDINGS:  Cardiac silhouette appears enlarged.  There are atherosclerotic  changes of the aorta.      There is bilateral parenchymal infiltration. There are bilateral  pleural effusions.      Degenerative changes present on the right with tip in the region of  the superior vena cava.      There are degenerative changes of the spine.      COMPARISON OF FINDING:  The catheter was placed in the interval.      Impression: Interval placement of a right jugular catheter. Findings suggestive  of congestive heart failure.      MACRO:  none      Signed by: Daniella Baker 11/28/2023 11:06 AM  Dictation workstation:   [id number]  CARRIE without exercise  Preliminary Cardiology Report                   [place] [address]       Phone [phone] Fax [phone]                 Preliminary Vascular Lab Report     [facility] ANKLE BRACHIAL INDEX (CARRIE) WITHOUT EXERCISE       Patient Name:     SHAZIA Che Physician:  [id number] Maribell Rao MD  Study Date:       11/28/2023     Ordering Provider:  [id number] KEON DELATORRE  MRN/PID:          [id number]       Fellow:  Accession#:       [id number]   Technologist:       Yasmin Alamo RVGISSELL  YOB: 1935     Technologist 2:  Gender:           F              Encounter#:         [id number]  Admission Status: Inpatient      Location Performed: [facility]       Diagnosis/ICD: Peripheral vascular disease, unspecified-I73.9       PRELIMINARY CONCLUSIONS:     Right Lower PVR: No evidence of arterial occlusive disease in the right lower extremity at rest. Triphasic flow is noted in the right common femoral artery, right posterior tibial artery and right dorsalis pedis artery.  Left Lower PVR: No evidence of arterial occlusive disease in the left lower extremity at rest. Biphasic flow is noted in the left posterior tibial artery. Triphasic flow is noted in the left common femoral artery and left dorsalis pedis artery.  Additional Findings:  Technically limited due to  movement, cooperation, pain tolerance to cuffs.       Imaging & Doppler Findings:     RIGHT Lower PVR                Pressures Ratios  Right Posterior Tibial (Ankle) 108 mmHg  1.11  Right Dorsalis Pedis (Ankle)   104 mmHg  1.07  Right Digit (Great Toe)        65 mmHg   0.67          LEFT Lower PVR                Pressures Ratios  Left Posterior Tibial (Ankle) 109 mmHg  1.12  Left Dorsalis Pedis (Ankle)   111 mmHg  1.14  Left Digit (Great Toe)        45 mmHg   0.46                             Left  Brachial Pressure 97 mmHg          VASCULAR PRELIMINARY REPORT  completed by Yasmin Alamo RVT on 11/28/2023 at 7:44:46 AM       ** Final **  XR chest 1 view  Narrative: Interpreted By:  Alfredito Rivera,   STUDY:  XR CHEST 1 VIEW;  11/27/2023 11:55 pm      INDICATION:  Signs/Symptoms:CHF.      COMPARISON:  08/21/2022      ACCESSION NUMBER(S):  [id number]      ORDERING CLINICIAN:  KEON DELATORRE      FINDINGS:  The heart is enlarged The pulmonary vasculature is congested  centrally and indistinct peripherally, with interlobular septal  thickening and indistinct mid to lower lung consolidative opacities  consistent with pulmonary edema. There are large bilateral pleural  effusions.      Impression: Severe pulmonary edema and large bilateral pleural effusions.          MACRO:  None.      Signed by: Alfredito Rivera 11/28/2023 2:16 AM  Dictation workstation:   [id number]        Nigel Schmid DO, FACSONAM, FACOI  11/29/2023  9:25 AM

## 2023-11-29 NOTE — PROGRESS NOTES
Critical Care Daily Progress        Subjective   Patient is a 88 y.o. female admitted on 11/26/2023  2:49 PM with the following indication(s) for ICU care: Septic shock requiring the Levophed.     Interval History: Patient has been off Levophed at 2 PM yesterday 11/20/2023.  Patient continued to be disoriented, which has not been a chronic thing for the last 3 weeks as per the family.       Scheduled Medications:   amiodarone, 200 mg, oral, Daily  [Held by provider] apixaban, 2.5 mg, oral, BID  [Held by provider] carvedilol, 6.25 mg, oral, BID  clindamycin, 900 mg, intravenous, q8h  furosemide, 80 mg, intravenous, BID  magnesium oxide, 400 mg, oral, Daily  meropenem, 2 g, intravenous, q12h  mirtazapine, 7.5 mg, oral, Nightly  polyethylene glycol, 17 g, oral, Daily  vancomycin, 750 mg, intravenous, q24h         Continuous Medications:   norepinephrine in sodium chloride 0.9 %, 0.05-1 mcg/kg/min, Last Rate: Stopped (11/28/23 1430)         PRN Medications:   PRN medications: acetaminophen **OR** acetaminophen **OR** acetaminophen, ondansetron **OR** ondansetron    Objective   Vitals:  Most Recent:  Vitals:    11/29/23 1100   BP: 96/58   Pulse: 85   Resp: 25   Temp:    SpO2: 98%       24hr Min/Max:  Temp  Min: 35.2 °C (95.4 °F)  Max: 36 °C (96.8 °F)  Pulse  Min: 80  Max: 105  BP  Min: 80/60  Max: 119/77  Resp  Min: 9  Max: 34  SpO2  Min: 54 %  Max: 100 %    LDA:  CVC 11/28/23 Triple lumen Right Internal jugular (Active)   Placement Date/Time: 11/28/23 (c) 1010   Hand Hygiene Performed Prior to CVC Insertion: Yes  Site Prep: Chlorhexidine   All 5 Sterile Barriers Used (Gloves, Gown, Cap, Mask, Large Sterile Drape): Yes  Local Anesthetic: Injectable  Lumen Type: Triple l...   Number of days: 1         Vent settings:       Hemodynamic parameters for last 24 hours:       I/O:    Intake/Output Summary (Last 24 hours) at 11/29/2023 1130  Last data filed at 11/29/2023 0633  Gross per 24 hour   Intake 1625.76 ml   Output 569 ml    Net 1056.76 ml       Physical Exam:   Physical Exam   Vitals reviewed.   Constitutional:       Appearance: She is ill-appearing.   HENT:      Head: Normocephalic and atraumatic.   Eyes:      Conjunctiva/sclera: Conjunctivae normal.      Pupils: Pupils are equal, round, and reactive to light.   Cardiovascular:      Rate and Rhythm: Normal rate. Rhythm irregular.   Pulmonary:      Effort: Pulmonary effort is normal.      Breath sounds: Rales present.   Abdominal:      General: Abdomen is flat.      Palpations: Abdomen is soft.   Musculoskeletal:      Comments: Generalized weakness   Skin:     General: Skin is warm and dry.   Neurological:      Mental Status: She is disoriented.   Psychiatric:      Comments: Lethargic     Lab/Radiology/Diagnostic Review:  Results for orders placed or performed during the hospital encounter of 11/26/23 (from the past 24 hour(s))   Transthoracic Echo (TTE) Complete   Result Value Ref Range    LVOT diam 1.96     LV biplane EF 16     MV E/A ratio 0.67     MV avg E/e' ratio 31.13     Tricuspid annular plane systolic excursion 1.5     LA vol index A/L 44.3     AV pk anthony 0.85     RV free wall pk S' 12.88     RVSP 41.3     LVIDd 4.53     Aortic Valve Area by Continuity of Peak Velocity 2.12     AV pk grad 2.9     LV A4C EF 17.1    CBC   Result Value Ref Range    WBC 7.2 4.4 - 11.3 x10*3/uL    nRBC 0.0 0.0 - 0.0 /100 WBCs    RBC 4.44 4.00 - 5.20 x10*6/uL    Hemoglobin 11.3 (L) 12.0 - 16.0 g/dL    Hematocrit 36.2 36.0 - 46.0 %    MCV 82 80 - 100 fL    MCH 25.5 (L) 26.0 - 34.0 pg    MCHC 31.2 (L) 32.0 - 36.0 g/dL    RDW 21.2 (H) 11.5 - 14.5 %    Platelets 219 150 - 450 x10*3/uL   Basic Metabolic Panel   Result Value Ref Range    Glucose 71 (L) 74 - 99 mg/dL    Sodium 135 (L) 136 - 145 mmol/L    Potassium 3.5 3.5 - 5.3 mmol/L    Chloride 100 98 - 107 mmol/L    Bicarbonate 25 21 - 32 mmol/L    Anion Gap 14 10 - 20 mmol/L    Urea Nitrogen 29 (H) 6 - 23 mg/dL    Creatinine 1.12 (H) 0.50 - 1.05  mg/dL    eGFR 48 (L) >60 mL/min/1.73m*2    Calcium 8.1 (L) 8.6 - 10.3 mg/dL     MR foot left wo IV contrast    Result Date: 11/28/2023  Interpreted By:  Aubrey Baker, STUDY: MR FOOT LEFT WO IV CONTRAST;   INDICATION: Signs/Symptoms:Nonhealing wound.   COMPARISON: Plain film radiographs of the left foot October 9, 2023   ACCESSION NUMBER(S): [id number]   ORDERING CLINICIAN: KEON DELATORRE   TECHNIQUE: Routine multiplanar multisequential MRI left foot without contrast   FINDINGS: There is generalized subcutaneous edema of the entirety of the visualized left forefoot from the dorsum of the toes to the distal ankle.   There is no evidence of a discrete focal fluid collection.   No definite large skin wound seen.   There is no evidence of osteomyelitis.   There is no evidence of fracture.   No marrow replacement lesions seen.   Some mild midfoot osteoarthritis noted. Mild arthrosis of the 1st MTP.   No acute tendinous or ligamentous abnormality.   No evidence of a soft tissue mass.   No sizeable effusions.   No large chondral defects.   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       Nonspecific subcutaneous edema of the left foot favoring cellulitis.   No findings to suggest osteomyelitis.   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   Signed by: Aubrey Baker 11/28/2023 5:09 PM Dictation workstation:   [id number]    Transthoracic Echo (TTE) Complete    Result Date: 11/28/2023              [place] [id number]      Phone [phone] Fax [phone] TRANSTHORACIC ECHOCARDIOGRAM REPORT  Patient Name:      SHAZIA Che Physician:    [id number] Wil Leiva MD Study Date:        11/28/2023           Ordering Provider:    [id number] MIR WASHINGTON MRN/PID:            [id number]             Fellow: Accession#:        [id number]         Nurse: Date of Birth/Age: 1935 / 88      Sonographer:          Laura copeland                                      [facility] Gender:            F                    Additional Staff: Height:            162.56 cm            Admit Date:           11/26/2023 Weight:            60.78 kg             Admission Status:     Inpatient -                                                               Routine BSA:               1.65 m2              Department Location:  [facility] Blood Pressure: 97 /71 mmHg Study Type:    TRANSTHORACIC ECHO (TTE) COMPLETE Diagnosis/ICD: Acute combined systolic (congestive) and diastolic (congestive)                heart failure (CHF)-I50.41 Indication:    Congestive Heart Failure CPT Codes:     Echo Complete w Full Doppler-71184  Study Detail: The following Echo studies were performed: 2D, M-Mode, Doppler and               color flow.  PHYSICIAN INTERPRETATION: Left Ventricle: Left ventricular systolic function is severely decreased, with an estimated ejection fraction of 10-15%. There is global hypokinesis of the left ventricle with minor regional variations. The left ventricular cavity size is normal. Spectral Doppler shows an impaired relaxation pattern of left ventricular diastolic filling. Left Atrium: The left atrium is mildly dilated. Right Ventricle: The right ventricle is mildly enlarged. There is reduced right ventricular systolic function. Right Atrium: The right atrium is mildly dilated. Aortic Valve: The aortic valve is trileaflet. There is moderate aortic valve regurgitation. The peak instantaneous gradient of the aortic valve is 2.9 mmHg. Mitral Valve: The mitral valve is normal in structure. There is mild to moderate mitral valve regurgitation. Tricuspid Valve: The tricuspid valve is structurally normal. There is moderate to severe tricuspid regurgitation. The Doppler estimated RVSP  is mildly elevated at 41.3 mmHg. Echodensity visualized in the IVC. Pulmonic Valve: The pulmonic valve is structurally normal. There is mild pulmonic valve regurgitation. Pericardium: There is no pericardial effusion noted. Pleural: There is a large bilateral pleural effusion. Aorta: The aortic root is normal.  CONCLUSIONS:  1. Left ventricular systolic function is severely decreased with a 10-15% estimated ejection fraction.  2. Spectral Doppler shows an impaired relaxation pattern of left ventricular diastolic filling.  3. There is reduced right ventricular systolic function.  4. There is a large pleural effusion.  5. Mild to moderate mitral valve regurgitation.  6. Mildly elevated RVSP.  7. Moderate to severe tricuspid regurgitation.  8. Moderate aortic valve regurgitation.  9. There is global hypokinesis of the left ventricle with minor regional variations. QUANTITATIVE DATA SUMMARY: 2D MEASUREMENTS:                          Normal Ranges: Ao Root d:     3.20 cm   (2.0-3.7cm) LAs:           4.56 cm   (2.7-4.0cm) IVSd:          0.63 cm   (0.6-1.1cm) LVPWd:         0.60 cm   (0.6-1.1cm) LVIDd:         4.53 cm   (3.9-5.9cm) LVIDs:         4.28 cm LV Mass Index: 49.8 g/m2 LV % FS        5.5 % LA VOLUME:                               Normal Ranges: LA Vol A4C:        61.8 ml    (22+/-6mL/m2) LA Vol A2C:        85.1 ml LA Vol BP:         73.0 ml LA Vol Index A4C:  37.4ml/m2 LA Vol Index A2C:  51.6 ml/m2 LA Vol Index BP:   44.3 ml/m2 LA Area A4C:       21.9 cm2 LA Area A2C:       25.9 cm2 LA Major Axis A4C: 6.6 cm LA Major Axis A2C: 6.7 cm LA Vol A4C:        60.5 ml LA Vol A2C:        87.5 ml RA VOLUME BY A/L METHOD:                       Normal Ranges: RA Area A4C: 23.8 cm2 AORTA MEASUREMENTS:                    Normal Ranges: Ao STJ, d: 2.30 cm (1.7-3.4cm) Asc Ao, d: 3.50 cm (2.1-3.4cm) LV SYSTOLIC FUNCTION BY 2D PLANIMETRY (MOD):                     Normal Ranges: EF-A4C View: 17.1 % (>=55%) EF-A2C View: 18.2 %  EF-Biplane:  16.4 % LV DIASTOLIC FUNCTION:                           Normal Ranges: MV Peak E:    0.62 m/s    (0.7-1.2 m/s) MV Peak A:    0.93 m/s    (0.42-0.7 m/s) E/A Ratio:    0.67        (1.0-2.2) MV e'         0.02 m/s    (>8.0) MV lateral e' 0.02 m/s MV medial e'  0.02 m/s MV A Dur:     108.47 msec E/e' Ratio:   31.13       (<8.0) MITRAL VALVE:                Normal Ranges: MV DT: 49 msec (150-240msec) MITRAL INSUFFICIENCY:                           Normal Ranges: PISA Radius:  0.5 cm MR VTI:       132.63 cm MR Vmax:      392.73 cm/s MR Alias Juan: 39.3 cm/s MR Volume:    25.17 ml MR Flow Rt:   74.55 ml/s MR EROA:      0.19 cm2 AORTIC VALVE:                         Normal Ranges: AoV Vmax:      0.85 m/s (<=1.7m/s) AoV Peak P.9 mmHg (<20mmHg) LVOT Max Juan:  0.59 m/s (<=1.1m/s) LVOT VTI:      10.39 cm LVOT Diameter: 1.96 cm  (1.8-2.4cm) AoV Area,Vmax: 2.12 cm2 (2.5-4.5cm2) AORTIC INSUFFICIENCY: AI Vmax:       3.14 m/s AI Half-time:  453 msec AI Decel Time: 1563 msec AI Decel Rate: 204.64 cm/s2  RIGHT VENTRICLE: RV Basal 4.36 cm RV Mid   3.40 cm RV Major 6.0 cm TAPSE:   14.8 mm RV s'    0.13 m/s TRICUSPID VALVE/RVSP:                             Normal Ranges: Peak TR Velocity: 2.89 m/s RV Syst Pressure: 41.3 mmHg (< 30mmHg) IVC Diam:         1.98 cm TV e'             0.1 m/s AORTA: Asc Ao Diam 3.46 cm  [id number] Wil Leiva MD Electronically signed on 2023 at 1:00:55 PM  ** Final **     XR chest 1 view    Result Date: 2023  Interpreted By:  Daniella Baker, STUDY: XR CHEST 1 VIEW;  2023 10:58 am   INDICATION: Signs/Symptoms:placement of central line.   COMPARISON: 2023   ACCESSION NUMBER(S): [id number]   ORDERING CLINICIAN: MITCH PADILLA   FINDINGS: Cardiac silhouette appears enlarged. There are atherosclerotic changes of the aorta.   There is bilateral parenchymal infiltration. There are bilateral pleural effusions.   Degenerative changes present on the right with tip in the region of  the superior vena cava.   There are degenerative changes of the spine.   COMPARISON OF FINDING: The catheter was placed in the interval.       Interval placement of a right jugular catheter. Findings suggestive of congestive heart failure.   MACRO: none   Signed by: Daniella Baker 11/28/2023 11:06 AM Dictation workstation:   [id number]    CARRIE without exercise    Result Date: 11/28/2023  Preliminary Cardiology Report              [place] [id number]      Phone [phone] Fax [phone]            Preliminary Vascular Lab Report  [facility] ANKLE BRACHIAL INDEX (CARRIE) WITHOUT EXERCISE  Patient Name:     SHAZIA TANA Che Physician:  [id number] Maribell Rao MD Study Date:       11/28/2023     Ordering Provider:  [id number] KEON DELATORRE MRN/PID:          [id number]       Fellow: Accession#:       [id number]   Technologist:       Yasmin Alamo RVT YOB: 1935     Technologist 2: Gender:           F              Encounter#:         [id number] Admission Status: Inpatient      Location Performed: [facility]  Diagnosis/ICD: Peripheral vascular disease, unspecified-I73.9  PRELIMINARY CONCLUSIONS:  Right Lower PVR: No evidence of arterial occlusive disease in the right lower extremity at rest. Triphasic flow is noted in the right common femoral artery, right posterior tibial artery and right dorsalis pedis artery. Left Lower PVR: No evidence of arterial occlusive disease in the left lower extremity at rest. Biphasic flow is noted in the left posterior tibial artery. Triphasic flow is noted in the left common femoral artery and left dorsalis pedis artery. Additional Findings: Technically limited due to movement, cooperation, pain tolerance to cuffs.  Imaging & Doppler Findings:  RIGHT Lower PVR                Pressures Ratios Right Posterior Tibial (Ankle) 108 mmHg  1.11 Right Dorsalis Pedis (Ankle)   104 mmHg  1.07 Right Digit (Great Toe)        65  mmHg   0.67   LEFT Lower PVR                Pressures Ratios Left Posterior Tibial (Ankle) 109 mmHg  1.12 Left Dorsalis Pedis (Ankle)   111 mmHg  1.14 Left Digit (Great Toe)        45 mmHg   0.46                      Left Brachial Pressure 97 mmHg   VASCULAR PRELIMINARY REPORT completed by Yasmin Alamo RVT on 11/28/2023 at 7:44:46 AM  ** Final **     XR chest 1 view    Result Date: 11/28/2023  Interpreted By:  Alfredito Rivera, STUDY: XR CHEST 1 VIEW;  11/27/2023 11:55 pm   INDICATION: Signs/Symptoms:CHF.   COMPARISON: 08/21/2022   ACCESSION NUMBER(S): [id number]   ORDERING CLINICIAN: KEON DELATORRE   FINDINGS: The heart is enlarged The pulmonary vasculature is congested centrally and indistinct peripherally, with interlobular septal thickening and indistinct mid to lower lung consolidative opacities consistent with pulmonary edema. There are large bilateral pleural effusions.       Severe pulmonary edema and large bilateral pleural effusions.     MACRO: None.   Signed by: Alfredito Rivera 11/28/2023 2:16 AM Dictation workstation:   [id number]    CT angio lower extremity left w and or wo IV contrast    Result Date: 11/26/2023  INDICATION:  Multiple left foot wounds with purple discoloration and cold foot.  Assess for arterial occlusion. TECHNIQUE:  CTA images were obtained through the left lower extremity without and with intravenous contrast.   Omnipaque 350, 100 mL was administered intravenously.  Images are reviewed and processed at a workstation according to the CT angiogram protocol with 3-D and/or MIP post processing imaging generated. Automated mA/kV exposure control was utilized and patient examination was performed in strict accordance with principles of ALARA. COMPARISON:  US venous left lower extremity 10/09/2023.  XR left foot 10/09/2023. FINDINGS:  Limited imaging of the abdomen demonstrates an IVC filter.  There is extensive atherosclerotic disease and mural thrombus in the abdominal aorta there  is a mild stenosis of the inferior mesenteric artery.  The left renal artery is not fully evaluated but does not appear to have flow limiting stenosis.  There is no evidence of abdominal aortic aneurysmal disease. There is colonic diverticulosis without evidence of diverticulitis. . This been previous hysterectomy.  Sclerotic lesion is seen in the right iliac bones.  Plain film radiography is recommended.  Clinical correlation is advised. On the right: Common iliac artery is patent.  The external iliac artery is patent.  The right common femoral artery is not visualized remain in the right lower extremity was not studied. On the left: Common iliac artery is patent.  The external iliac artery is patent.  Common femoral artery is patent.  The superficial femoral artery is patent.  Popliteal artery is patent. Anterior tibial artery, tibioperoneal trunk, posterior tibial artery, peroneal artery, dorsalis pedis artery and pedal posterior tibial artery are patent. Soft tissues of the left lower extremity are diffusely thickened consistent with advanced edema.  The musculature is poorly defined due to the edema which extends into muscular compartments.  Underlying cellulitis is not excluded.  There is a left knee joint effusion. Degenerative changes are noted at the left knee.    1. IVC filter in place. 2. Extensive atherosclerotic disease of the abdominal aorta without evidence of aneurysmal disease. 3. Mild stenosis of the inferior mesenteric artery.  4. Extensive cellulitis or soft tissue edema involving the entire left lower extremity. On the right: 1.  No evidence of iliac artery stenosis. 2.  The right common femoral artery was not studied. On the left: 1.  Cellulitis or soft tissue edema involving the entire left lower extremity. 2.  No evidence of arterial occlusion. Signed by Alfredito Artis M.D.                Malnutrition Diagnosis Status: New  Malnutrition Diagnosis: Moderate malnutrition related to chronic  disease or condition  As Evidenced by: moderate muslce and fat losses, energy intakes <75% estimated needs x 1 month  I agree with the dietitian's malnutrition diagnosis.      Assessment/Plan   Principal Problem:    Shock (CMS/HCC)  Active Problems:    Weakness    Acute systolic heart failure (CMS/HCC)    Paroxysmal atrial fibrillation (CMS/HCC)    Cellulitis of left leg    Aspiration into airway    History of non-Hodgkin's lymphoma    History of DVT of lower extremity    Patricia Jewell is a 88 y.o. female with CHF, atrial fibrillation on Eliquis, hypertension, history of non-Hodgkin's lymphoma in remission since 2005, history of right lower extremity blood clot s/p IVC filter was transferred to the ICU because of shock requiring vasopressors.       Septic shock secondary to cellulitis and high risk of aspiration  11/28/2023: This morning when I I was consulted she was on phenylephrine.   I changed from phenylephrine to Levophed to avoid hypervolemia and congestive heart failure exacerbation patient.    Could not use 30 cc/kg IV fluids because of CHF exacerbation  Placed a central line.  Please refer to procedure note  Titrate Levophed to maintain MAP more than 60  Held Lasix drip and Coreg because of hypotension  Continue vancomycin, escalated from a cefepime to Zosyn for anaerobic coverage  11/29/2023:Patient has been off Levophed at 2 PM yesterday 11/20/2023.    ID escalated antibiotic from Zosyn to meropenem and clindamycin  Blood cultures negative till date   Wound culture abundant mixed gram-positive and gram-negative bacteria  MRSA PCR negative     2.  Congestive heart failure exacerbation  11/28/2023: Holding on Lasix because of high-dose requirement of vasopressors  Will resume Lasix once blood pressure is better controlled  11/29/2023: Started the patient on IV Lasix 80 mg twice daily     3.  Atrial fibrillation with RVR  Continue Eliquis  Holding Coreg because of hypotension  Maintain heart rate less than  120     4.  High risk of aspiration  Consulted speech therapy for evaluation     5.  Acute encephalopathy probably secondary to septic shock  Patient is alert oriented x 2, does not have a capacity to make medical decisions.    Septic shock management as above  11/29/2023: Patient continued to be disoriented, which has not been a chronic thing for the last 3 weeks as per the family.   As the patient CODE STATUS changed to DNR/DNI with no escalation of care after the palliative care meeting, no significant benefit of getting a CT head at this time     6.  Counseling regarding advance care directives and goals of care  11/28/2023: Patient is alert oriented x 2, does not have a capacity to make medical decisions.    I had extensive discussion with the patient daughter and grandson who are at bedside.    As per the patient daughter, grandson is a medical power of  for the patient.   Explained the difference between full code versus DNR  Family wants to continue full code for now  Palliative care consulted  11/29/2023: Patient is changed to DNR/DNI with no escalation of care as per palliative care after the palliative care meeting  I met the family updated overall condition of the patient and answered all the questions    I spent 30 minutes of cumulative critical care time with the patient, not including procedure time.  Greater than 50% of that time was spent in the direct collaboration and or coordination of care of the patient.   Will transfer the patient to [facility] with telemetry.      Dragon dictation software was used to dictate this note and thus there may be minor errors in translation/transcription including garbled speech or misspellings. Please contact for clarification if needed.   Oziel Hassan MD

## 2023-11-29 NOTE — NURSING NOTE
"  0035 Pt remains confused. Difficulty to direct. Pt appears to be anxious and  Pt is frequently trying to get out of bed as \" want to get out of house.\" Unable to direct at times.  Pt pulled out of fishman with no signs of bleeding. Trying to calm her down with repositioning, warm blanket, minimizing stimulations, and music/TV.  Pt reports of urgency to void. Bladder scan shows 0 -40s ml. DTR at bedside. Continue monitoring.     0205 All non-pharm interventions are very helpful. Pt appears to less anxious. Pt is direct able. DTR at bedside.             Update  Dr. Arora that pt pulled out of fishman catheter. He advises to bladder scan again around 0400 and update him . Will follow up.     0600 Update Dr. Arora that bladder scan done at 0545 shows 30s-50sml urine  in the bladder. Order to hold off fishman insertion for now. Will follow up.   "

## 2023-11-29 NOTE — CARE PLAN
Problem: Skin  Goal: Decreased wound size/increased tissue granulation at next dressing change  Outcome: Progressing  Flowsheets (Taken 11/29/2023 0434)  Decreased wound size/increased tissue granulation at next dressing change:   Utilize specialty bed per algorithm   Promote sleep for wound healing   Protective dressings over bony prominences  Goal: Participates in plan/prevention/treatment measures  Outcome: Progressing  Flowsheets (Taken 11/29/2023 0434)  Participates in plan/prevention/treatment measures:   Increase activity/out of bed for meals   Discuss with provider PT/OT consult   Elevate heels  Goal: Promote/optimize nutrition  Outcome: Progressing  Flowsheets (Taken 11/29/2023 0434)  Promote/optimize nutrition:   Offer water/supplements/favorite foods   Assist with feeding   Monitor/record intake including meals   Reassess MST if dietician not consulted   Consume > 50% meals/supplements  Goal: Promote skin healing  Outcome: Progressing  Flowsheets (Taken 11/29/2023 0434)  Promote skin healing:   Turn/reposition every 2 hours/use positioning/transfer devices   Rotate device position/do not position patient on device   Protective dressings over bony prominences   Ensure correct size (line/device) and apply per  instructions   Assess skin/pad under line(s)/device(s)

## 2023-11-29 NOTE — PROGRESS NOTES
Occupational Therapy                 Therapy Communication Note    Patient Name: Patricia Jewell  MRN: [id number]  Today's Date: 11/29/2023     Discipline: Occupational Therapy    Missed Visit Reason: Missed Visit Reason: Other (Comment) (Patient placed on medical hold;)    Missed Time: Attempt    Comment:

## 2023-11-29 NOTE — PROGRESS NOTES
Patricia Jewell is a 88 y.o. female on day 3 of admission presenting with Shock (CMS/HCC).      Subjective   Patricia Jewell is a 88 y.o. female with CHF, atrial fibrillation on Eliquis, hypertension, history of non-Hodgkin's lymphoma in remission since 2005, history of right lower extremity blood clot s/p IVC filter who presented to the emergency department 11/26/2023 with complaints of erythema, swelling, purulence from left lower extremity wound. Apparently patient has had a wound on her left lower extremity since approximately mid October.  She has been seen for this wound multiple times by both emergency department and PCP, has completed 2 rounds of Keflex prior to this.  Daughter at bedside reports they had been doing local wound care at home with Epsom salt soaks and Neosporin however due to the swelling in the legs it appears she had a wound open on the top of her foot sometime in the last 1 month. 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  Nocturinst notified of hypotension 78/48 on 11/27/23. Coreg held, patient moved to ICU and started on pressors as could not give full 30ml/kg sepsis bolus given acute heart failure and volume overload. Lactate 3.8-> 3.3-->4.7-->3.3-->1.7. Blood cultures negative x2 days. CARRIE negative. MRI with cellulitis, no OM. Echo: EF 10-15%, +DD, reduced RV systolic function, mod-severe TR, global hypokinesis LV    W cx with abundant mixed bacteria     11/29/23: Confused overnight and pulled out her fishman. Remains with slight tachypnea 20's. BP 89/60 this morning. Cr stable at 1.12. MRSA nares negative. Family met with pall care yesterday, wish for DNR/DNI, no escalation of care but continue current care until [facility] meeting on Friday, daughter states she is hopeful for home with  hospice at that time.          Review of Systems   Unable to perform ROS: Mental status change          Objective     Last Recorded Vitals  BP 96/58 (Patient Position: Lying)   Pulse 85   Temp 34.8 °C (94.6 °F) (Temporal)   Resp 25   Wt 62.9 kg (138 lb 10.7 oz)   SpO2 98%     Image Results  Transthoracic Echo (TTE) Complete    Result Date: 11/28/2023              [place] [address]      Phone [phone] Fax [phone] TRANSTHORACIC ECHOCARDIOGRAM REPORT  Patient Name:      SHAZIA FAJARDO       Yane Physician:    [id number] Wil Leiva MD Study Date:        11/28/2023           Ordering Provider:    [id number] MIR WASHINGTON MRN/PID:           [id number]             Fellow: Accession#:        [id number]         Nurse: Date of Birth/Age: 1935 / 88      Sonographer:          Laura copeland RDCS Gender:            F                    Additional Staff: Height:            162.56 cm            Admit Date:           11/26/2023 Weight:            60.78 kg             Admission Status:     Inpatient -                                                               Routine BSA:               1.65 m2              Department Location:  [facility] Blood Pressure: 97 /71 mmHg Study Type:    TRANSTHORACIC ECHO (TTE) COMPLETE Diagnosis/ICD: Acute combined systolic (congestive) and diastolic (congestive)                heart failure (CHF)-I50.41 Indication:    Congestive Heart Failure CPT Codes:     Echo Complete w Full Doppler-[id number]  Study Detail: The following Echo studies were performed: 2D, M-Mode, Doppler and               color flow.  PHYSICIAN INTERPRETATION: Left Ventricle: Left ventricular systolic function is severely decreased, with an estimated ejection fraction of 10-15%. There  is global hypokinesis of the left ventricle with minor regional variations. The left ventricular cavity size is normal. Spectral Doppler shows an impaired relaxation pattern of left ventricular diastolic filling. Left Atrium: The left atrium is mildly dilated. Right Ventricle: The right ventricle is mildly enlarged. There is reduced right ventricular systolic function. Right Atrium: The right atrium is mildly dilated. Aortic Valve: The aortic valve is trileaflet. There is moderate aortic valve regurgitation. The peak instantaneous gradient of the aortic valve is 2.9 mmHg. Mitral Valve: The mitral valve is normal in structure. There is mild to moderate mitral valve regurgitation. Tricuspid Valve: The tricuspid valve is structurally normal. There is moderate to severe tricuspid regurgitation. The Doppler estimated RVSP is mildly elevated at 41.3 mmHg. Echodensity visualized in the IVC. Pulmonic Valve: The pulmonic valve is structurally normal. There is mild pulmonic valve regurgitation. Pericardium: There is no pericardial effusion noted. Pleural: There is a large bilateral pleural effusion. Aorta: The aortic root is normal.  CONCLUSIONS:  1. Left ventricular systolic function is severely decreased with a 10-15% estimated ejection fraction.  2. Spectral Doppler shows an impaired relaxation pattern of left ventricular diastolic filling.  3. There is reduced right ventricular systolic function.  4. There is a large pleural effusion.  5. Mild to moderate mitral valve regurgitation.  6. Mildly elevated RVSP.  7. Moderate to severe tricuspid regurgitation.  8. Moderate aortic valve regurgitation.  9. There is global hypokinesis of the left ventricle with minor regional variations. QUANTITATIVE DATA SUMMARY: 2D MEASUREMENTS:                          Normal Ranges: Ao Root d:     3.20 cm   (2.0-3.7cm) LAs:           4.56 cm   (2.7-4.0cm) IVSd:          0.63 cm   (0.6-1.1cm) LVPWd:         0.60 cm   (0.6-1.1cm) LVIDd:          4.53 cm   (3.9-5.9cm) LVIDs:         4.28 cm LV Mass Index: 49.8 g/m2 LV % FS        5.5 % LA VOLUME:                               Normal Ranges: LA Vol A4C:        61.8 ml    (22+/-6mL/m2) LA Vol A2C:        85.1 ml LA Vol BP:         73.0 ml LA Vol Index A4C:  37.4ml/m2 LA Vol Index A2C:  51.6 ml/m2 LA Vol Index BP:   44.3 ml/m2 LA Area A4C:       21.9 cm2 LA Area A2C:       25.9 cm2 LA Major Axis A4C: 6.6 cm LA Major Axis A2C: 6.7 cm LA Vol A4C:        60.5 ml LA Vol A2C:        87.5 ml RA VOLUME BY A/L METHOD:                       Normal Ranges: RA Area A4C: 23.8 cm2 AORTA MEASUREMENTS:                    Normal Ranges: Ao STJ, d: 2.30 cm (1.7-3.4cm) Asc Ao, d: 3.50 cm (2.1-3.4cm) LV SYSTOLIC FUNCTION BY 2D PLANIMETRY (MOD):                     Normal Ranges: EF-A4C View: 17.1 % (>=55%) EF-A2C View: 18.2 % EF-Biplane:  16.4 % LV DIASTOLIC FUNCTION:                           Normal Ranges: MV Peak E:    0.62 m/s    (0.7-1.2 m/s) MV Peak A:    0.93 m/s    (0.42-0.7 m/s) E/A Ratio:    0.67        (1.0-2.2) MV e'         0.02 m/s    (>8.0) MV lateral e' 0.02 m/s MV medial e'  0.02 m/s MV A Dur:     108.47 msec E/e' Ratio:   31.13       (<8.0) MITRAL VALVE:                Normal Ranges: MV DT: 49 msec (150-240msec) MITRAL INSUFFICIENCY:                           Normal Ranges: PISA Radius:  0.5 cm MR VTI:       132.63 cm MR Vmax:      392.73 cm/s MR Alias Juan: 39.3 cm/s MR Volume:    25.17 ml MR Flow Rt:   74.55 ml/s MR EROA:      0.19 cm2 AORTIC VALVE:                         Normal Ranges: AoV Vmax:      0.85 m/s (<=1.7m/s) AoV Peak P.9 mmHg (<20mmHg) LVOT Max Juan:  0.59 m/s (<=1.1m/s) LVOT VTI:      10.39 cm LVOT Diameter: 1.96 cm  (1.8-2.4cm) AoV Area,Vmax: 2.12 cm2 (2.5-4.5cm2) AORTIC INSUFFICIENCY: AI Vmax:       3.14 m/s AI Half-time:  453 msec AI Decel Time: 1563 msec AI Decel Rate: 204.64 cm/s2  RIGHT VENTRICLE: RV Basal 4.36 cm RV Mid   3.40 cm RV Major 6.0 cm TAPSE:   14.8 mm RV s'    0.13 m/s  TRICUSPID VALVE/RVSP:                             Normal Ranges: Peak TR Velocity: 2.89 m/s RV Syst Pressure: 41.3 mmHg (< 30mmHg) IVC Diam:         1.98 cm TV e'             0.1 m/s AORTA: Asc Ao Diam 3.46 cm  [id number] Wil Leiva MD Electronically signed on 11/28/2023 at 1:00:55 PM  ** Final **     XR chest 1 view    Result Date: 11/28/2023  Interpreted By:  Daniella Baker, STUDY: XR CHEST 1 VIEW;  11/28/2023 10:58 am   INDICATION: Signs/Symptoms:placement of central line.   COMPARISON: 11/27/2023   ACCESSION NUMBER(S): [id number]   ORDERING CLINICIAN: MITCH PADILLA   FINDINGS: Cardiac silhouette appears enlarged. There are atherosclerotic changes of the aorta.   There is bilateral parenchymal infiltration. There are bilateral pleural effusions.   Degenerative changes present on the right with tip in the region of the superior vena cava.   There are degenerative changes of the spine.   COMPARISON OF FINDING: The catheter was placed in the interval.       Interval placement of a right jugular catheter. Findings suggestive of congestive heart failure.   MACRO: none   Signed by: Daniella Baker 11/28/2023 11:06 AM Dictation workstation:   [id number]    CARRIE without exercise    Result Date: 11/28/2023  Preliminary Cardiology [place] [address]      Phone [phone] Fax [phone]            Preliminary Vascular Lab Report  [facility] ANKLE BRACHIAL INDEX (CARRIE) WITHOUT EXERCISE  Patient Name:     SHAZIA Che Physician:  [id number] Maribell Rao MD Study Date:       11/28/2023     Ordering Provider:  [id number] KEON DELATORRE MRN/PID:          [id number]       Fellow: Accession#:       [id number]   Technologist:       Yasmin Alamo RVT YOB: 1935     Technologist 2: Gender:           F              Encounter#:         [id number] Admission Status: Inpatient      Location Performed: [facility]  Diagnosis/ICD: Peripheral  vascular disease, unspecified-I73.9  PRELIMINARY CONCLUSIONS:  Right Lower PVR: No evidence of arterial occlusive disease in the right lower extremity at rest. Triphasic flow is noted in the right common femoral artery, right posterior tibial artery and right dorsalis pedis artery. Left Lower PVR: No evidence of arterial occlusive disease in the left lower extremity at rest. Biphasic flow is noted in the left posterior tibial artery. Triphasic flow is noted in the left common femoral artery and left dorsalis pedis artery. Additional Findings: Technically limited due to movement, cooperation, pain tolerance to cuffs.  Imaging & Doppler Findings:  RIGHT Lower PVR                Pressures Ratios Right Posterior Tibial (Ankle) 108 mmHg  1.11 Right Dorsalis Pedis (Ankle)   104 mmHg  1.07 Right Digit (Great Toe)        65 mmHg   0.67   LEFT Lower PVR                Pressures Ratios Left Posterior Tibial (Ankle) 109 mmHg  1.12 Left Dorsalis Pedis (Ankle)   111 mmHg  1.14 Left Digit (Great Toe)        45 mmHg   0.46                      Left Brachial Pressure 97 mmHg   VASCULAR PRELIMINARY REPORT completed by Yasmin Alamo GISSELL on 11/28/2023 at 7:44:46 AM  ** Final **     XR chest 1 view    Result Date: 11/28/2023  Interpreted By:  Alfredito Rivera, STUDY: XR CHEST 1 VIEW;  11/27/2023 11:55 pm   INDICATION: Signs/Symptoms:CHF.   COMPARISON: 08/21/2022   ACCESSION NUMBER(S): [id number]   ORDERING CLINICIAN: KEON DELATORRE   FINDINGS: The heart is enlarged The pulmonary vasculature is congested centrally and indistinct peripherally, with interlobular septal thickening and indistinct mid to lower lung consolidative opacities consistent with pulmonary edema. There are large bilateral pleural effusions.       Severe pulmonary edema and large bilateral pleural effusions.     MACRO: None.   Signed by: Alfredito Rivera 11/28/2023 2:16 AM Dictation workstation:   [id number]    CT angio lower extremity left w and or wo IV  contrast    Result Date: 11/26/2023  INDICATION:  Multiple left foot wounds with purple discoloration and cold foot.  Assess for arterial occlusion. TECHNIQUE:  CTA images were obtained through the left lower extremity without and with intravenous contrast.   Omnipaque 350, 100 mL was administered intravenously.  Images are reviewed and processed at a workstation according to the CT angiogram protocol with 3-D and/or MIP post processing imaging generated. Automated mA/kV exposure control was utilized and patient examination was performed in strict accordance with principles of ALARA. COMPARISON:  US venous left lower extremity 10/09/2023.  XR left foot 10/09/2023. FINDINGS:  Limited imaging of the abdomen demonstrates an IVC filter.  There is extensive atherosclerotic disease and mural thrombus in the abdominal aorta there is a mild stenosis of the inferior mesenteric artery.  The left renal artery is not fully evaluated but does not appear to have flow limiting stenosis.  There is no evidence of abdominal aortic aneurysmal disease. There is colonic diverticulosis without evidence of diverticulitis. . This been previous hysterectomy.  Sclerotic lesion is seen in the right iliac bones.  Plain film radiography is recommended.  Clinical correlation is advised. On the right: Common iliac artery is patent.  The external iliac artery is patent.  The right common femoral artery is not visualized remain in the right lower extremity was not studied. On the left: Common iliac artery is patent.  The external iliac artery is patent.  Common femoral artery is patent.  The superficial femoral artery is patent.  Popliteal artery is patent. Anterior tibial artery, tibioperoneal trunk, posterior tibial artery, peroneal artery, dorsalis pedis artery and pedal posterior tibial artery are patent. Soft tissues of the left lower extremity are diffusely thickened consistent with advanced edema.  The musculature is poorly defined due to  the edema which extends into muscular compartments.  Underlying cellulitis is not excluded.  There is a left knee joint effusion. Degenerative changes are noted at the left knee.    1. IVC filter in place. 2. Extensive atherosclerotic disease of the abdominal aorta without evidence of aneurysmal disease. 3. Mild stenosis of the inferior mesenteric artery.  4. Extensive cellulitis or soft tissue edema involving the entire left lower extremity. On the right: 1.  No evidence of iliac artery stenosis. 2.  The right common femoral artery was not studied. On the left: 1.  Cellulitis or soft tissue edema involving the entire left lower extremity. 2.  No evidence of arterial occlusion. Signed by Alfredito Artis M.D.       Lab Results  Results for orders placed or performed during the hospital encounter of 11/26/23 (from the past 24 hour(s))   CBC   Result Value Ref Range    WBC 7.2 4.4 - 11.3 x10*3/uL    nRBC 0.0 0.0 - 0.0 /100 WBCs    RBC 4.44 4.00 - 5.20 x10*6/uL    Hemoglobin 11.3 (L) 12.0 - 16.0 g/dL    Hematocrit 36.2 36.0 - 46.0 %    MCV 82 80 - 100 fL    MCH 25.5 (L) 26.0 - 34.0 pg    MCHC 31.2 (L) 32.0 - 36.0 g/dL    RDW 21.2 (H) 11.5 - 14.5 %    Platelets 219 150 - 450 x10*3/uL   Basic Metabolic Panel   Result Value Ref Range    Glucose 71 (L) 74 - 99 mg/dL    Sodium 135 (L) 136 - 145 mmol/L    Potassium 3.5 3.5 - 5.3 mmol/L    Chloride 100 98 - 107 mmol/L    Bicarbonate 25 21 - 32 mmol/L    Anion Gap 14 10 - 20 mmol/L    Urea Nitrogen 29 (H) 6 - 23 mg/dL    Creatinine 1.12 (H) 0.50 - 1.05 mg/dL    eGFR 48 (L) >60 mL/min/1.73m*2    Calcium 8.1 (L) 8.6 - 10.3 mg/dL   Blood Gas Venous Full Panel   Result Value Ref Range    POCT pH, Venous 7.32 (L) 7.33 - 7.43 pH    POCT pCO2, Venous 45 41 - 51 mm Hg    POCT pO2, Venous 35 35 - 45 mm Hg    POCT SO2, Venous 44 (L) 45 - 75 %    POCT Oxy Hemoglobin, Venous 43.0 (L) 45.0 - 75.0 %    POCT Hematocrit Calculated, Venous 34.0 (L) 36.0 - 46.0 %    POCT Sodium, Venous 132 (L) 136  - 145 mmol/L    POCT Potassium, Venous 3.3 (L) 3.5 - 5.3 mmol/L    POCT Chloride, Venous 98 98 - 107 mmol/L    POCT Ionized Calicum, Venous 1.12 1.10 - 1.33 mmol/L    POCT Glucose, Venous 59 (L) 74 - 99 mg/dL    POCT Lactate, Venous 1.8 0.4 - 2.0 mmol/L    POCT Base Excess, Venous -3.0 (L) -2.0 - 3.0 mmol/L    POCT HCO3 Calculated, Venous 23.2 22.0 - 26.0 mmol/L    POCT Hemoglobin, Venous 11.4 (L) 12.0 - 16.0 g/dL    POCT Anion Gap, Venous 14.0 10.0 - 25.0 mmol/L    Patient Temperature 37.0 degrees Celsius    FiO2 21 %        Medications  Scheduled medications:  amiodarone, 200 mg, oral, Daily  [Held by provider] apixaban, 2.5 mg, oral, BID  [Held by provider] carvedilol, 6.25 mg, oral, BID  clindamycin, 900 mg, intravenous, q8h  furosemide, 80 mg, intravenous, BID  magnesium oxide, 400 mg, oral, Daily  meropenem, 2 g, intravenous, q12h  mirtazapine, 7.5 mg, oral, Nightly  polyethylene glycol, 17 g, oral, Daily  vancomycin, 750 mg, intravenous, q24h  zinc oxide, 1 Application, Topical, BID      Continuous medications:  norepinephrine in sodium chloride 0.9 %, 0.05-1 mcg/kg/min, Last Rate: Stopped (11/28/23 1430)      PRN medications:  PRN medications: acetaminophen **OR** acetaminophen **OR** acetaminophen, ondansetron **OR** ondansetron     Physical Exam  Constitutional:       General: She is not in acute distress.     Appearance: Normal appearance.      Comments: Sleepy, awakens and is pleasantly confused.    HENT:      Head: Normocephalic and atraumatic.      Right Ear: External ear normal.      Left Ear: External ear normal.      Nose: Nose normal.      Mouth/Throat:      Mouth: Mucous membranes are moist.      Pharynx: Oropharynx is clear.   Eyes:      Extraocular Movements: Extraocular movements intact.      Conjunctiva/sclera: Conjunctivae normal.      Pupils: Pupils are equal, round, and reactive to light.   Cardiovascular:      Rate and Rhythm: Normal rate and regular rhythm.      Pulses: Normal pulses.       Heart sounds: Normal heart sounds.   Pulmonary:      Effort: Pulmonary effort is normal. No respiratory distress.      Breath sounds: Normal breath sounds. No wheezing, rhonchi or rales.   Abdominal:      General: Abdomen is flat. Bowel sounds are normal.      Palpations: Abdomen is soft.      Tenderness: There is no abdominal tenderness. There is no right CVA tenderness, left CVA tenderness, guarding or rebound.   Musculoskeletal:         General: No swelling. Normal range of motion.      Cervical back: Normal range of motion and neck supple.      Right lower leg: Edema (2-3+) present.      Left lower leg: Edema (3-4+) present.   Skin:     General: Skin is warm and dry.      Capillary Refill: Capillary refill takes less than 2 seconds.      Findings: Lesion present. No rash.      Comments: L dorsal foot with two areas of purulence and dense surrounding erythema  L heel with skin slough       Neurological:      Mental Status: She is disoriented.   Psychiatric:         Mood and Affect: Mood normal.         Behavior: Behavior normal.                  Code Status  DNR and No Intubation     Assessment/Plan   This patient currently has cardiac telemetry ordered; if you would like to modify or discontinue the telemetry order, click here to go to the orders activity to modify/discontinue the order.    Cellulitis of left leg  Assessment & Plan  Had failed 2 rounds of outpatient Keflex  Wound culture  Start on broad-spectrum antibiotics per ID  ID consultation  CARRIE without ischemia  MRI without OM or abscess  CRP 12+  Podiatry consultation appreciated  Local wound care    Acute systolic heart failure (CMS/HCC)  Assessment & Plan  Hold home medications for right now although patient is edematous she is tachycardic and with lactic acidosis  Monitor BP and adjust as necessary  She remains on RA  Monitor volume status closely  Appreciate cardiology recommendations  Appreciate ICU monitoring   Echo from 11/29/23: EF 10-15%,  grade 1 diastolic dysfunction, reduced right ventricular systolic function, mild to moderate mitral valve regurgitation, moderate to severe tricuspid valve regurgitation, moderate aortic valve regurgitation, and a large pleural effusion.   Cardiology offered L/RHC- family leaning towards hospice care at home, meeting with [facility] 12/1/23    * Shock (CMS/[facility])  Assessment & Plan  2/2 Sepsis  Off IV vasopressors since 11/28/23  ICU consult appreciated  Continue to hold BP medications- can start midodrine if further hypotension    History of DVT of lower extremity  Assessment & Plan  IVC filter in place  Continue home Eliquis  No evidence of blood clot at this time    History of non-Hodgkin's lymphoma  Assessment & Plan  In remission since 2005    Aspiration into airway  Assessment & Plan  Regular diet per SLP    Paroxysmal atrial fibrillation (CMS/[facility])  Assessment & Plan  Continue home Eliquis  Continue rate control as per home  Monitor on telemetry    Weakness  Assessment & Plan  PT/OT           Malnutrition Diagnosis Status: New  Malnutrition Diagnosis: Moderate malnutrition related to chronic disease or condition  As Evidenced by: moderate muslce and fat losses, energy intakes <75% estimated needs x 1 month  I agree with the dietitian's malnutrition diagnosis.      DVT ppx: Home Eliquis       Please see orders for more complete plan    Eveline Artis PA-C

## 2023-11-29 NOTE — PROGRESS NOTES
Physical Therapy                 Therapy Communication Note    Patient Name: Patricia Jewell  MRN: [id number]  Today's Date: 11/29/2023     Discipline: Physical Therapy    Missed Visit Reason: Missed Visit Reason: Patient placed on medical hold (attempetd at 1048 hrs, per RN  pt. is not doing well medically, will hold therapy today, will monitor chart for medical status and appropriateness for therapy eval and initaite when stable)    Missed Time: Attempt    Comment:

## 2023-11-29 NOTE — PROGRESS NOTES
Per BULMARO REAGAN, palliative care coordinator: Met with pt and/or family to discuss goals of care, palliative and hospice services. Pt and/or family chose hospice services. Discussed [facility] (with financial disclosure) and community hospice options. Pt and/or family chose [facility]. Social work placed referral, care team notified, agency will arrange meeting with family to discuss services.     NEFTALI Myers, SHAHAB ([id number])   Care Transitions     SW present for ICU rounds, informed family has signed with [facility], plan is to discharge home with hospice tomorrow. No other needs identified at this time, SW signing off,  pt and care team aware of SW availability while inpt.     NEFTALI Myers, SHAHAB ([id number])   Care Transitions

## 2023-11-29 NOTE — NURSING NOTE
Warm bath and CHG bath completed. Pt tolerates well. Pt is able to rest/sleep comfortably. Dressing mepilex changed at sacrum.  DTR at bedside. Bed alarm active. Continue monitoring.

## 2023-11-29 NOTE — PROGRESS NOTES
Patricia Jewell is a 88 y.o. female on day 3 of admission presenting with Shock (CMS/HCC).    Subjective   Interval History:   Patient will likely be transitioned to hospice       Review of Systems  As above    Objective   Range of Vitals (last 24 hours)  Heart Rate:  []   Temp:  [34.8 °C (94.6 °F)-36 °C (96.8 °F)]   Resp:  [9-32]   BP: ()/(48-80)   Weight:  [62.9 kg (138 lb 10.7 oz)]   SpO2:  [54 %-100 %]   Daily Weight  11/29/23 : 62.9 kg (138 lb 10.7 oz)    Body mass index is 23.67 kg/m².    Physical Exam  .General: lethargic on pressors  Skin: no rashes  Neck: supple  CVS: S1S2  Chest:CTAB  GI: soft, non tender  : no CVAT  Extremities: left foot swelling and erythema, no induration, however boggy on exam   Psych: unable to assess  CNS: no FND      Antibiotics  iohexol (OMNIPaque) 350 mg iodine/mL solution 100 mL  lactated Ringer's bolus 1,000 mL  piperacillin-tazobactam-dextrose (Zosyn) IV 4.5 g  vancomycin (Vancocin) in dextrose 5 % water (D5W) 500 mL IV 1.5 g  sodium chloride 0.9% infusion  acetaminophen (Tylenol) tablet 650 mg  acetaminophen (Tylenol) oral liquid 650 mg  acetaminophen (Tylenol) suppository 650 mg  ondansetron (Zofran) tablet 4 mg  ondansetron (Zofran) injection 4 mg  polyethylene glycol (Glycolax, Miralax) packet 17 g  cefepime (Maxipime) IV 2 g  vancomycin in dextrose 5 % (Vancocin) IVPB 750 mg  lactated Ringer's bolus 1,000 mL  amiodarone (Pacerone) tablet 200 mg  apixaban (Eliquis) tablet 2.5 mg  carvedilol (Coreg) tablet 6.25 mg  furosemide (Lasix) tablet 40 mg  magnesium oxide (Mag-Ox) tablet 400 mg  sodium chloride 0.9% infusion  albumin human 5 % infusion 25 g  ALPRAZolam (Xanax) tablet 0.25 mg  phenylephrine (Jaun-Synephrine) 10 mg in sodium chloride 0.9% 250 mL (0.04 mg/mL) infusion (premix)  magnesium sulfate IV 2 g  perflutren lipid microspheres (Definity) injection 0.5-10 mL of dilution  sulfur hexafluoride microsphr (Lumason) injection 24.28 mg  perflutren protein A  microsphere (Optison) injection 0.5 mL  norepinephrine in dextrose 5 % (Levophed) infusion  - Omnicell Override Pull  norepinephrine (Levophed) 8 mg in dextrose 5% 250 mL (0.032 mg/mL) infusion (premix)  furosemide (Lasix) 500 mg in 50 mL (10 mg/mL) infusion  midazolam (Versed) injection  - Omnicell Override Pull  midazolam (Versed) injection 2 mg  mirtazapine (Remeron) tablet 7.5 mg  norepinephrine (Levophed) 16 mg in sodium chloride 0.9% 250 mL (0.064 mg/mL) infusion (premix)  meropenem (Merrem) 2 g in sodium chloride 0.9 % 100 mL IV      Relevant Results  Labs  Results from last 72 hours   Lab Units 11/29/23 0548 11/28/23 0008 11/27/23  0601 11/26/23  1535   WBC AUTO x10*3/uL 7.2 7.3 8.5 9.7   HEMOGLOBIN g/dL 11.3* 9.7* 12.0 13.0   HEMATOCRIT % 36.2 31.2* 38.4 42.4   PLATELETS AUTO x10*3/uL 219 151 202 201   NEUTROS PCT AUTO %  --  79.5  --  89.0   LYMPHS PCT AUTO %  --  12.5  --  5.9   MONOS PCT AUTO %  --  6.7  --  4.2   EOS PCT AUTO %  --  0.4  --  0.1       Results from last 72 hours   Lab Units 11/29/23 0548 11/28/23 0008 11/27/23  0601   SODIUM mmol/L 135* 131* 135*   POTASSIUM mmol/L 3.5 3.6 4.1   CHLORIDE mmol/L 100 96* 96*   CO2 mmol/L 25 23 27   BUN mg/dL 29* 27* 26*   CREATININE mg/dL 1.12* 1.21* 1.09*   GLUCOSE mg/dL 71* 76 77   CALCIUM mg/dL 8.1* 8.1* 8.8   ANION GAP mmol/L 14 16 16   EGFR mL/min/1.73m*2 48* 44* 50*   PHOSPHORUS mg/dL  --  4.1  --        Results from last 72 hours   Lab Units 11/28/23 0008 11/26/23  1535   ALK PHOS U/L 59 92   BILIRUBIN TOTAL mg/dL 1.5* 1.4*   PROTEIN TOTAL g/dL 4.8* 6.1*   ALT U/L 12 15   AST U/L 18 34   ALBUMIN g/dL 3.1* 3.4       Estimated Creatinine Clearance: 31.4 mL/min (A) (by C-G formula based on SCr of 1.12 mg/dL (H)).  C-Reactive Protein   Date Value Ref Range Status   11/27/2023 12.47 (H) <1.00 mg/dL Final       Assessment/Plan     Left foot cellulitis   H/o DVT  H/o non hodgkin's lymphoma  CHF     Suggest:  C/w vancomycin   Monitor creatinine and  check vancomycin trough   C/w meropenem in view of septic shock   C/w clindamycin  Follow blood cx:ng1d  Follow wound cx  MRI foot reviewed  Trend wbc and temps      Rhett Owens ID  Pager:[phone]  Date of service: 11/29/2023  Time of service: 1:18 PM

## 2023-11-29 NOTE — PROGRESS NOTES
Speech-Language Pathology    Inpatient  Speech-Language Pathology Treatment     Patient Name: Patricia Jewell  MRN: [id number]  Today's Date: 11/29/2023  Time Calculation  Start Time: 1445  Stop Time: 1531  Time Calculation (min): 46 min         Current Problem:   1. Cellulitis, unspecified cellulitis site  CARRIE without exercise    CARRIE without exercise    CANCELED: Vascular US PVR without exercise    CANCELED: Vascular US PVR without exercise      2. Other specified symptoms and signs involving the circulatory and respiratory systems  CARRIE without exercise    CARRIE without exercise    CANCELED: Vascular US PVR without exercise    CANCELED: Vascular US PVR without exercise      3. Oropharyngeal dysphagia [R13.12]        4. Peripheral vascular disease, unspecified (CMS/HCC)  CARRIE without exercise      5. 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 Acute combined systolic (congestive) and diastolic (congestive) heart failure (CMS/HCC)  Transthoracic Echo (TTE) Complete      7. Septic shock (CMS/HCC)  Central Line    Central Line            SLP Assessment:  SLP TX Intervention Outcome: No Progress Made  SLP Assessment Results: Cognitive Deficits  Prognosis: Fair  Treatment Provided: Yes   Treatment Tolerance:  (Limited by pt's refusal to consume more than one sip.)  Medical Staff Made Aware: Yes  Barriers: Cognition, Comorbidities  Education Provided: Yes       Plan:  Treatment/Interventions:  (PO trials)  SLP TX Plan: Continue Plan of Care  SLP Plan: Skilled SLP  SLP Frequency: 2x per week  Duration: 2 weeks  SLP Discharge Recommendations: Home with no further SLP (Family is requesting hospice consult for when pt goes home.)  Next Treatment Priority: reassess swallow to determine safest diet  Discussed POC: Patient, Caregiver/family, Nursing, Physician  Discussed Risks/Benefits: Yes, Caregiver/Family, Nursing,  Physician  Patient/Caregiver Agreeable: Yes  SLP - OK to Discharge: Yes (when medically stable as determined per physician)      Subjective   Current Problem:  Pt resting in bed with family present.  Pt eventually agreeable to speech therapy and taking some sips of water and jello.    Pt was positioned up in be with RN.     Most Recent Visit:  SLP Received On: 11/29/23    General Visit Information:   Prior to Session Communication: Bedside nurse (nurse reported that last evening pt coughed on a sip of mighty shake.  This am RN reported she gave pt medication in a spoonful of NTL and pt did fine.)      Pain Assessment:   Pain Assessment: 0-10  Pain Score: 0 - No pain      Objective   Therapeutic Swallow:  Therapeutic Swallow Intervention : PO Trials  Solid Diet Recommendations: Soft & bite sized/chopped (IDDSI Level 6) (Only after repeat Nursing swallow screen)  Liquid Diet Recommendations: Thin (IDDSI Level 0) (Only after repeat Nursing swallow screen)  Swallow Comments: SLP allowed pt to hold cup and take sips independently.  Pt took a large consecutive sip of thin water via cup.    Oral phase: Suspect oral incoordination with large consecutive sips along with suspected premature pharyngeal entry prior to the swallow.     Pharyngeal phase:  pt had overt coughing with large consecutive sip of thin liquid.     Pt then refused any further trials with SLP despite encouragement and bargaining.      Recommend NPO until RN can complete a nursing swallow screen utilizing small sips at a time with either spoon sips or pinching the straw.  Family aware pt needs swallow screen before resuming diet.     SLP will follow up tomorrow.        Inpatient Education:  Adult Outpatient Education  Individual(s) Educated: Child (RN, physician)  Verbal Education : NPO until pt can participate in nursing swallow screen with PO trials with small sips at a time.  If pt passes then may resume previous diet. Sit up for PO, Feed slowly, use  spoonful sip if needed to pinch straw so pt only gets small amount at a time. Encourage oral intake.  Risk and Benefits Discussed with Patient/Caregiver/Other: yes  Patient/Caregiver Demonstrated Understanding: yes  Plan of Care Discussed and Agreed Upon: yes  Patient Response to Education:  (needs reinforcement)  Education Comment: Pt very particular.  Window of opportunity is very small to get pt to consume anything by mouth.  Per family this has been ongoing for a very long time.  Signficant wt loss has been noted since summer til now.

## 2023-11-29 NOTE — CONSULTS
" Palliative Care Consultation    History obtained from: patient's daughter Shira, grandson José Miguel \"Messi\", son in law Joseph, and medical records. Patient confused and unable to contribute     HPI: Patricia Jewell is a 88 y.o. female with past medical history significant for CHF, atrial fibrillation on Eliquis, hypertension, history of non-Hodgkin's lymphoma in remission since 2005 s/p chemo and radiation Dr. Garces treating oncologist, history of right lower extremity blood clot s/p IVC filter, and cognitive limitations. Follows with CHF clinic at [facility]. Patient presented to the ED on 11/26 for left lower extremity erythema, swelling, and purulence. She was found to have left leg cellulitis, acute encephalopathy, likely aspiration, Afib with RVR, acute systolic heart failure, and shock prompting a transfer to the ICU and placed on vasopressors. TTE showed severe left ventricular systolic function 10-15% estimated ejection fraction.     Living arrangement and functional status prior to admission: Patient lives at her home and family come to stay with her including main caregivers grandson José Miguel \"Messi\" and daughter Shira. She has  24/7 care, uses a walker but does not ambulate much, requires assistance with bathing and dressing and has been incontinent x 3 weeks. She has been staying at her daughters home for the last week and recently required family to push her in the rollator acting as a wheelchair for the last few weeks. Family do the cooking and cleaning, medication management and provide transportation with patient last driving 8 months ago.     QOL over last 6 months: family state \"good, but everything seems to be happening more rapidly\". They explain that patient has been having increasing weakness, instability and shortness of breath as well as worsening confusion and paranoia. Family report she has not recovered since her ankle fracture in March and has had a slow decline family contribute somewhat to her " "stubbornness and desire for independence.   Falls: 3 falls since March  Prior Hospitalizations: patient has had 2 hospitalizations and 1 ED visit in the last 8 months  10/9/23 ED cellulitis of left lower ext  3/15/23-3/18/23: walking the dog and sustained a right ankle fracture, SILVINA discharged to [facility]  Cognitive status: baseline limitations, does not always recognize her daughter even though she is there frequently, paranoia behaviors, family report she has 2 imaginary friends over the last 6 months    PSH: cataract, inguinal hernia repair, hysterectomy, IVC filter, thoracentesis 2018, right ankle fracture     FH: father black lung from coal mining, mother  of pulmonary embolism, sister tremors, brother esophageal cancer, brother  of MI    Social History   Marital Status:  since   Children: 2 daughters  Employment: [employer] at [employer] and retired at 78 years old   Status: no  Tobacco/alcohol/elicit substance use history: former smoker, quit . No alcohol or illicit drug use  Orthodox/Cultural/Spiritual: Raised Christian, family uncertain beliefs at this time.   Patient finds olga and happiness in: going to the [employer] playing the slot machines, family     Palliative care consulted for goals of care, code status, advanced directive, high risk for readmissions, symptom management and outpatient support.    Met with dtr Shira, son in [place] \"Messi\" grandson/PO. Family report patient is an independent person and \"stubborn\". She sometimes does not take her medications and hides them around the house. Family report that she will never admitted that she is sick because she views it as a punishment from God. She also has a fear/discomfort of doctors and hospitals and historically only sought medical care during a crisis. Her oncologist, Dr. 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 Family report some paranoid behaviors at " "home including holding onto her keys and purse and frequently misplaces them and blames family. Family has had to tell her that her car is not running so that she won't drive away and becomes paranoid that people are shining lights into her home (she lives on a corner lot and often cars driving by shine lights into her windows) causing anxiety. Family state that she also has \"imaginary friends\" whom she speaks about as well as sundowners and auditory/visual hallucinations x 6 months or more.     Symptom Assessment  Dyspnea: present , does not wear oxygen at home   Bowels: last documented BM 11/27  Anxiety/anxiety: trying to get OOB, pulled out fishman, unable to redirect, family stayed overnight as this worsens during hospitalizations.   Insomnia: at times when \"fixating on things\"   Appetite: significant decrease x 3 weeks, mirtazapine 7.5 mg at  started 11/28  Dysphagia: present, SLP following  Skin impairment: wound to left lower extremity since mid October    Goals of Care  Decisional Capacity: No  Understanding of illness: family=good, patient=poor   Expectations of treatment/goals of care: Hospice consult and likely discharge home with hospice care and not return to the hospital. Patient dislikes hospitals and medical care and family feel she will be more comfortable at home with family. Symptom management, family to provide around the clock care as they have been doing and allow her to die in her own home. Family are hoping that she can be able to attend her granddaughter's wedding on 12/9 at [facility] 1.5 hours away.   Fears: none verbalized by family     Advanced Care Planning  Advance Directives:    Health care power of : received, jeronimo Littleon    [place] DNR form: will need prior to discharge   Health Care Directives on file? Copy placed on hospital chart     Current code status: Full Code   Code status discussed today?Yes, DNR/DNI, no escalation of care     Is the patient hospice-eligible? " Yes  Was a discussion held re: hospice services? Yes  Was a decision made re: hospice services? Yes  Was a decision made re: hospice agency choice? Yes, [facility]    Review of Systems   Constitutional:  Positive for activity change.   Respiratory:  Positive for shortness of breath.    Cardiovascular:  Positive for leg swelling.   Gastrointestinal:  Positive for vomiting.   Genitourinary:         Incontinent   Skin:  Positive for wound.   Neurological:  Positive for tremors and weakness.   Psychiatric/Behavioral:  Positive for confusion.        Physical Exam  Neurological:      Mental Status: She is alert. She is disoriented.       Plan    Goals of care: No escalation of care, hospice consult with plans for discharge home with hospice admission, no further hospitalizations, symptom control   Completed advanced directives: No  Change in code status: Yes, DNR/DNI, no escalation of care  Completed [place] DNR: No, will need prior to discharge   Interventions for symptom management: No  Consult music therapy and pastoral care   Outpatient services/resources recommended: hospice, family would like home hospice     Collaborated with: Eveline Robles spent 120 minutes in the professional and overall care of this patient.      Ted Hernandez, APRN-CNP

## 2023-11-29 NOTE — CONSULTS
Wound Care Consult     Visit Date: 11/29/2023      Patient Name: Patricia Jewell         MRN: [id number]           YOB: 1935       Pertinent Labs:   Albumin   Date Value Ref Range Status   11/28/2023 3.1 (L) 3.4 - 5.0 g/dL Final     Wound Assessment:  Wound Dorsal foot;Left (Active)   Wound Image   11/26/23 2335   Wound Length (cm) 3 cm 11/26/23 2335   Wound Width (cm) 0.5 cm 11/26/23 2335   Wound Surface Area (cm^2) 1.5 cm^2 11/26/23 2335   State of Healing Slough 11/29/23 0400       Wound 11/26/23 Heel Left (Active)   Wound Image   11/26/23 2334   Wound Length (cm) 8 cm 11/26/23 2335   Wound Width (cm) 8 cm 11/26/23 2335   Wound Surface Area (cm^2) 64 cm^2 11/26/23 2335   State of Healing Epithelialized 11/28/23 1600       Wound 11/26/23 Other (comment) Perineum (Active)   Site Assessment Excoriated 11/29/23 0400   Drainage Amount Scant 11/29/23 0400   Dressing Foam 11/28/23 1600       Wound 11/28/23 Pressure Injury Sacrum Bilateral (Active)   Wound Image   11/29/23 1101   Site Assessment Pink;Red 11/29/23 1101   Elizabeth-Wound Assessment Blanchable erythema 11/29/23 1101   Non-staged Wound Description Partial thickness 11/29/23 1101   Pressure Injury Stage 2 11/29/23 1101   Shape round 11/29/23 1101   Wound Length (cm) 0.5 cm 11/29/23 1101   Wound Width (cm) 0.5 cm 11/29/23 1101   Wound Surface Area (cm^2) 0.25 cm^2 11/29/23 1101   Wound Depth (cm) 0.1 cm 11/29/23 1101   Wound Volume (cm^3) 0.025 cm^3 11/29/23 1101   Wound Bed Granulation (%) 100 % 11/29/23 1101   Margins Well-defined edges 11/29/23 1101   Drainage Description None 11/29/23 1101   Drainage Amount None 11/29/23 1101   Dressing Foam 11/29/23 1101   Dressing Changed Changed 11/29/23 1101   Dressing Status Clean;Dry;Occlusive 11/29/23 1101     Patient seen for sacral wound (present on admission) complicated by PMH: CHF, atrial fibrillation on Eliquis, hypertension, history of non-Hodgkin's lymphoma in remission since 2005, history of right  lower extremity blood clot s/p IVC filter per chart review. Exam conducted with PCA Zayra to assist with positioning. Patient alert but confused. Patient also has left foot wound (POA), spoke to Dr. Larkin who plans on seeing patient today, will defer recommendations to podiatry. Left foot dressing intact, clean and dry, wound not assessed at this time. Patient is incontinent per nursing staff. Excoriation noted to derik area, zinc order obtained per IMS. Skin hygiene and dressing care provided. See detailed assessment above from flowsheet. Recommendations below, reviewed with Eveline Artis, orders received.     Wound location: Sacrum    Treatment protocol recommended:  Cleanse with normal saline and pat dry.  Apply mepilex foam every 3 days/prn.   Continue to off load, turning at least every 2 hours. Offload heels.      Therapeutic surface: Patient on Progressa AIR (ICU bed) during exam. Positioned onto left side after exam. Staff to continue to turn and reposition atleast every 2 hours. Offload heels.     Nursing updated, continue pressure injury preventions, nursing to follow provider orders and re-consult wound RN if needed.    See above recommendations for treatment. Patient will continue to require assistance with skin hygiene and further pressure injury prevention.     Please contact me with questions or changes in patient condition.  Nancie Barriga RN  W[facility]   [phone]       Nancie Barriga RN  11/29/2023  11:27 AM

## 2023-11-29 NOTE — NURSING NOTE
Resume pt care. Pt is assessed at bedside.  Dressing for Lt foot/heel is dry and intact.  Dressing was changed by day shift RN per shift report. Vee care completed. Repositioned. Mepilex at sacrum is dry and intact. POC discussed and updated with families at bedside. Continue monitoring.

## 2023-11-30 NOTE — PROGRESS NOTES
Spiritual Care Visit    Clinical Encounter Type  Visited With: Patient and family together  Routine Visit: Introduction    Shinto Encounters  Shinto Needs: Prayer

## 2023-11-30 NOTE — PROGRESS NOTES
Speech-Language Pathology    Inpatient  Speech-Language Pathology Treatment     Patient Name: Patricia Jewell  MRN: [id number]  Today's Date: 11/30/2023  Time Calculation  Start Time: 1228  Stop Time: 1243  Time Calculation (min): 15 min         Current Problem:   1. Cellulitis, unspecified cellulitis site  CARRIE without exercise    CARRIE without exercise    CANCELED: Vascular US PVR without exercise    CANCELED: Vascular US PVR without exercise      2. Other specified symptoms and signs involving the circulatory and respiratory systems  CARRIE without exercise    CARRIE without exercise    CANCELED: Vascular US PVR without exercise    CANCELED: Vascular US PVR without exercise      3. Oropharyngeal dysphagia [R13.12]        4. Peripheral vascular disease, unspecified (CMS/HCC)  CARRIE without exercise      5. 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 Acute combined systolic (congestive) and diastolic (congestive) heart failure (CMS/HCC)  Transthoracic Echo (TTE) Complete      7. Septic shock (CMS/HCC)  Central Line    Central Line            SLP Assessment:  SLP TX Intervention Outcome: No Progress Made  SLP Assessment Results: Cognitive Deficits (medical status declining)  Prognosis: Poor  Treatment Provided: Yes,  SLP provided education to family regarding modified diet and liquids for safe oral intake if pt is awake and alert enough to consume PO.  Medical Staff Made Aware: Yes  Barriers: Cognition, Comorbidities  Education Provided: Yes       Plan:  Treatment/Interventions:  (PO trials)  SLP TX Plan: Discharge from Speech Therapy  SLP Plan: No skilled SLP  No Skilled SLP: Patient refusal (family taking pt home with hospice 12/01/2023)  SLP Discharge Recommendations: Home with no further SLP  Discussed POC: Caregiver/family  Discussed Risks/Benefits: Caregiver/Family  Patient/Caregiver Agreeable: Yes  SLP - OK to Discharge:  Yes      Dysphagia Goals:   Pt will consume Regular diet Texture and Thin liquids  with reduced risk of penetration and or aspiration >95% of meals  Pt will utilize compensatory strategies to improve oral intake.  Family education for pleasure feedings.    Subjective   Current Problem:  Pt was given ativan d/t agitation and she is now sleeping soundly.    Over the night time pt's BP dropped.  Dtr in room and stated she was told she only had a few hrs to live.     Pt is going home with hospice tomorrow.    SLP d/w with RN and dtr regarding PO for pleasure when pt is awake and alert.  Dtr agreeable.  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     Most Recent Visit:  SLP Received On: 11/30/23    General Visit Information:   Prior to Session Communication: Bedside nurse (nurse reported that last evening pt coughed on a sip of mighty shake.  This am RN reported she gave pt medication in a spoonful of NTL and pt did fine.)      Pain Assessment:   Pain Assessment:  (Pt sleeping.)  Pain Score: 0 - No pain      Objective   Therapeutic Swallow:  Therapeutic Swallow Intervention : Caregiver Education  Solid Diet Recommendations:  (PO for pleasure)  Liquid Diet Recommendations:  (PO for pleasure)  Swallow Comments: SLP d/w dtr pleasure feedings.  Dtr in agreement.  She will  thickener from pharmacy prior to pt going home.     SLP d/w her, positioning, small bite/sips sizes, thickening liquids, pureeing foods.  Dtr understanding and aware on how to do these things from her prior job.     Pleasure feedings as tolerated only when pt is awake and alert and positioned up at 90 degrees.           Inpatient Education:  Adult Inpatient Education  Individual(s) Educated: Child (RN, physician)  Verbal Education : Pleasure feedings only when pt is awake and alert and can sit up at 90 degrees. Small bites/sip, small sips at a time.   Sit up for PO, Feed slowly, use spoonful sip if needed to  pinch straw so pt only gets small amount at a time. Encourage oral intake. Thicken liquids if needed for safety and puree foods if needed.   Risk and Benefits Discussed with Patient/Caregiver/Other: yes  Patient/Caregiver Demonstrated Understanding: yes  Plan of Care Discussed and Agreed Upon: yes  Patient Response to Education:  (needs reinforcement)  Education Comment: Dtr educated and very agreeable.

## 2023-11-30 NOTE — PROGRESS NOTES
"Vancomycin Dosing by Pharmacy- FOLLOW UP    Patricia Jewell is a 88 y.o. year old female who Pharmacy has been consulted for vancomycin dosing for cellulitis, skin and soft tissue. Based on the patient's indication and renal status this patient is being dosed based on a goal AUC of 500-600.     Renal function is currently declining.    Current vancomycin dose: 750 mg given every 24 hours    Estimated vancomycin AUC on current dose: 572 mg/L.hr     Visit Vitals  /88   Pulse 101   Temp 35.2 °C (95.4 °F) (Temporal)   Resp 22        Lab Results   Component Value Date    CREATININE 1.42 (H) 11/30/2023    CREATININE 1.12 (H) 11/29/2023    CREATININE 1.21 (H) 11/28/2023    CREATININE 1.09 (H) 11/27/2023        Patient weight is No results found for: \"PTWEIGHT\"    No results found for: \"CULTURE\"     I/O last 3 completed shifts:  In: 915.6 (14.6 mL/kg) [P.O.:10; I.V.:21.9 (0.3 mL/kg); IV Piggyback:883.7]  Out: 349 (5.5 mL/kg) [Urine:349 (0.2 mL/kg/hr)]  Weight: 62.9 kg   [unfilled]    Lab Results   Component Value Date    PATIENTTEMP 37.0 11/29/2023    PATIENTTEMP 37.0 11/28/2023    PATIENTTEMP 37.0 11/28/2023        Assessment/Plan    Within goal AUC range. Continue current vancomycin regimen.    This dosing regimen is predicted by [employer] to result in the following pharmacokinetic parameters:  Loading dose: N/A  Regimen: 750 mg IV every 24 hours.  Start time: 22:15 on 11/30/2023  Exposure target: AUC24 (range)400-600 mg/L.hr   AUC24,ss: 572 mg/L.hr  Probability of AUC24 > 400: 94 %  Ctrough,ss: 18.1 mg/L  Probability of Ctrough,ss > 20: 37 %  Probability of nephrotoxicity (Lodise FRED 2009): 14 %      The next level will be obtained on 12/3 at 0500. May be obtained sooner if clinically indicated.   Will continue to monitor renal function daily while on vancomycin and order serum creatinine at least every 48 hours if not already ordered.  Follow for continued vancomycin needs, clinical response, and signs/symptoms " of toxicity.       Ramírez Ray, RPh

## 2023-11-30 NOTE — PROGRESS NOTES
Patricia Jewell is a 88 y.o. female on day 4 of admission presenting with Shock (CMS/HCC).    Subjective   Interval History:   Patient will likely be transitioned to hospice       Review of Systems  As above    Objective   Range of Vitals (last 24 hours)  Heart Rate:  []   Temp:  [35.1 °C (95.2 °F)-36 °C (96.8 °F)]   Resp:  [20-33]   BP: ()/(30-88)   SpO2:  [87 %-100 %]   Daily Weight  11/29/23 : 62.9 kg (138 lb 10.7 oz)    Body mass index is 23.67 kg/m².    Physical Exam  .General: lethargic on pressors  Skin: no rashes  Neck: supple  CVS: S1S2  Chest:CTAB  GI: soft, non tender  : no CVAT  Extremities: left foot swelling and erythema, no induration, however boggy on exam   Psych: unable to assess  CNS: no FND      Antibiotics  iohexol (OMNIPaque) 350 mg iodine/mL solution 100 mL  lactated Ringer's bolus 1,000 mL  piperacillin-tazobactam-dextrose (Zosyn) IV 4.5 g  vancomycin (Vancocin) in dextrose 5 % water (D5W) 500 mL IV 1.5 g  sodium chloride 0.9% infusion  acetaminophen (Tylenol) tablet 650 mg  acetaminophen (Tylenol) oral liquid 650 mg  acetaminophen (Tylenol) suppository 650 mg  ondansetron (Zofran) tablet 4 mg  ondansetron (Zofran) injection 4 mg  polyethylene glycol (Glycolax, Miralax) packet 17 g  cefepime (Maxipime) IV 2 g  vancomycin in dextrose 5 % (Vancocin) IVPB 750 mg  lactated Ringer's bolus 1,000 mL  amiodarone (Pacerone) tablet 200 mg  apixaban (Eliquis) tablet 2.5 mg  carvedilol (Coreg) tablet 6.25 mg  furosemide (Lasix) tablet 40 mg  magnesium oxide (Mag-Ox) tablet 400 mg  sodium chloride 0.9% infusion  albumin human 5 % infusion 25 g  ALPRAZolam (Xanax) tablet 0.25 mg  phenylephrine (Jaun-Synephrine) 10 mg in sodium chloride 0.9% 250 mL (0.04 mg/mL) infusion (premix)  magnesium sulfate IV 2 g  perflutren lipid microspheres (Definity) injection 0.5-10 mL of dilution  sulfur hexafluoride microsphr (Lumason) injection 24.28 mg  perflutren protein A microsphere (Optison) injection 0.5  mL  norepinephrine in dextrose 5 % (Levophed) infusion  - Omnicell Override Pull  norepinephrine (Levophed) 8 mg in dextrose 5% 250 mL (0.032 mg/mL) infusion (premix)  furosemide (Lasix) 500 mg in 50 mL (10 mg/mL) infusion  midazolam (Versed) injection  - Omnicell Override Pull  midazolam (Versed) injection 2 mg  mirtazapine (Remeron) tablet 7.5 mg  norepinephrine (Levophed) 16 mg in sodium chloride 0.9% 250 mL (0.064 mg/mL) infusion (premix)  meropenem (Merrem) 2 g in sodium chloride 0.9 % 100 mL IV      Relevant Results  Labs  Results from last 72 hours   Lab Units 11/30/23 0638 11/29/23 0548 11/28/23 0008   WBC AUTO x10*3/uL 9.6 7.2 7.3   HEMOGLOBIN g/dL 12.2 11.3* 9.7*   HEMATOCRIT % 38.9 36.2 31.2*   PLATELETS AUTO x10*3/uL 241 219 151   NEUTROS PCT AUTO %  --   --  79.5   LYMPHS PCT AUTO %  --   --  12.5   MONOS PCT AUTO %  --   --  6.7   EOS PCT AUTO %  --   --  0.4       Results from last 72 hours   Lab Units 11/30/23 0638 11/29/23 0548 11/28/23 0008   SODIUM mmol/L 134* 135* 131*   POTASSIUM mmol/L 3.5 3.5 3.6   CHLORIDE mmol/L 101 100 96*   CO2 mmol/L 16* 25 23   BUN mg/dL 28* 29* 27*   CREATININE mg/dL 1.42* 1.12* 1.21*   GLUCOSE mg/dL 62* 71* 76   CALCIUM mg/dL 8.4* 8.1* 8.1*   ANION GAP mmol/L 21* 14 16   EGFR mL/min/1.73m*2 36* 48* 44*   PHOSPHORUS mg/dL  --   --  4.1       Results from last 72 hours   Lab Units 11/28/23 0008   ALK PHOS U/L 59   BILIRUBIN TOTAL mg/dL 1.5*   PROTEIN TOTAL g/dL 4.8*   ALT U/L 12   AST U/L 18   ALBUMIN g/dL 3.1*       Estimated Creatinine Clearance: 24.7 mL/min (A) (by C-G formula based on SCr of 1.42 mg/dL (H)).  C-Reactive Protein   Date Value Ref Range Status   11/27/2023 12.47 (H) <1.00 mg/dL Final       Assessment/Plan     Left foot cellulitis   H/o DVT  H/o non hodgkin's lymphoma  CHF     Suggest:  C/w vancomycin   Monitor creatinine and check vancomycin trough   C/w meropenem in view of septic shock   Dced clindamycin  Follow blood cx:ng2d  Wound cx  reviewed  MRI foot reviewed  Trend wbc and temps      Rhett Owens ID  Pager:[phone]  Date of service: 11/30/2023  Time of service: 2:37 PM

## 2023-11-30 NOTE — PROGRESS NOTES
Medication Adjustment    The following medication(s) was/were adjusted for Patricia Jewell per protocol/policy due to altered renal function.    Medication(s) adjusted:   Per System Dosing Policy    Dose adjustment from Meropenem 2g q12h to Meropenem 1g q12h for CrCl 24.7 mL/min      Ramírez Ray, KAPILh

## 2023-11-30 NOTE — NURSING NOTE
RN Hospice Note    Patricia Jewell is a Hospice Patient.   Hospice terminal diagnosis:    Physician:  Jarrell Renteria MD  Visit type: Admission - [place] Patient    Comments/recommendations:  Met with José Miguel (grandson) and Prabha (daughter) to discuss hospice support at home.  Both are in agreement and consents were signed.  Prabha had questions about wound management, visit frequencies and basic care needs.  Reviewed services with her and how this would be managed.  At this time, family declines a hospital bed but does request oxygen, transport wheelchair and OBT for delivery tomorrow between 9 and 1pm.  Scripts will be needed for comfort meds.  Team members updated.    Discharge Planning:  Patient to be discharged to home    The following is to be completed:  Discharge order: Y  State DNR signed by MD: [facility]  Nursing facility referral/transfer form: NA  Medication reconciliation: Y  PAS/RR or convalescent stay form: NA  Prescriptions for al narcotics/new medications: Y  Transportation: Y  Other: DME ordered    Plan of care reviewed with patient/family members José Miguel (grandson) and Prabha (daughter)   Plan of care reviewed with hospital staff members: Jarrell Renteria MD, Juli Hartley (bedside nurse), Salima ERWIN, Ted Hernandez, palliative care NP    Please notify [facility] of any changes in condition. Thank you.  Office: [phone] (8 am-6:30 pm M-F and 8 am-4:30 pm weekends and holidays)   [phone] (6:30 pm-8 am M-F and 4:30 pm-8 am weekends and holidays)    Lakeisha Perez RN

## 2023-11-30 NOTE — PROGRESS NOTES
Patricia Jewell is a 88 y.o. female on day 4 of admission presenting with Shock (CMS/HCC).      Subjective   Patricia Jewell is a 88 y.o. female with CHF, atrial fibrillation on Eliquis, hypertension, history of non-Hodgkin's lymphoma in remission since 2005, history of right lower extremity blood clot s/p IVC filter who presented to the emergency department 11/26/2023 with complaints of erythema, swelling, purulence from left lower extremity wound. Apparently patient has had a wound on her left lower extremity since approximately mid October.  She has been seen for this wound multiple times by both emergency department and PCP, has completed 2 rounds of Keflex prior to this.  Daughter at bedside reports they had been doing local wound care at home with Epsom salt soaks and Neosporin however due to the swelling in the legs it appears she had a wound open on the top of her foot sometime in the last 1 month. 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  Nocturinst notified of hypotension 78/48 on 11/27/23. Coreg held, patient moved to ICU and started on pressors as could not give full 30ml/kg sepsis bolus given acute heart failure and volume overload. Lactate 3.8-> 3.3-->4.7-->3.3-->1.7. Blood cultures negative x2 days. CARRIE negative. MRI with cellulitis, no OM. Echo: EF 10-15%, +DD, reduced RV systolic function, mod-severe TR, global hypokinesis LV    W cx with abundant mixed bacteria     11/30/23: Had further hypotension overnight, 63/44- family wished for pressors so dopamine was started. No leukocytosis. Cr 1.42, suspect from hypotension and lasix- hold lasix. Family met with [facility] today, goal is continue care as is for today with NO escalation of care and discharge to home with hospice care tomorrow.           Review of Systems   Unable to perform ROS: Mental status change          Objective     Last Recorded Vitals  BP 94/63   Pulse 107   Temp 35.1 °C (95.2 °F) (Temporal)   Resp 24   Wt 62.9 kg (138 lb 10.7 oz)   SpO2 96%     Image Results  Transthoracic Echo (TTE) Complete    Result Date: 11/28/2023              [place] [address]      Phone [phone] Fax [phone] TRANSTHORACIC ECHOCARDIOGRAM REPORT  Patient Name:      SHAZIA TANA Che Physician:    [id number] Wil Leiva MD Study Date:        11/28/2023           Ordering Provider:    [id number] MIR WASHINGTON MRN/PID:           [id number]             Fellow: Accession#:        [id number]         Nurse: Date of Birth/Age: 1935 / 88      Sonographer:          Laura copeland RDCS Gender:            F                    Additional Staff: Height:            162.56 cm            Admit Date:           11/26/2023 Weight:            60.78 kg             Admission Status:     Inpatient -                                                               Routine BSA:               1.65 m2              Department Location:  [facility] Blood Pressure: 97 /71 mmHg Study Type:    TRANSTHORACIC ECHO (TTE) COMPLETE Diagnosis/ICD: Acute combined systolic (congestive) and diastolic (congestive)                heart failure (CHF)-I50.41 Indication:    Congestive Heart Failure CPT Codes:     Echo Complete w Full Doppler-[id number]  Study Detail: The following Echo studies were performed: 2D, M-Mode, Doppler and               color flow.  PHYSICIAN INTERPRETATION: Left Ventricle: Left ventricular systolic function is severely decreased, with an estimated ejection fraction of 10-15%. There is global hypokinesis of the left ventricle with  minor regional variations. The left ventricular cavity size is normal. Spectral Doppler shows an impaired relaxation pattern of left ventricular diastolic filling. Left Atrium: The left atrium is mildly dilated. Right Ventricle: The right ventricle is mildly enlarged. There is reduced right ventricular systolic function. Right Atrium: The right atrium is mildly dilated. Aortic Valve: The aortic valve is trileaflet. There is moderate aortic valve regurgitation. The peak instantaneous gradient of the aortic valve is 2.9 mmHg. Mitral Valve: The mitral valve is normal in structure. There is mild to moderate mitral valve regurgitation. Tricuspid Valve: The tricuspid valve is structurally normal. There is moderate to severe tricuspid regurgitation. The Doppler estimated RVSP is mildly elevated at 41.3 mmHg. Echodensity visualized in the IVC. Pulmonic Valve: The pulmonic valve is structurally normal. There is mild pulmonic valve regurgitation. Pericardium: There is no pericardial effusion noted. Pleural: There is a large bilateral pleural effusion. Aorta: The aortic root is normal.  CONCLUSIONS:  1. Left ventricular systolic function is severely decreased with a 10-15% estimated ejection fraction.  2. Spectral Doppler shows an impaired relaxation pattern of left ventricular diastolic filling.  3. There is reduced right ventricular systolic function.  4. There is a large pleural effusion.  5. Mild to moderate mitral valve regurgitation.  6. Mildly elevated RVSP.  7. Moderate to severe tricuspid regurgitation.  8. Moderate aortic valve regurgitation.  9. There is global hypokinesis of the left ventricle with minor regional variations. QUANTITATIVE DATA SUMMARY: 2D MEASUREMENTS:                          Normal Ranges: Ao Root d:     3.20 cm   (2.0-3.7cm) LAs:           4.56 cm   (2.7-4.0cm) IVSd:          0.63 cm   (0.6-1.1cm) LVPWd:         0.60 cm   (0.6-1.1cm) LVIDd:         4.53 cm   (3.9-5.9cm) LVIDs:         4.28 cm LV  Mass Index: 49.8 g/m2 LV % FS        5.5 % LA VOLUME:                               Normal Ranges: LA Vol A4C:        61.8 ml    (22+/-6mL/m2) LA Vol A2C:        85.1 ml LA Vol BP:         73.0 ml LA Vol Index A4C:  37.4ml/m2 LA Vol Index A2C:  51.6 ml/m2 LA Vol Index BP:   44.3 ml/m2 LA Area A4C:       21.9 cm2 LA Area A2C:       25.9 cm2 LA Major Axis A4C: 6.6 cm LA Major Axis A2C: 6.7 cm LA Vol A4C:        60.5 ml LA Vol A2C:        87.5 ml RA VOLUME BY A/L METHOD:                       Normal Ranges: RA Area A4C: 23.8 cm2 AORTA MEASUREMENTS:                    Normal Ranges: Ao STJ, d: 2.30 cm (1.7-3.4cm) Asc Ao, d: 3.50 cm (2.1-3.4cm) LV SYSTOLIC FUNCTION BY 2D PLANIMETRY (MOD):                     Normal Ranges: EF-A4C View: 17.1 % (>=55%) EF-A2C View: 18.2 % EF-Biplane:  16.4 % LV DIASTOLIC FUNCTION:                           Normal Ranges: MV Peak E:    0.62 m/s    (0.7-1.2 m/s) MV Peak A:    0.93 m/s    (0.42-0.7 m/s) E/A Ratio:    0.67        (1.0-2.2) MV e'         0.02 m/s    (>8.0) MV lateral e' 0.02 m/s MV medial e'  0.02 m/s MV A Dur:     108.47 msec E/e' Ratio:   31.13       (<8.0) MITRAL VALVE:                Normal Ranges: MV DT: 49 msec (150-240msec) MITRAL INSUFFICIENCY:                           Normal Ranges: PISA Radius:  0.5 cm MR VTI:       132.63 cm MR Vmax:      392.73 cm/s MR Alias Juan: 39.3 cm/s MR Volume:    25.17 ml MR Flow Rt:   74.55 ml/s MR EROA:      0.19 cm2 AORTIC VALVE:                         Normal Ranges: AoV Vmax:      0.85 m/s (<=1.7m/s) AoV Peak P.9 mmHg (<20mmHg) LVOT Max Juan:  0.59 m/s (<=1.1m/s) LVOT VTI:      10.39 cm LVOT Diameter: 1.96 cm  (1.8-2.4cm) AoV Area,Vmax: 2.12 cm2 (2.5-4.5cm2) AORTIC INSUFFICIENCY: AI Vmax:       3.14 m/s AI Half-time:  453 msec AI Decel Time: 1563 msec AI Decel Rate: 204.64 cm/s2  RIGHT VENTRICLE: RV Basal 4.36 cm RV Mid   3.40 cm RV Major 6.0 cm TAPSE:   14.8 mm RV s'    0.13 m/s TRICUSPID VALVE/RVSP:                              Normal Ranges: Peak TR Velocity: 2.89 m/s RV Syst Pressure: 41.3 mmHg (< 30mmHg) IVC Diam:         1.98 cm TV e'             0.1 m/s AORTA: Asc Ao Diam 3.46 cm  [id number] Wil Leiva MD Electronically signed on 11/28/2023 at 1:00:55 PM  ** Final **     XR chest 1 view    Result Date: 11/28/2023  Interpreted By:  Daniella Baker, STUDY: XR CHEST 1 VIEW;  11/28/2023 10:58 am   INDICATION: Signs/Symptoms:placement of central line.   COMPARISON: 11/27/2023   ACCESSION NUMBER(S): [id number]   ORDERING CLINICIAN: MITCH PADILLA   FINDINGS: Cardiac silhouette appears enlarged. There are atherosclerotic changes of the aorta.   There is bilateral parenchymal infiltration. There are bilateral pleural effusions.   Degenerative changes present on the right with tip in the region of the superior vena cava.   There are degenerative changes of the spine.   COMPARISON OF FINDING: The catheter was placed in the interval.       Interval placement of a right jugular catheter. Findings suggestive of congestive heart failure.   MACRO: none   Signed by: Daniella Baker 11/28/2023 11:06 AM Dictation workstation:   [id number]    CARRIE without exercise    Result Date: 11/28/2023  Preliminary [place]iology [place] [address]      Phone [phone] Fax [phone]            Preliminary Vascular Lab Report  [facility] ANKLE BRACHIAL INDEX (CARRIE) WITHOUT EXERCISE  Patient Name:     SHAZIA Che Physician:  [id number] Maribell Rao MD Study Date:       11/28/2023     Ordering Provider:  [id number] KEON DELATORRE MRN/PID:          [id number]       Fellow: Accession#:       [id number]   Technologist:       Yasmin Alamo RVGISSELL YOB: 1935     Technologist 2: Gender:           F              Encounter#:         [id number] Admission Status: Inpatient      Location Performed: [facility]  Diagnosis/ICD: Peripheral vascular disease, unspecified-I73.9  PRELIMINARY  CONCLUSIONS:  Right Lower PVR: No evidence of arterial occlusive disease in the right lower extremity at rest. Triphasic flow is noted in the right common femoral artery, right posterior tibial artery and right dorsalis pedis artery. Left Lower PVR: No evidence of arterial occlusive disease in the left lower extremity at rest. Biphasic flow is noted in the left posterior tibial artery. Triphasic flow is noted in the left common femoral artery and left dorsalis pedis artery. Additional Findings: Technically limited due to movement, cooperation, pain tolerance to cuffs.  Imaging & Doppler Findings:  RIGHT Lower PVR                Pressures Ratios Right Posterior Tibial (Ankle) 108 mmHg  1.11 Right Dorsalis Pedis (Ankle)   104 mmHg  1.07 Right Digit (Great Toe)        65 mmHg   0.67   LEFT Lower PVR                Pressures Ratios Left Posterior Tibial (Ankle) 109 mmHg  1.12 Left Dorsalis Pedis (Ankle)   111 mmHg  1.14 Left Digit (Great Toe)        45 mmHg   0.46                      Left Brachial Pressure 97 mmHg   VASCULAR PRELIMINARY REPORT completed by Yasmin Alamo [facility] on 11/28/2023 at 7:44:46 AM  ** Final **     XR chest 1 view    Result Date: 11/28/2023  Interpreted By:  Alfredito Rivera, STUDY: XR CHEST 1 VIEW;  11/27/2023 11:55 pm   INDICATION: Signs/Symptoms:CHF.   COMPARISON: 08/21/2022   ACCESSION NUMBER(S): [id number]   ORDERING CLINICIAN: KEON DELATORRE   FINDINGS: The heart is enlarged The pulmonary vasculature is congested centrally and indistinct peripherally, with interlobular septal thickening and indistinct mid to lower lung consolidative opacities consistent with pulmonary edema. There are large bilateral pleural effusions.       Severe pulmonary edema and large bilateral pleural effusions.     MACRO: None.   Signed by: Alfredito Rivera 11/28/2023 2:16 AM Dictation workstation:   [id number]    CT angio lower extremity left w and or wo IV contrast    Result Date: 11/26/2023  INDICATION:   Multiple left foot wounds with purple discoloration and cold foot.  Assess for arterial occlusion. TECHNIQUE:  CTA images were obtained through the left lower extremity without and with intravenous contrast.   Omnipaque 350, 100 mL was administered intravenously.  Images are reviewed and processed at a workstation according to the CT angiogram protocol with 3-D and/or MIP post processing imaging generated. Automated mA/kV exposure control was utilized and patient examination was performed in strict accordance with principles of ALARA. COMPARISON:  US venous left lower extremity 10/09/2023.  XR left foot 10/09/2023. FINDINGS:  Limited imaging of the abdomen demonstrates an IVC filter.  There is extensive atherosclerotic disease and mural thrombus in the abdominal aorta there is a mild stenosis of the inferior mesenteric artery.  The left renal artery is not fully evaluated but does not appear to have flow limiting stenosis.  There is no evidence of abdominal aortic aneurysmal disease. There is colonic diverticulosis without evidence of diverticulitis. . This been previous hysterectomy.  Sclerotic lesion is seen in the right iliac bones.  Plain film radiography is recommended.  Clinical correlation is advised. On the right: Common iliac artery is patent.  The external iliac artery is patent.  The right common femoral artery is not visualized remain in the right lower extremity was not studied. On the left: Common iliac artery is patent.  The external iliac artery is patent.  Common femoral artery is patent.  The superficial femoral artery is patent.  Popliteal artery is patent. Anterior tibial artery, tibioperoneal trunk, posterior tibial artery, peroneal artery, dorsalis pedis artery and pedal posterior tibial artery are patent. Soft tissues of the left lower extremity are diffusely thickened consistent with advanced edema.  The musculature is poorly defined due to the edema which extends into muscular compartments.   Underlying cellulitis is not excluded.  There is a left knee joint effusion. Degenerative changes are noted at the left knee.    1. IVC filter in place. 2. Extensive atherosclerotic disease of the abdominal aorta without evidence of aneurysmal disease. 3. Mild stenosis of the inferior mesenteric artery.  4. Extensive cellulitis or soft tissue edema involving the entire left lower extremity. On the right: 1.  No evidence of iliac artery stenosis. 2.  The right common femoral artery was not studied. On the left: 1.  Cellulitis or soft tissue edema involving the entire left lower extremity. 2.  No evidence of arterial occlusion. Signed by Alfredito Artis M.D.       Lab Results  Results for orders placed or performed during the hospital encounter of 11/26/23 (from the past 24 hour(s))   CBC   Result Value Ref Range    WBC      nRBC      RBC      Hemoglobin      Hematocrit      MCV      MCH      MCHC      RDW      Platelets     Basic Metabolic Panel   Result Value Ref Range    Glucose 62 (L) 74 - 99 mg/dL    Sodium 134 (L) 136 - 145 mmol/L    Potassium 3.5 3.5 - 5.3 mmol/L    Chloride 101 98 - 107 mmol/L    Bicarbonate 16 (L) 21 - 32 mmol/L    Anion Gap 21 (H) 10 - 20 mmol/L    Urea Nitrogen 28 (H) 6 - 23 mg/dL    Creatinine 1.42 (H) 0.50 - 1.05 mg/dL    eGFR 36 (L) >60 mL/min/1.73m*2    Calcium 8.4 (L) 8.6 - 10.3 mg/dL   CBC   Result Value Ref Range    WBC 9.6 4.4 - 11.3 x10*3/uL    nRBC 0.0 0.0 - 0.0 /100 WBCs    RBC 4.78 4.00 - 5.20 x10*6/uL    Hemoglobin 12.2 12.0 - 16.0 g/dL    Hematocrit 38.9 36.0 - 46.0 %    MCV 81 80 - 100 fL    MCH 25.5 (L) 26.0 - 34.0 pg    MCHC 31.4 (L) 32.0 - 36.0 g/dL    RDW 21.7 (H) 11.5 - 14.5 %    Platelets 241 150 - 450 x10*3/uL        Medications  Scheduled medications:  amiodarone, 200 mg, oral, Daily  [Held by provider] apixaban, 2.5 mg, oral, BID  [Held by provider] carvedilol, 6.25 mg, oral, BID  clindamycin, 900 mg, intravenous, q8h  [Held by provider] furosemide, 80 mg, intravenous,  BID  magnesium oxide, 400 mg, oral, Daily  meropenem, 1,000 mg, intravenous, q12h  mirtazapine, 7.5 mg, oral, Nightly  polyethylene glycol, 17 g, oral, Daily  vancomycin, 750 mg, intravenous, q24h  zinc oxide, 1 Application, Topical, BID      Continuous medications:  D5 % and 0.9 % sodium chloride, 30 mL/hr, Last Rate: 30 mL/hr (11/30/23 1149)      PRN medications:  PRN medications: acetaminophen **OR** acetaminophen **OR** acetaminophen, LORazepam, ondansetron **OR** ondansetron, oxygen     Physical Exam  Constitutional:       General: She is not in acute distress.     Appearance: Normal appearance.      Comments: Sleepy, awakens and is pleasantly confused.    HENT:      Head: Normocephalic and atraumatic.      Right Ear: External ear normal.      Left Ear: External ear normal.      Nose: Nose normal.      Mouth/Throat:      Mouth: Mucous membranes are moist.      Pharynx: Oropharynx is clear.   Eyes:      Extraocular Movements: Extraocular movements intact.      Conjunctiva/sclera: Conjunctivae normal.      Pupils: Pupils are equal, round, and reactive to light.   Cardiovascular:      Rate and Rhythm: Normal rate and regular rhythm.      Pulses: Normal pulses.      Heart sounds: Normal heart sounds.   Pulmonary:      Effort: Pulmonary effort is normal. No respiratory distress.      Breath sounds: Normal breath sounds. No wheezing, rhonchi or rales.   Abdominal:      General: Abdomen is flat. Bowel sounds are normal.      Palpations: Abdomen is soft.      Tenderness: There is no abdominal tenderness. There is no right CVA tenderness, left CVA tenderness, guarding or rebound.   Musculoskeletal:         General: No swelling. Normal range of motion.      Cervical back: Normal range of motion and neck supple.      Right lower leg: Edema (2-3+) present.      Left lower leg: Edema (3-4+) present.   Skin:     General: Skin is warm and dry.      Capillary Refill: Capillary refill takes less than 2 seconds.      Findings:  Lesion present. No rash.      Comments: L dorsal foot with two areas of purulence and dense surrounding erythema  L heel with skin slough       Neurological:      Mental Status: She is disoriented.   Psychiatric:         Mood and Affect: Mood normal.         Behavior: Behavior normal.                  Code Status  DNR and No Intubation     Assessment/Plan   This patient currently has cardiac telemetry ordered; if you would like to modify or discontinue the telemetry order, click here to go to the orders activity to modify/discontinue the order.    Cellulitis of left leg  Assessment & Plan  Had failed 2 rounds of outpatient Keflex  Wound culture  Start on broad-spectrum antibiotics per ID  ID consultation  CARRIE without ischemia  MRI without OM or abscess  CRP 12+  Podiatry consultation appreciated  Local wound care  Hospice consult- plan dc to home with hospice on 11/30/23  NO ESCALATION OR DEESCALATION OF CARE TODAY    Acute systolic heart failure (CMS/HCC)  Assessment & Plan  Hold home medications for right now although patient is edematous she is tachycardic and with lactic acidosis  Monitor BP and adjust as necessary  She remains on RA  Monitor volume status closely  Appreciate cardiology recommendations  Appreciate ICU monitoring   Echo from 11/29/23: EF 10-15%, grade 1 diastolic dysfunction, reduced right ventricular systolic function, mild to moderate mitral valve regurgitation, moderate to severe tricuspid valve regurgitation, moderate aortic valve regurgitation, and a large pleural effusion.   Cardiology offered L/RHC- family wishes for hospice care    * Shock (CMS/HCC)  Assessment & Plan  2/2 Sepsis  Off IV vasopressors since 11/28/23  ICU consult appreciated  Continue to hold BP medications- can start midodrine if further hypotension    History of DVT of lower extremity  Assessment & Plan  IVC filter in place  Continue home Eliquis  No evidence of blood clot at this time    History of non-Hodgkin's  lymphoma  Assessment & Plan  In remission since 2005    Aspiration into airway  Assessment & Plan  Regular diet per SLP    Paroxysmal atrial fibrillation (CMS/HCC)  Assessment & Plan  Continue home Eliquis  Continue rate control as per home  Monitor on telemetry    Weakness  Assessment & Plan  PT/OT           Malnutrition Diagnosis Status: New  Malnutrition Diagnosis: Moderate malnutrition related to chronic disease or condition  As Evidenced by: moderate muslce and fat losses, energy intakes <75% estimated needs x 1 month  I agree with the dietitian's malnutrition diagnosis.      DVT ppx: Home Eliquis       Please see orders for more complete plan    Eveline Artis PA-C

## 2023-11-30 NOTE — PROGRESS NOTES
Palliative Care Follow-Up Progress Note    Patricia Jewell is a 88 y.o. female on day 4 of admission presenting with left lower extremity erythema, swelling, and purulence. She was found to have left leg cellulitis, acute encephalopathy, likely aspiration, Afib with RVR, acute systolic heart failure, and shock prompting a transfer to the ICU and placed on vasopressors. TTE showed severe left ventricular systolic function 10-15% estimated ejection fraction.      11/30/2023: Patient seen and examined, no visitors at bedside. Patient sleepy. Family has met with [facility] with plans to discharge home tomorrow with hospice admission.     Symptom Assessment  Symptoms currently managed Yes  Agitation/Anxiety: Patient has been ordered PRN IV ativan, unable to use haldol d/t prolonged QTc  Poor appetite, dysphagia: transitioning to comfort care, uncertain ability to consistently take PO or swallow pills     Physical Exam  Constitutional:       General: She is not in acute distress.     Appearance: She is ill-appearing.      Comments: Sleepy   HENT:      Head: Normocephalic.      Nose: Nose normal.      Mouth/Throat:      Mouth: Mucous membranes are moist.   Eyes:      General: No scleral icterus.  Cardiovascular:      Rate and Rhythm: Normal rate.   Pulmonary:      Effort: Pulmonary effort is normal.   Musculoskeletal:      Cervical back: Neck supple.   Neurological:      Mental Status: She is disoriented.       Plan     Interventions for Symptom Management   Agitation/anxiety: continue PRN IV ativan. Unable to use Haldol d/t prolonged QTc    Follow up re: Goals of Care and Advanced Directives   Goals of care: continue current level of care and discharge home with hospice tomorrow  Advanced Directives: jeronimo José Miguel PATEL, copy placed on hospital chart    Outpatient services: Home with [facility] planned for tomorrow      Collaborated with: Juli NIXON, Dr. Hassan, [facility], and Elias JONES I  spent 15 minutes in the professional and overall care of this patient.    Ted Hernandez, REBECA-CNP

## 2023-11-30 NOTE — PROGRESS NOTES
Nutrition Progress Note    11/30:  Pt to discharge home tomorrow under Hospice care.  Pt to have Pleasure diet as tolerated. Pt asleep, unable to take PO today per RN, and family at bedside.  Will send desired items.    Will follow per Comfort Care

## 2023-11-30 NOTE — CONSULTS
Reason For Consult  Cellulitis left lower leg, ulcers left foot    History Of Present Illness  Patricia Jewell is a 88 y.o. female presenting with cellulitis left lower leg from an ulcer on the dorsal left foot and heel. Her daughter reports that patient has a chronic scar from a childhood injury. She had recent swelling in the leg that caused the scar to develop a wound. Patient was treating the wound with aid from her daughter. Patient was very adamant she did not wish to seek treatment and refused to go to the doctor. Patient sits for long periods in her chair with her feet on the ground and she also developed a pressure injury to the left heel. Daughter brought patient to her home to care for her and noted her leg was obviously infected and patient was confused, so she brought her to ER.      Past Medical History  She has a past medical history of Fracture of left shoulder girdle, part unspecified, subsequent encounter for fracture with delayed healing, History of falling, Personal history of non-Hodgkin lymphomas, Personal history of other venous thrombosis and embolism, and Personal history of pulmonary embolism.    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     Social History  She reports that she quit smoking about 16 years ago. Her smoking use included cigarettes. She has a 10.00 pack-year smoking history. She has been exposed to tobacco smoke. She has never used smokeless tobacco. She reports that she does not currently use alcohol. She reports that she does not currently use drugs.    Family History  Family History   Problem Relation Name Age of Onset    Hypertension Mother      Hyperlipidemia Mother       "Hypertension Father      Hyperlipidemia Father      Esophageal cancer Brother          Allergies  Patient has no known allergies.    Review of Systems  Patient awake. Oriented to self only.   Family at bedside.      Physical Exam  Stage 2 pressure injury to left heel. Currently stable. No sign of infection at that site. Blister previously deroofed prior to admission.   Ulceration on dorsum of left foot. Dry fibrous eschar over a chronic scar. NO drainage. No evident abscess. Cellulitis is resolving. Leg is indurated form edema.   Adequate perfusion to foot and all toes.   No wounds on the right lower extremity.      Last Recorded Vitals  Blood pressure 94/63, pulse 107, temperature 35.1 °C (95.2 °F), temperature source Temporal, resp. rate 24, height 1.63 m (5' 4.17\"), weight 62.9 kg (138 lb 10.7 oz), SpO2 96 %.    Relevant Results    MR foot left wo IV contrast 11/28/2023    Narrative  Interpreted By:  Aubrey Baker,  STUDY:  MR FOOT LEFT WO IV CONTRAST;    INDICATION:  Signs/Symptoms:Nonhealing wound.    COMPARISON:  Plain film radiographs of the left foot October 9, 2023    ACCESSION NUMBER(S):  [id number]    ORDERING CLINICIAN:  KEON DELATORRE    TECHNIQUE:  Routine multiplanar multisequential MRI left foot without contrast    FINDINGS:  There is generalized subcutaneous edema of the entirety of the  visualized left forefoot from the dorsum of the toes to the distal  ankle.    There is no evidence of a discrete focal fluid collection.    No definite large skin wound seen.    There is no evidence of osteomyelitis.    There is no evidence of fracture.    No marrow replacement lesions seen.    Some mild midfoot osteoarthritis noted. Mild arthrosis of the 1st MTP.    No acute tendinous or ligamentous abnormality.    No evidence of a soft tissue mass.    No sizeable effusions.    No large chondral defects.    Fatty atrophy and edema of the intrinsic foot musculature favored to  represent chronic ischemic " myopathy.    Impression  Nonspecific subcutaneous edema of the left foot favoring cellulitis.    No findings to suggest osteomyelitis.    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    Signed by: Aubrey Baker 11/28/2023 5:09 PM  Dictation workstation:   [id number]   Scheduled medications  amiodarone, 200 mg, oral, Daily  [Held by provider] apixaban, 2.5 mg, oral, BID  [Held by provider] carvedilol, 6.25 mg, oral, BID  clindamycin, 900 mg, intravenous, q8h  [Held by provider] furosemide, 80 mg, intravenous, BID  magnesium oxide, 400 mg, oral, Daily  meropenem, 1,000 mg, intravenous, q12h  mirtazapine, 7.5 mg, oral, Nightly  polyethylene glycol, 17 g, oral, Daily  vancomycin, 750 mg, intravenous, q24h  zinc oxide, 1 Application, Topical, BID      Continuous medications  DOPamine, 1-5 mcg/kg/min, Last Rate: 2.5 mcg/kg/min (11/30/23 0700)  norepinephrine in sodium chloride 0.9 %, 0.05-1 mcg/kg/min, Last Rate: Stopped (11/28/23 1430)      PRN medications  PRN medications: acetaminophen **OR** acetaminophen **OR** acetaminophen, ondansetron **OR** ondansetron, oxygen  Results for orders placed or performed during the hospital encounter of 11/26/23 (from the past 24 hour(s))   Blood Gas Venous Full Panel   Result Value Ref Range    POCT pH, Venous 7.32 (L) 7.33 - 7.43 pH    POCT pCO2, Venous 45 41 - 51 mm Hg    POCT pO2, Venous 35 35 - 45 mm Hg    POCT SO2, Venous 44 (L) 45 - 75 %    POCT Oxy Hemoglobin, Venous 43.0 (L) 45.0 - 75.0 %    POCT Hematocrit Calculated, Venous 34.0 (L) 36.0 - 46.0 %    POCT Sodium, Venous 132 (L) 136 - 145 mmol/L    POCT Potassium, Venous 3.3 (L) 3.5 - 5.3 mmol/L    POCT Chloride, Venous 98 98 - 107 mmol/L    POCT Ionized Calicum, Venous 1.12 1.10 - 1.33 mmol/L    POCT Glucose, Venous 59 (L) 74 - 99 mg/dL    POCT Lactate, Venous 1.8 0.4 - 2.0 mmol/L    POCT Base Excess, Venous -3.0 (L) -2.0 - 3.0 mmol/L    POCT HCO3 Calculated, Venous 23.2 22.0 - 26.0 mmol/L    POCT  Hemoglobin, Venous 11.4 (L) 12.0 - 16.0 g/dL    POCT Anion Gap, Venous 14.0 10.0 - 25.0 mmol/L    Patient Temperature 37.0 degrees Celsius    FiO2 21 %   CBC   Result Value Ref Range    WBC      nRBC      RBC      Hemoglobin      Hematocrit      MCV      MCH      MCHC      RDW      Platelets     Basic Metabolic Panel   Result Value Ref Range    Glucose 62 (L) 74 - 99 mg/dL    Sodium 134 (L) 136 - 145 mmol/L    Potassium 3.5 3.5 - 5.3 mmol/L    Chloride 101 98 - 107 mmol/L    Bicarbonate 16 (L) 21 - 32 mmol/L    Anion Gap 21 (H) 10 - 20 mmol/L    Urea Nitrogen 28 (H) 6 - 23 mg/dL    Creatinine 1.42 (H) 0.50 - 1.05 mg/dL    eGFR 36 (L) >60 mL/min/1.73m*2    Calcium 8.4 (L) 8.6 - 10.3 mg/dL   CBC   Result Value Ref Range    WBC 9.6 4.4 - 11.3 x10*3/uL    nRBC 0.0 0.0 - 0.0 /100 WBCs    RBC 4.78 4.00 - 5.20 x10*6/uL    Hemoglobin 12.2 12.0 - 16.0 g/dL    Hematocrit 38.9 36.0 - 46.0 %    MCV 81 80 - 100 fL    MCH 25.5 (L) 26.0 - 34.0 pg    MCHC 31.4 (L) 32.0 - 36.0 g/dL    RDW 21.7 (H) 11.5 - 14.5 %    Platelets 241 150 - 450 x10*3/uL        Assessment/Plan     Cellulitis left lower leg from ulcer on dorsal left foot  Stage 2 pressure injury of left heel  -Cellulitis appears to be resolving.   -MRI reviewed. No abscess or osteomyelitis  -Discussed thoroughly with family at bedside.   -No intervention other than dressing changes and antibiotics.     Reji Larkin DPM

## 2023-11-30 NOTE — PROGRESS NOTES
[facility] Cardiology    Patient Name: Patricia Jewell  Patient : 1935  Room/Bed: 15/15-A    Date: 23  Time: 10:47 AM    Referred by Dr. German ref. provider found for left foot swelling/ infection     Subjective:  Patient not currently on IV vasopressor support although blood pressure have been mildly hypotensive at times.  Echocardiogram done yesterday showing severely reduced left ventricular systolic function with an ejection fraction of 10 to 15%, grade 1 diastolic dysfunction, reduced right ventricular systolic function, mild to moderate mitral valve regurgitation, moderate to severe tricuspid valve regurgitation, moderate aortic valve regurgitation, and a large pleural effusion.  Discussed condition with patient's daughter who was present in the room during my exam.  I discussed options such as left and right heart catheterization and the possibility of additional cardiac interventions.  The daughter reports she plans to discuss possible palliative care later this morning and wanted to defer decision-making until that time.  Patient is on apixaban which would need to be held prior to any invasive procedures.  23: Spoke again with daughter and grandson.  They met with palliative care yesterday and are planning to have a family meeting today with hospice and would like to go home with hospice if possible.  They were still considering left and right heart catheterizations but after a long discussion, they opted for more conservative management.  Patient has had no new issues overnight.  She is responsive but not communicating.  She appears somewhat restless but does not look to be uncomfortable.  Monitor shows A-fib.  During the night, her pressure dropped and she is on pressors and her blood pressure medications are on hold.    Assessment/Plan:   1.  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  Chest x-ray showing severe pulmonary edema and large bilateral pleural effusions.  Vitals report showing periods of tachypnea this morning with respiratory rates as high as 37.  Blood pressures have been hypotensive at times requiring vasopressor support.  I will start the patient on a furosemide infusion for diuresis.  Would continue IV vasopressors for blood pressure support.  Echocardiogram done November 2022 showed low normal left ventricular systolic function with an ejection fraction of 50 to 55%, grade 1 diastolic dysfunction, moderate to severe tricuspid valve regurgitation, and mild to moderate aortic valve regurgitation.   Repeat echocardiogram has been ordered.  Continue to monitor urine output, renal function, and serum electrolytes while here.    11/29: Patient appears volume overloaded on physical exam.  Again, she appears to need diuresis which was held yesterday secondary to ongoing hypotension.  Echocardiogram done yesterday showing severely reduced left ventricular systolic function with an ejection fraction of 10 to 15%, grade 1 diastolic dysfunction, reduced right ventricular systolic function, mild to moderate mitral valve regurgitation, moderate to severe tricuspid valve regurgitation, moderate aortic valve regurgitation, and a large pleural effusion.  Discussed condition with patient's daughter who was present in the room during my exam.  I discussed options such as left and right heart catheterization and the possibility of additional cardiac interventions.  The daughter reports she plans to discuss possible palliative care later this morning and wanted to defer decision-making until that time.  Patient is on apixaban which would need to be held prior to any invasive procedures.  11-30-23: Carvedilol and furosemide are on hold due to hypotension.  Apixaban was placed on hold while considering invasive workup but it can be resumed if no plan  for thoracentesis.  Hospice meeting is this morning.    2.  Paroxysmal atrial fibrillation  Patient has a history of paroxysmal atrial fibrillation and has been on apixaban and amiodarone at home.  Patient's daughter reports medication noncompliance at home.  Telemetry currently showing heart rates in the upper 90s to low 100s.  Would continue with current medical therapy.    11/29: Telemetry showing controlled heart rates.  Would continue current medical therapy.  Will need to hold apixaban if family decides they want to proceed with further invasive evaluation as discussed above.  Patient's daughter plans to meet with palliative care later this morning.  11-30-23: A-fib on telemetry, heart rates in the low 100s.  If her blood pressure improves, would recommend restarting a low-dose of carvedilol.  Anticoagulation could also resume if no plan for invasive procedure.    3.  Tricuspid valve regurgitation  Patient has a history of moderate to severe tricuspid valve regurgitation and appears to be in acutely decompensated heart failure.  Repeat echocardiogram has been ordered.    11/29: Echocardiogram done yesterday showing severely reduced left ventricular systolic function with an ejection fraction of 10 to 15%, grade 1 diastolic dysfunction, reduced right ventricular systolic function, mild to moderate mitral valve regurgitation, moderate to severe tricuspid valve regurgitation, moderate aortic valve regurgitation, and a large pleural effusion.     4.  Coronary artery disease  The patient has a history of nonobstructive coronary artery disease as seen on cardiac catheterization done in August 2022.    11/29: I discussed possible left and right heart catheterization with the patient's daughter given her severely reduced LV function.  Patient daughter reports she plans to meet with palliative care later this morning and defer decision-making until that time.  Patient is on apixaban which would need to be held prior to  any invasive procedures.  11-30-23: As noted above, the family is leaning towards hospice and possibly home hospice.  We can follow-up tomorrow to see what their decision will await a final decision but it does not appear that an invasive workup would make a big difference in this case.    5.  Left foot cellulitis/sepsis  Patient on antibiotic therapy per infectious disease.  Currently on IV norepinephrine for blood pressure support.    11/29: Patient not currently on IV vasopressor support although with hypotension at times.  Management has been through infectious disease and the critical care service.    Past Medical History:  She has a past medical history of Fracture of left shoulder girdle, part unspecified, subsequent encounter for fracture with delayed healing, History of falling, Personal history of non-Hodgkin lymphomas, Personal history of other venous thrombosis and embolism, and Personal history of pulmonary embolism.    Past Surgical History:  She has a past surgical history that includes Other surgical history (09/14/2016); Hysterectomy (09/14/2016); Other surgical history (09/14/2016); Cataract extraction (10/10/2018); Hernia repair (10/05/2016); XR chest pacemaker w fluoro (3/16/2023); and CT angio lower extremity left w and or wo IV contrast (Left, 11/26/2023).      Social History:  She reports that she quit smoking about 16 years ago. Her smoking use included cigarettes. She has a 10.00 pack-year smoking history. She has been exposed to tobacco smoke. She has never used smokeless tobacco. She reports that she does not currently use alcohol. She reports that she does not currently use drugs.    Family History:  Family History   Problem Relation Name Age of Onset    Hypertension Mother      Hyperlipidemia Mother      Hypertension Father      Hyperlipidemia Father      Esophageal cancer Brother          Allergies:  Patient has no known allergies.    Outpatient Medications:  Current Outpatient  Medications   Medication Instructions    acetaminophen (TYLENOL) 325 mg, oral, Every 6 hours PRN    amiodarone (PACERONE) 200 mg, oral, Daily    carvedilol (Coreg) 12.5 mg tablet 1.5 tablets, oral, 2 times daily    Eliquis 2.5 mg, oral, 2 times daily    Farxiga 10 mg, oral, Daily before breakfast    furosemide (LASIX) 40 mg, oral, Daily    magnesium (as gluconate) (MAGONATE) 27 mg, oral, Daily    potassium chloride CR 20 mEq ER tablet 20 mEq, oral, Daily, Do not crush or chew.    sacubitriL-valsartan (Entresto) 49-51 mg tablet 1 tablet, oral, 2 times daily    spironolactone (ALDACTONE) 25 mg, oral, Daily        ROS:  A 14 point review of systems was done and is negative other than as stated in HPI    Vitals:  Vitals:    11/30/23 0645 11/30/23 0700 11/30/23 0758 11/30/23 0800   BP: 117/57 118/88  94/63   BP Location:       Patient Position:       Pulse: 92 101  107   Resp: 20 22  24   Temp:   35.1 °C (95.2 °F)    TempSrc:   Temporal    SpO2: 93% 99%  96%   Weight:       Height:           Physical Exam:     Constitutional: Confused, unable to appropriately answer questions, opens eyes to stimulation  Skin: Skin color, texture, turgor normal. No rashes or lesions.  Head: Normocephalic. No masses, lesions, tenderness or abnormalities  Eyes: Extraocular movements are grossly intact.  Mouth and throat: Mucous membranes moist  Neck: Neck supple, no carotid bruits, no JVD  Respiratory: Rales bilaterally, mildly tachypneic  Chest wall: No scars, normal excursion with respiration  Cardiovascular: Regular rhythm with + murmur  Gastrointestinal: Abdomen soft, nontender. Bowel sounds normal.  Extremities: + pitting edema  Restless    Intake/Output:   I/O last 2 completed shifts:  In: 555.6 (8.8 mL/kg) [I.V.:21.9 (0.3 mL/kg); IV Piggyback:533.7]  Out: 280 (4.5 mL/kg) [Urine:280 (0.2 mL/kg/hr)]  Weight: 62.9 kg     Outpatient Medications        Scheduled medications  amiodarone, 200 mg, oral, Daily  [Held by provider] apixaban,  2.5 mg, oral, BID  [Held by provider] carvedilol, 6.25 mg, oral, BID  clindamycin, 900 mg, intravenous, q8h  [Held by provider] furosemide, 80 mg, intravenous, BID  magnesium oxide, 400 mg, oral, Daily  meropenem, 1,000 mg, intravenous, q12h  mirtazapine, 7.5 mg, oral, Nightly  polyethylene glycol, 17 g, oral, Daily  vancomycin, 750 mg, intravenous, q24h  zinc oxide, 1 Application, Topical, BID      Continuous medications  D5 % and 0.9 % sodium chloride, 30 mL/hr      Recent Labs: (past 2 days)  Recent Results (from the past 48 hour(s))   Blood Gas Venous Full Panel    Collection Time: 11/28/23 11:17 AM   Result Value Ref Range    POCT pH, Venous 7.32 (L) 7.33 - 7.43 pH    POCT pCO2, Venous 44 41 - 51 mm Hg    POCT pO2, Venous 45 35 - 45 mm Hg    POCT SO2, Venous 58 45 - 75 %    POCT Oxy Hemoglobin, Venous 57.6 45.0 - 75.0 %    POCT Hematocrit Calculated, Venous 32.0 (L) 36.0 - 46.0 %    POCT Sodium, Venous 131 (L) 136 - 145 mmol/L    POCT Potassium, Venous 3.4 (L) 3.5 - 5.3 mmol/L    POCT Chloride, Venous 98 98 - 107 mmol/L    POCT Ionized Calicum, Venous 1.11 1.10 - 1.33 mmol/L    POCT Glucose, Venous 83 74 - 99 mg/dL    POCT Lactate, Venous 1.7 0.4 - 2.0 mmol/L    POCT Base Excess, Venous -3.3 (L) -2.0 - 3.0 mmol/L    POCT HCO3 Calculated, Venous 22.7 22.0 - 26.0 mmol/L    POCT Hemoglobin, Venous 10.5 (L) 12.0 - 16.0 g/dL    POCT Anion Gap, Venous 14.0 10.0 - 25.0 mmol/L    Patient Temperature 37.0 degrees Celsius    FiO2 21 %   MRSA Surveillance for Vancomycin De-escalation, PCR    Collection Time: 11/28/23 11:17 AM    Specimen: Anterior Nares; Swab   Result Value Ref Range    MRSA PCR Not Detected Not Detected   Transthoracic Echo (TTE) Complete    Collection Time: 11/28/23 11:32 AM   Result Value Ref Range    LVOT diam 1.96     LV biplane EF 16     MV E/A ratio 0.67     MV avg E/e' ratio 31.13     Tricuspid annular plane systolic excursion 1.5     LA vol index A/L 44.3     AV pk anthony 0.85     RV free wall pk S'  12.88     RVSP 41.3     LVIDd 4.53     Aortic Valve Area by Continuity of Peak Velocity 2.12     AV pk grad 2.9     LV A4C EF 17.1    CBC    Collection Time: 11/29/23  5:48 AM   Result Value Ref Range    WBC 7.2 4.4 - 11.3 x10*3/uL    nRBC 0.0 0.0 - 0.0 /100 WBCs    RBC 4.44 4.00 - 5.20 x10*6/uL    Hemoglobin 11.3 (L) 12.0 - 16.0 g/dL    Hematocrit 36.2 36.0 - 46.0 %    MCV 82 80 - 100 fL    MCH 25.5 (L) 26.0 - 34.0 pg    MCHC 31.2 (L) 32.0 - 36.0 g/dL    RDW 21.2 (H) 11.5 - 14.5 %    Platelets 219 150 - 450 x10*3/uL   Basic Metabolic Panel    Collection Time: 11/29/23  5:48 AM   Result Value Ref Range    Glucose 71 (L) 74 - 99 mg/dL    Sodium 135 (L) 136 - 145 mmol/L    Potassium 3.5 3.5 - 5.3 mmol/L    Chloride 100 98 - 107 mmol/L    Bicarbonate 25 21 - 32 mmol/L    Anion Gap 14 10 - 20 mmol/L    Urea Nitrogen 29 (H) 6 - 23 mg/dL    Creatinine 1.12 (H) 0.50 - 1.05 mg/dL    eGFR 48 (L) >60 mL/min/1.73m*2    Calcium 8.1 (L) 8.6 - 10.3 mg/dL   Blood Gas Venous Full Panel    Collection Time: 11/29/23 12:23 PM   Result Value Ref Range    POCT pH, Venous 7.32 (L) 7.33 - 7.43 pH    POCT pCO2, Venous 45 41 - 51 mm Hg    POCT pO2, Venous 35 35 - 45 mm Hg    POCT SO2, Venous 44 (L) 45 - 75 %    POCT Oxy Hemoglobin, Venous 43.0 (L) 45.0 - 75.0 %    POCT Hematocrit Calculated, Venous 34.0 (L) 36.0 - 46.0 %    POCT Sodium, Venous 132 (L) 136 - 145 mmol/L    POCT Potassium, Venous 3.3 (L) 3.5 - 5.3 mmol/L    POCT Chloride, Venous 98 98 - 107 mmol/L    POCT Ionized Calicum, Venous 1.12 1.10 - 1.33 mmol/L    POCT Glucose, Venous 59 (L) 74 - 99 mg/dL    POCT Lactate, Venous 1.8 0.4 - 2.0 mmol/L    POCT Base Excess, Venous -3.0 (L) -2.0 - 3.0 mmol/L    POCT HCO3 Calculated, Venous 23.2 22.0 - 26.0 mmol/L    POCT Hemoglobin, Venous 11.4 (L) 12.0 - 16.0 g/dL    POCT Anion Gap, Venous 14.0 10.0 - 25.0 mmol/L    Patient Temperature 37.0 degrees Celsius    FiO2 21 %   CBC    Collection Time: 11/30/23  4:32 AM   Result Value Ref Range     WBC      nRBC      RBC      Hemoglobin      Hematocrit      MCV      MCH      MCHC      RDW      Platelets     Basic Metabolic Panel    Collection Time: 11/30/23  6:38 AM   Result Value Ref Range    Glucose 62 (L) 74 - 99 mg/dL    Sodium 134 (L) 136 - 145 mmol/L    Potassium 3.5 3.5 - 5.3 mmol/L    Chloride 101 98 - 107 mmol/L    Bicarbonate 16 (L) 21 - 32 mmol/L    Anion Gap 21 (H) 10 - 20 mmol/L    Urea Nitrogen 28 (H) 6 - 23 mg/dL    Creatinine 1.42 (H) 0.50 - 1.05 mg/dL    eGFR 36 (L) >60 mL/min/1.73m*2    Calcium 8.4 (L) 8.6 - 10.3 mg/dL   CBC    Collection Time: 11/30/23  6:38 AM   Result Value Ref Range    WBC 9.6 4.4 - 11.3 x10*3/uL    nRBC 0.0 0.0 - 0.0 /100 WBCs    RBC 4.78 4.00 - 5.20 x10*6/uL    Hemoglobin 12.2 12.0 - 16.0 g/dL    Hematocrit 38.9 36.0 - 46.0 %    MCV 81 80 - 100 fL    MCH 25.5 (L) 26.0 - 34.0 pg    MCHC 31.4 (L) 32.0 - 36.0 g/dL    RDW 21.7 (H) 11.5 - 14.5 %    Platelets 241 150 - 450 x10*3/uL       CV Studies:  EKG:No results found for this or any previous visit (from the past 4464 hour(s)).  Echocardiogram:   Echocardiogram     [place] [id number]  Phone [phone] Fax [phone]    TRANSTHORACIC ECHOCARDIOGRAM REPORT      Patient Name:     SHAZIA Che Physician:  [id number] Nigel Schmid DO  Study Date:       11/15/2022     Referring           STEPHANE  Physician:          MANJIT  MRN/PID:          [id number]       PCP:  Accession/Order#: [id number]   [facility]  Location:  YOB: 1935     Fellow:  Gender:           F              Nurse:  Admit Date:       11/15/2022     Sonographer:        Kinga Bennett RD  Admission Status: Outpatient     Additional Staff:  Height:           157.48 cm      CC Report to:  Weight:           57.15 kg       Study Type:         Echocardiogram  BSA:              1.57 m2  Blood Pressure: 149 /54 mmHg    Diagnosis/ICD: 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      PHYSICIAN INTERPRETATION:  Left Ventricle: Left ventricular systolic function is low normal, with an estimated ejection fraction of 50-55%. There are no regional wall motion abnormalities. The left ventricular cavity size is normal. Spectral Doppler shows an impaired relaxation pattern of left ventricular diastolic filling.  Left Atrium: The left atrium is normal in size.  Right Ventricle: The right ventricle is normal in size. There is normal right ventricular global systolic function.  Right Atrium: The right atrium is normal in size.  Aortic Valve: The aortic valve is trileaflet. There is mild aortic valve cusp calcification. There is no evidence of aortic valve stenosis.  There is mild to moderate aortic valve regurgitation. The mean gradient of the aortic valve is 3.0 mmHg.  Mitral Valve: The mitral valve is normal in structure. There is mild mitral annular calcification. There is mild mitral valve regurgitation.  Tricuspid Valve: The tricuspid valve is structurally normal. There is moderate to severe tricuspid regurgitation. The Doppler estimated RVSP is moderate to severely elevated at 77.6 mmHg.  Pulmonic Valve: The pulmonic valve is structurally normal. There is trace to mild pulmonic valve regurgitation.  Pericardium: There is no pericardial effusion noted.  Aorta: The aortic root is normal.  Systemic Veins: The inferior vena cava appears to be of normal size. There is IVC inspiratory collapse greater than 50%.      CONCLUSIONS:  1. Left ventricular systolic function is low normal with a 50-55% estimated ejection fraction.  2. Spectral Doppler shows an impaired relaxation pattern  of left ventricular diastolic filling.  3. Moderate to severe tricuspid regurgitation.  4. Moderate to severely elevated right ventricular systolic pressure.  5. Aortic valve stenosis is not present.  6. Mild to moderate aortic valve regurgitation.    QUANTITATIVE DATA SUMMARY:  2D MEASUREMENTS:  Normal Ranges:  IVSd:          1.10 cm   (0.6-1.1cm)  LVPWd:         1.00 cm   (0.6-1.1cm)  LVIDd:         4.20 cm   (3.9-5.9cm)  LVIDs:         3.10 cm  LV Mass Index: 93.6 g/m2  LV % FS        26.2 %    LA VOLUME:  Normal Ranges:  LA Vol A4C:        46.1 ml    (22+/-6mL/m2)  LA Vol A2C:        46.3 ml  LA Vol BP:         46.7 ml  LA Vol Index A4C:  29.4ml/m2  LA Vol Index A2C:  29.5 ml/m2  LA Vol Index BP:   29.7 ml/m2  LA Area A4C:       16.8 cm2  LA Area A2C:       17.0 cm2  LA Major Axis A4C: 5.2 cm  LA Major Axis A2C: 5.3 cm  LA Volume Index:   29.1 ml/m2    RA VOLUME BY A/L METHOD:  Normal Ranges:  RA Area A4C: 16.2 cm2    M-MODE MEASUREMENTS:  Normal Ranges:  Ao Root: 3.30 cm (2.0-3.7cm)  AoV Exc: 1.90 cm (1.5-2.5cm)  LAs:     3.90 cm (2.7-4.0cm)    AORTA MEASUREMENTS:  Normal Ranges:  AoV Exc:     1.90 cm (1.5-2.5cm)  Ao Sinus, d: 3.10 cm (2.1-3.5cm)  Ao STJ, d:   2.50 cm (1.7-3.4cm)  Asc Ao, d:   3.20 cm (2.1-3.4cm)    LV SYSTOLIC FUNCTION BY 2D PLANIMETRY (MOD):  Normal Ranges:  EF-A4C View: 55.7 % (>=55%)  EF-A2C View: 51.8 %  EF-Biplane:  54.0 %    LV DIASTOLIC FUNCTION:  Normal Ranges:  MV Peak E:    0.64 m/s    (0.7-1.2 m/s)  MV Peak A:    0.79 m/s    (0.42-0.7 m/s)  E/A Ratio:    0.82        (1.0-2.2)  MV e'         0.06 m/s    (>8.0)  MV lateral e' 0.06 m/s  MV medial e'  0.05 m/s  MV A Dur:     148.00 msec  E/e' Ratio:   10.22       (<8.0)  LV IVRT:      114 msec    (<100ms)    MITRAL VALVE:  Normal Ranges:  MV DT: 204 msec (150-240msec)    MITRAL INSUFFICIENCY:  Normal Ranges:  MR VTI:  206.00 cm  MR Vmax: 573.00 cm/s    AORTIC VALVE:  Normal Ranges:  AoV Mean PG:             3.0 mmHg (1.7-11.5mmHg)  LVOT  Max Juan:            0.96 m/s (<=1.1m/s)  AoV VTI:                 25.70 cm (18-25cm)  LVOT VTI:                21.60 cm  LVOT Diameter:           2.00 cm  (1.8-2.4cm)  AoV Area, VTI:           2.64 cm2 (2.5-5.5cm2)  AoV Dimensionless Index: 0.84    AORTIC INSUFFICIENCY:  AI Vmax:      3.97 m/s  AI Half-time: 494 msec    RIGHT VENTRICLE:  RV 1   3.2 cm  RV 2   2.6 cm  RV 3   4.5 cm  TAPSE: 24.0 mm  RV s'  0.15 m/s    TRICUSPID VALVE/RVSP:  Normal Ranges:  Peak TR Velocity: 4.32 m/s  RV Syst Pressure: 77.6 mmHg (< 30mmHg)  IVC Diam:         1.30 cm        Cardiac Catheterization:   Adult Cath     [facility], Cath Lab  [address] [place] [address]  Phone [phone] Fax [phone]    Cardiovascular Catheterization Report    Patient Name:     SHAZIA FAJARDO Performing Physician: [id number] Jacinto MD  Study Date:       8/24/2022      Verifying Physician:  [id number] Jacinto MD  MRN/PID:          [id number]       Cardiologist:  Accession/Order#: [id number]      Referring Physician:  [id number] PRATEEK SINGH  YOB: 1935     Referring Physician:  Gender:           F              Referring Physician:      Study: Left and Right Heart Catheterization      Indications:  SHAZIA FAJARDO is a 87 year old female who presents with atrial fibrillation. Left ventricular dysfunction, with an asymptomatic chest pain assessment. Shortness of breath, systolic dysfunction. Study performed as an urgent cath procedure. Heart failure.    Medical History:  Stress test performed: Yes. CTA performed: Yes. Agatston accessed: No. LVEF Assessed: Yes. LVEF = 20%.    Procedure Description:  After infiltration with 2% Lidocaine, the right radial artery was cannulated with a modified Seldinger technique. Subsequently a 6 Trinidadian sheath was placed retrograde in the right radial artery. The arterial sheath was sized up to 6 Trinidadian. After infiltration of local anesthetic, the right cephalic vein vein was  cannulated with a percutaneous technique. A 5 Kazakh sheath was placed in the vein. Multiple injections of contrast were made into the left and right coronary arteries with angiograms recorded in multiple projections. Cardiac output was calculated via the Sonya method. After completion of the procedure, the arterial sheath was pulled and a TR Band Radial Compression Device was utilized to obtain patent hemostasis. Post-procedure, the venous sheath was pulled and pressure was applied to the site.    Coronary Angiography:  The coronary circulation is right dominant.    Left Main Coronary Artery:  The left main coronary artery is a normal caliber vessel. The left main arises normally from the left coronary sinus of Valsalva and bifurcates into the LAD and circumflex coronary arteries. The left main coronary artery showed no significant disease or stenosis greater than 30%.    Left Anterior Descending Coronary Artery Distribution:  The left anterior descending coronary artery is a normal caliber vessel. The LAD arises normally from the left main coronary artery. The LAD demonstrated diffuse mild calcification and mild proximal to mid obstruction.    Circumflex Coronary Artery Distribution:  The circumflex coronary artery is a normal caliber vessel. The circumflex arises normally from the left main coronary artery and terminates in the AV groove. The circumflex revealed mild ostial to proximal obstruction.    Right Heart Catheterization:  Cardiac output was calculated via the Sonya method.    LVEDP 20 mmHg, no pullback gradient across LV-Ao  Ao (103 mmHg) 95%  RA 15 mmHg, 50%  RV 50/15 mmHg  PA 50/25 mmHg, 33.3 mmHg, 47%  PCWP Could not wedge the catehter  SVR 2427 cgs  CO 2.9 L/min, CI 1.7 L/min/m2.    Right Coronary Artery Distribution:    The right coronary artery is a normal caliber vessel. The RCA arises normally from the right sinus of Valsalva. The RCA showed no significant disease or stenosis greater than 30% and  diffuse mild calcification.    Hemo Personnel:  +--------------------+-------------+  Name                Duty           +--------------------+-------------+  Gen Jacinto MD        PROC MD 1  +--------------------+-------------+  Jona Jacinto RT   PROC SCRUB 1  +--------------------+-------------+  Zabrina Scruggs RN  PROC CIRC 1  +--------------------+-------------+  Brielle Wallace RN    PROC RECORD 1  +--------------------+-------------+  Ashlee Davis RN     PROC RECORD 2  +--------------------+-------------+  Elio Macedo RN      PROC NURSE 1  +--------------------+-------------+  Angeles Gutierrez RN  PROC NURSE 2  +--------------------+-------------+      Sedation Time:  +------------------------+------------------+  Sedation Start/End TimesTime                +------------------------+------------------+  End                     8/24/2022 13:41:16  +------------------------+------------------+      Equipment Used:  +---------------------+--------------------------------------------------------+              Date/Time                                             Description  +---------------------+--------------------------------------------------------+  8/24/2022 12:31:37 PM     - Capnoflex LF Oral/Nasal CO2 - Qty: 1                                                               Each Part #: [id number]  +---------------------+--------------------------------------------------------+  8/24/2022 12:31:42 PM    Terumo TR Band Regular - Qty: 1 Each                                                                  Part #: [id number]  +---------------------+--------------------------------------------------------+  8/24/2022 12:32:33 PM    [employer] Guidewire 3mm J                                          210 cm .035 - Qty: 1 Each Part #: [id number]  +---------------------+--------------------------------------------------------+  8/24/2022 12:32:40 PM    - Erwin  "Slender 6 FR x 10cm -                                                         Qty: 1 Each Part #: 13  +---------------------+--------------------------------------------------------+  8/24/2022 12:43:19 PM Terumo Runthrough NS Extra Floppy                                                180cm - Qty: 1 Each Part #: [id number]  +---------------------+--------------------------------------------------------+  8/24/2022 12:43:41 PM  Terumo TERUMO Glidesheath Slender 5Fr                                10cm (C-0474) - Qty: 1 Each Part #: [id number]  +---------------------+--------------------------------------------------------+ 8/24/2022 12:44:00 PM      Arrow Arrow 5 FR 110cm Balloon                              Wedge Pressure Catheter - Qty: 1 Each Part #: [id number]  +---------------------+--------------------------------------------------------+  8/24/2022 12:51:15 PM                   200 300cm wire  +---------------------+--------------------------------------------------------+  8/24/2022 12:54:17 PM   Navicross 0.035 135cm microcatheter  +---------------------+--------------------------------------------------------+  8/24/2022 12:58:46 PM  [employer] Spartacore 0.014\" x 300cm                                            Guidewire - Qty: 1 Each Part #: 604  +---------------------+--------------------------------------------------------+   8/24/2022 1:05:28 PM    [employer] V-18 Control                                  Wire Straight 300cm - Qty: 1 Each Part #: [id number]  +---------------------+--------------------------------------------------------+   8/24/2022 1:22:46 PM  Terumo TERUMO Glidesheath Slender 5Fr                                10cm (W-5596) - Qty: 1 Each Part #: [id number]  +---------------------+--------------------------------------------------------+   8/24/2022 1:24:59 PM  Terumo TERUMO Glidesheath Slender 5Fr                                10cm (C-9304) - " Qty: 1 Each Part #: [id number]  +---------------------+--------------------------------------------------------+   8/24/2022 1:26:25 PM Medtronic DXTERITY ANG PIGTAIL 5FR X                                                145cm - Qty: 1 Each Part #: 655  +---------------------+--------------------------------------------------------+   8/24/2022 1:26:36 PM      Medtronic DXTERITY JR 4.0 5FR X                                           100CM - Qty: 1 Each Part #: [id number]  +---------------------+--------------------------------------------------------+   8/24/2022 1:26:52 PM              DXTERITY JL 3.5 5 [place]  +---------------------+--------------------------------------------------------+ 8/24/2022 1:27:21 PM  [employer] Guidewire 1.5mm J                                           210cm .035 - Qty: 1 Each Part #: 509  +---------------------+--------------------------------------------------------+   8/24/2022 1:28:53 PM Medtronic DXTERITY ANG PIGTAIL 5FR X                                                145cm - Qty: 1 Each Part #: 655  +---------------------+--------------------------------------------------------+      Fluoroscopy Time:  +--------------------------+---------+  X-Ray Summary Fluoro Time:17.30 min  +--------------------------+---------+      +----------+--------+  Contrast: Dose:     +----------+--------+  Omnipaque:60.00 ml  +----------+--------+      Hemodynamic Pressures:    +----+----------+---------+-------------+-------------+---+----+-------+-------+  SiteDate Time   Phase  Systolic mmHg  Diastolic  ED MeanA-Wave V-Wave                   Name                    mmHg     mmHmmHg mmHg   mmHg                                                      g                     +----+----------+---------+-------------+-------------+---+----+-------+-------+    PA 8/24/2022 [facility]           43           08 71                     1:08:08 PM                                                          +----+----------+---------+-------------+-------------+---+----+-------+-------+    RV 8/24/2022 [facility]           41            6 14                        1:09:09 PM                                                          +----+----------+---------+-------------+-------------+---+----+-------+-------+    RA 8/24/2022 [facility]                               15     18     21      1:09:32 PM                                                          +----+----------+---------+-------------+-------------+---+----+-------+-------+    LV 8/24/2022 [facility]          142            7 21                        1:31:24 PM                                                          +----+----------+---------+-------------+-------------+---+----+-------+-------+   LVp 8/24/2022 [facility]          144            6 19                        1:31:33 PM                                                          +----+----------+---------+-------------+-------------+---+----+-------+-------+   AOp 8/24/2022 [facility]          145           75    103                    1:31:40 PM                                                          +----+----------+---------+-------------+-------------+---+----+-------+-------+    AO 8/24/2022 [facility]          146           71    102                    1:33:32 PM                                                          +----+----------+---------+-------------+-------------+---+----+-------+-------+        Oxygen Saturation %:  +-----------+----------+------------+  Sample SiteO2 Sat (%)HB (g/100ml)  +-----------+----------+------------+           AO        95        12.7  +-----------+----------+------------+           RA        50        12.7  +-----------+----------+------------+            AO        95        12.7  +-----------+----------+------------+           PA        47        12.7  +-----------+----------+------------+      Cardiac Outputs:  +---------------+------------------+-------+  KEHINDE CO (l/min)KEHINDE CI (l/min/m2)KEHINDE SV  +---------------+------------------+-------+              2.9               1.7   38.0  +---------------+------------------+-------+      Vascular Resistance Calculated Values (Wood Units):  +-----+----+----+----+----+----+----+-------+  PhaseSVR SVRITPR TPRITVR TVRITPR/TVR  +-----+----+----+----+----+----+----+-------+  0    [id number]        +-----+----+----+----+----+----+----+-------+      Complications:  No in-lab complications observed.    Cardiac Cath Transition of Care Summary:  Post Procedure Diagnosis: Non-ischemic cardiomyopathy.  Blood Loss:               Estimated blood loss during the procedure was 20 mls.  Specimens Removed:        Number of specimen(s) removed: none.    ____________________________________________________________________________________  CONCLUSIONS:  1. Non-ischemic cardiomyopathy.  2. Continue guideline directed medical therapy for severe chronic left ventricular systolic dysfunction.    ____________________________________________________________________________________        TEZ Koenig

## 2023-11-30 NOTE — PROGRESS NOTES
Critical Care Daily Progress        Subjective   Patient is a 88 y.o. female admitted on 11/26/2023  2:49 PM with the following indication(s) for ICU care: Septic shock requiring the Levophed.     Interval History: Patient developed hypotension last night requiring to start on dopamine.  This morning the patient was on 2.5 of dopamine.  This morning the I discussed with the patient family including the patient daughter and grandson (DPOA) and explained about the lesions that they meet with the palliative care yesterday including DNR/DNI and no escalation of care.     Scheduled Medications:   amiodarone, 200 mg, oral, Daily  [Held by provider] apixaban, 2.5 mg, oral, BID  [Held by provider] carvedilol, 6.25 mg, oral, BID  [Held by provider] furosemide, 80 mg, intravenous, BID  magnesium oxide, 400 mg, oral, Daily  meropenem, 1,000 mg, intravenous, q12h  mirtazapine, 7.5 mg, oral, Nightly  polyethylene glycol, 17 g, oral, Daily  vancomycin, 750 mg, intravenous, q24h  zinc oxide, 1 Application, Topical, BID         Continuous Medications:   D5 % and 0.9 % sodium chloride, 30 mL/hr, Last Rate: 30 mL/hr (11/30/23 1149)         PRN Medications:   PRN medications: acetaminophen **OR** acetaminophen **OR** acetaminophen, LORazepam, ondansetron **OR** ondansetron, oxygen    Objective   Vitals:  Most Recent:  Vitals:    11/30/23 1600   BP: (!) 81/28   Pulse: 73   Resp: (!) 31   Temp:    SpO2: 99%       24hr Min/Max:  Temp  Min: 35.1 °C (95.2 °F)  Max: 36 °C (96.8 °F)  Pulse  Min: 73  Max: 122  BP  Min: 62/52  Max: 123/72  Resp  Min: 20  Max: 40  SpO2  Min: 87 %  Max: 100 %    LDA:  CVC 11/28/23 Triple lumen Right Internal jugular (Active)   Placement Date/Time: 11/28/23 (c) 1010   Hand Hygiene Performed Prior to CVC Insertion: Yes  Site Prep: Chlorhexidine   All 5 Sterile Barriers Used (Gloves, Gown, Cap, Mask, Large Sterile Drape): Yes  Local Anesthetic: Injectable  Lumen Type: Triple l...   Number of days: 1         Vent  settings:       Hemodynamic parameters for last 24 hours:       I/O:    Intake/Output Summary (Last 24 hours) at 11/30/2023 1703  Last data filed at 11/30/2023 1600  Gross per 24 hour   Intake 555.61 ml   Output 305 ml   Net 250.61 ml         Physical Exam:   Physical Exam   Vitals reviewed.   Constitutional:       Appearance: She is ill-appearing.   HENT:      Head: Normocephalic and atraumatic.   Eyes:      Conjunctiva/sclera: Conjunctivae normal.      Pupils: Pupils are equal, round, and reactive to light.   Cardiovascular:      Rate and Rhythm: Normal rate. Rhythm irregular.   Pulmonary:      Effort: Pulmonary effort is normal.      Breath sounds: Rales present.   Abdominal:      General: Abdomen is flat.      Palpations: Abdomen is soft.   Musculoskeletal:      Comments: Generalized weakness   Skin:     General: Skin is warm and dry.   Neurological:      Mental Status: She is disoriented.   Psychiatric:      Comments: Lethargic     Lab/Radiology/Diagnostic Review:  Results for orders placed or performed during the hospital encounter of 11/26/23 (from the past 24 hour(s))   CBC   Result Value Ref Range    WBC      nRBC      RBC      Hemoglobin      Hematocrit      MCV      MCH      MCHC      RDW      Platelets     Basic Metabolic Panel   Result Value Ref Range    Glucose 62 (L) 74 - 99 mg/dL    Sodium 134 (L) 136 - 145 mmol/L    Potassium 3.5 3.5 - 5.3 mmol/L    Chloride 101 98 - 107 mmol/L    Bicarbonate 16 (L) 21 - 32 mmol/L    Anion Gap 21 (H) 10 - 20 mmol/L    Urea Nitrogen 28 (H) 6 - 23 mg/dL    Creatinine 1.42 (H) 0.50 - 1.05 mg/dL    eGFR 36 (L) >60 mL/min/1.73m*2    Calcium 8.4 (L) 8.6 - 10.3 mg/dL   CBC   Result Value Ref Range    WBC 9.6 4.4 - 11.3 x10*3/uL    nRBC 0.0 0.0 - 0.0 /100 WBCs    RBC 4.78 4.00 - 5.20 x10*6/uL    Hemoglobin 12.2 12.0 - 16.0 g/dL    Hematocrit 38.9 36.0 - 46.0 %    MCV 81 80 - 100 fL    MCH 25.5 (L) 26.0 - 34.0 pg    MCHC 31.4 (L) 32.0 - 36.0 g/dL    RDW 21.7 (H) 11.5 - 14.5  %    Platelets 241 150 - 450 x10*3/uL     MR foot left wo IV contrast    Result Date: 11/28/2023  Interpreted By:  Aubrey Baker, STUDY: MR FOOT LEFT WO IV CONTRAST;   INDICATION: Signs/Symptoms:Nonhealing wound.   COMPARISON: Plain film radiographs of the left foot October 9, 2023   ACCESSION NUMBER(S): [id number]   ORDERING CLINICIAN: KEON DELATORRE   TECHNIQUE: Routine multiplanar multisequential MRI left foot without contrast   FINDINGS: There is generalized subcutaneous edema of the entirety of the visualized left forefoot from the dorsum of the toes to the distal ankle.   There is no evidence of a discrete focal fluid collection.   No definite large skin wound seen.   There is no evidence of osteomyelitis.   There is no evidence of fracture.   No marrow replacement lesions seen.   Some mild midfoot osteoarthritis noted. Mild arthrosis of the 1st MTP.   No acute tendinous or ligamentous abnormality.   No evidence of a soft tissue mass.   No sizeable effusions.   No large chondral defects.   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       Nonspecific subcutaneous edema of the left foot favoring cellulitis.   No findings to suggest osteomyelitis.   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   Signed by: Aubrey Baker 11/28/2023 5:09 PM Dictation workstation:   [id number]    Transthoracic Echo (TTE) Complete    Result Date: 11/28/2023              [place] [address]      Phone [phone] Fax [phone] TRANSTHORACIC ECHOCARDIOGRAM REPORT  Patient Name:      SHAZIA TANA Che Physician:    [id number] Wil Leiva MD Study Date:        11/28/2023           Ordering Provider:    [id number] WASHINGTON MRN/PID:           [id number]             Fellow:  Accession#:        [id number]         Nurse: Date of Birth/Age: 1935 / 88      Sonographer:          Laura copeland                                      RD Gender:            F                    Additional Staff: Height:            162.56 cm            Admit Date:           11/26/2023 Weight:            60.78 kg             Admission Status:     Inpatient -                                                               Routine BSA:               1.65 m2              Department Location:  [facility] Blood Pressure: 97 /71 mmHg Study Type:    TRANSTHORACIC ECHO (TTE) COMPLETE Diagnosis/ICD: Acute combined systolic (congestive) and diastolic (congestive)                heart failure (CHF)-I50.41 Indication:    Congestive Heart Failure CPT Codes:     Echo Complete w Full Doppler-[id number]  Study Detail: The following Echo studies were performed: 2D, M-Mode, Doppler and               color flow.  PHYSICIAN INTERPRETATION: Left Ventricle: Left ventricular systolic function is severely decreased, with an estimated ejection fraction of 10-15%. There is global hypokinesis of the left ventricle with minor regional variations. The left ventricular cavity size is normal. Spectral Doppler shows an impaired relaxation pattern of left ventricular diastolic filling. Left Atrium: The left atrium is mildly dilated. Right Ventricle: The right ventricle is mildly enlarged. There is reduced right ventricular systolic function. Right Atrium: The right atrium is mildly dilated. Aortic Valve: The aortic valve is trileaflet. There is moderate aortic valve regurgitation. The peak instantaneous gradient of the aortic valve is 2.9 mmHg. Mitral Valve: The mitral valve is normal in structure. There is mild to moderate mitral valve regurgitation. Tricuspid Valve: The tricuspid valve is structurally normal. There is moderate to severe tricuspid regurgitation. The Doppler estimated RVSP is mildly elevated at 41.3  mmHg. Echodensity visualized in the IVC. Pulmonic Valve: The pulmonic valve is structurally normal. There is mild pulmonic valve regurgitation. Pericardium: There is no pericardial effusion noted. Pleural: There is a large bilateral pleural effusion. Aorta: The aortic root is normal.  CONCLUSIONS:  1. Left ventricular systolic function is severely decreased with a 10-15% estimated ejection fraction.  2. Spectral Doppler shows an impaired relaxation pattern of left ventricular diastolic filling.  3. There is reduced right ventricular systolic function.  4. There is a large pleural effusion.  5. Mild to moderate mitral valve regurgitation.  6. Mildly elevated RVSP.  7. Moderate to severe tricuspid regurgitation.  8. Moderate aortic valve regurgitation.  9. There is global hypokinesis of the left ventricle with minor regional variations. QUANTITATIVE DATA SUMMARY: 2D MEASUREMENTS:                          Normal Ranges: Ao Root d:     3.20 cm   (2.0-3.7cm) LAs:           4.56 cm   (2.7-4.0cm) IVSd:          0.63 cm   (0.6-1.1cm) LVPWd:         0.60 cm   (0.6-1.1cm) LVIDd:         4.53 cm   (3.9-5.9cm) LVIDs:         4.28 cm LV Mass Index: 49.8 g/m2 LV % FS        5.5 % LA VOLUME:                               Normal Ranges: LA Vol A4C:        61.8 ml    (22+/-6mL/m2) LA Vol A2C:        85.1 ml LA Vol BP:         73.0 ml LA Vol Index A4C:  37.4ml/m2 LA Vol Index A2C:  51.6 ml/m2 LA Vol Index BP:   44.3 ml/m2 LA Area A4C:       21.9 cm2 LA Area A2C:       25.9 cm2 LA Major Axis A4C: 6.6 cm LA Major Axis A2C: 6.7 cm LA Vol A4C:        60.5 ml LA Vol A2C:        87.5 ml RA VOLUME BY A/L METHOD:                       Normal Ranges: RA Area A4C: 23.8 cm2 AORTA MEASUREMENTS:                    Normal Ranges: Ao STJ, d: 2.30 cm (1.7-3.4cm) Asc Ao, d: 3.50 cm (2.1-3.4cm) LV SYSTOLIC FUNCTION BY 2D PLANIMETRY (MOD):                     Normal Ranges: EF-A4C View: 17.1 % (>=55%) EF-A2C View: 18.2 % EF-Biplane:  16.4 % LV  DIASTOLIC FUNCTION:                           Normal Ranges: MV Peak E:    0.62 m/s    (0.7-1.2 m/s) MV Peak A:    0.93 m/s    (0.42-0.7 m/s) E/A Ratio:    0.67        (1.0-2.2) MV e'         0.02 m/s    (>8.0) MV lateral e' 0.02 m/s MV medial e'  0.02 m/s MV A Dur:     108.47 msec E/e' Ratio:   31.13       (<8.0) MITRAL VALVE:                Normal Ranges: MV DT: 49 msec (150-240msec) MITRAL INSUFFICIENCY:                           Normal Ranges: PISA Radius:  0.5 cm MR VTI:       132.63 cm MR Vmax:      392.73 cm/s MR Alias Juan: 39.3 cm/s MR Volume:    25.17 ml MR Flow Rt:   74.55 ml/s MR EROA:      0.19 cm2 AORTIC VALVE:                         Normal Ranges: AoV Vmax:      0.85 m/s (<=1.7m/s) AoV Peak P.9 mmHg (<20mmHg) LVOT Max Juan:  0.59 m/s (<=1.1m/s) LVOT VTI:      10.39 cm LVOT Diameter: 1.96 cm  (1.8-2.4cm) AoV Area,Vmax: 2.12 cm2 (2.5-4.5cm2) AORTIC INSUFFICIENCY: AI Vmax:       3.14 m/s AI Half-time:  453 msec AI Decel Time: 1563 msec AI Decel Rate: 204.64 cm/s2  RIGHT VENTRICLE: RV Basal 4.36 cm RV Mid   3.40 cm RV Major 6.0 cm TAPSE:   14.8 mm RV s'    0.13 m/s TRICUSPID VALVE/RVSP:                             Normal Ranges: Peak TR Velocity: 2.89 m/s RV Syst Pressure: 41.3 mmHg (< 30mmHg) IVC Diam:         1.98 cm TV e'             0.1 m/s AORTA: Asc Ao Diam 3.46 cm  [id number] Wil Leiva MD Electronically signed on 2023 at 1:00:55 PM  ** Final **     XR chest 1 view    Result Date: 2023  Interpreted By:  Daniella Baker, STUDY: XR CHEST 1 VIEW;  2023 10:58 am   INDICATION: Signs/Symptoms:placement of central line.   COMPARISON: 2023   ACCESSION NUMBER(S): [id number]   ORDERING CLINICIAN: MITCH PADILLA   FINDINGS: Cardiac silhouette appears enlarged. There are atherosclerotic changes of the aorta.   There is bilateral parenchymal infiltration. There are bilateral pleural effusions.   Degenerative changes present on the right with tip in the region of the superior vena cava.    There are degenerative changes of the spine.   COMPARISON OF FINDING: The catheter was placed in the interval.       Interval placement of a right jugular catheter. Findings suggestive of congestive heart failure.   MACRO: none   Signed by: Daniella Baker 11/28/2023 11:06 AM Dictation workstation:   [id number]    CARRIE without exercise    Result Date: 11/28/2023  Preliminary Cardiology Report              [place] [id number]      Phone [phone] Fax [phone]            Preliminary Vascular Lab Report  [facility] ANKLE BRACHIAL INDEX (CARRIE) WITHOUT EXERCISE  Patient Name:     SHAZIA FAJARDO Yane Physician:  [id number] Maribell Rao MD Study Date:       11/28/2023     Ordering Provider:  [id number] KEON DELATORRE MRN/PID:          [id number]       Fellow: Accession#:       [id number]   Technologist:       Yasmin Alamo RVT YOB: 1935     Technologist 2: Gender:           F              Encounter#:         [id number] Admission Status: Inpatient      Location Performed: [facility]  Diagnosis/ICD: Peripheral vascular disease, unspecified-I73.9  PRELIMINARY CONCLUSIONS:  Right Lower PVR: No evidence of arterial occlusive disease in the right lower extremity at rest. Triphasic flow is noted in the right common femoral artery, right posterior tibial artery and right dorsalis pedis artery. Left Lower PVR: No evidence of arterial occlusive disease in the left lower extremity at rest. Biphasic flow is noted in the left posterior tibial artery. Triphasic flow is noted in the left common femoral artery and left dorsalis pedis artery. Additional Findings: Technically limited due to movement, cooperation, pain tolerance to cuffs.  Imaging & Doppler Findings:  RIGHT Lower PVR                Pressures Ratios Right Posterior Tibial (Ankle) 108 mmHg  1.11 Right Dorsalis Pedis (Ankle)   104 mmHg  1.07 Right Digit (Great Toe)        65 mmHg   0.67   LEFT Lower  PVR                Pressures Ratios Left Posterior Tibial (Ankle) 109 mmHg  1.12 Left Dorsalis Pedis (Ankle)   111 mmHg  1.14 Left Digit (Great Toe)        45 mmHg   0.46                      Left Brachial Pressure 97 mmHg   VASCULAR PRELIMINARY REPORT completed by Yasmin Alamo RVT on 11/28/2023 at 7:44:46 AM  ** Final **     XR chest 1 view    Result Date: 11/28/2023  Interpreted By:  Alfredito Rivera, STUDY: XR CHEST 1 VIEW;  11/27/2023 11:55 pm   INDICATION: Signs/Symptoms:CHF.   COMPARISON: 08/21/2022   ACCESSION NUMBER(S): [id number]   ORDERING CLINICIAN: KEON DELATORRE   FINDINGS: The heart is enlarged The pulmonary vasculature is congested centrally and indistinct peripherally, with interlobular septal thickening and indistinct mid to lower lung consolidative opacities consistent with pulmonary edema. There are large bilateral pleural effusions.       Severe pulmonary edema and large bilateral pleural effusions.     MACRO: None.   Signed by: Alfredito Rivera 11/28/2023 2:16 AM Dictation workstation:   [id number]    CT angio lower extremity left w and or wo IV contrast    Result Date: 11/26/2023  INDICATION:  Multiple left foot wounds with purple discoloration and cold foot.  Assess for arterial occlusion. TECHNIQUE:  CTA images were obtained through the left lower extremity without and with intravenous contrast.   Omnipaque 350, 100 mL was administered intravenously.  Images are reviewed and processed at a workstation according to the CT angiogram protocol with 3-D and/or MIP post processing imaging generated. Automated mA/kV exposure control was utilized and patient examination was performed in strict accordance with principles of ALARA. COMPARISON:  US venous left lower extremity 10/09/2023.  XR left foot 10/09/2023. FINDINGS:  Limited imaging of the abdomen demonstrates an IVC filter.  There is extensive atherosclerotic disease and mural thrombus in the abdominal aorta there is a mild stenosis of  the inferior mesenteric artery.  The left renal artery is not fully evaluated but does not appear to have flow limiting stenosis.  There is no evidence of abdominal aortic aneurysmal disease. There is colonic diverticulosis without evidence of diverticulitis. . This been previous hysterectomy.  Sclerotic lesion is seen in the right iliac bones.  Plain film radiography is recommended.  Clinical correlation is advised. On the right: Common iliac artery is patent.  The external iliac artery is patent.  The right common femoral artery is not visualized remain in the right lower extremity was not studied. On the left: Common iliac artery is patent.  The external iliac artery is patent.  Common femoral artery is patent.  The superficial femoral artery is patent.  Popliteal artery is patent. Anterior tibial artery, tibioperoneal trunk, posterior tibial artery, peroneal artery, dorsalis pedis artery and pedal posterior tibial artery are patent. Soft tissues of the left lower extremity are diffusely thickened consistent with advanced edema.  The musculature is poorly defined due to the edema which extends into muscular compartments.  Underlying cellulitis is not excluded.  There is a left knee joint effusion. Degenerative changes are noted at the left knee.    1. IVC filter in place. 2. Extensive atherosclerotic disease of the abdominal aorta without evidence of aneurysmal disease. 3. Mild stenosis of the inferior mesenteric artery.  4. Extensive cellulitis or soft tissue edema involving the entire left lower extremity. On the right: 1.  No evidence of iliac artery stenosis. 2.  The right common femoral artery was not studied. On the left: 1.  Cellulitis or soft tissue edema involving the entire left lower extremity. 2.  No evidence of arterial occlusion. Signed by Alfredito Artis M.D.                Malnutrition Diagnosis Status: New  Malnutrition Diagnosis: Moderate malnutrition related to chronic disease or condition  As  Evidenced by: moderate muslce and fat losses, energy intakes <75% estimated needs x 1 month  I agree with the dietitian's malnutrition diagnosis.      Assessment/Plan   Principal Problem:    Shock (CMS/HCC)  Active Problems:    Weakness    Acute systolic heart failure (CMS/HCC)    Paroxysmal atrial fibrillation (CMS/HCC)    Cellulitis of left leg    Aspiration into airway    History of non-Hodgkin's lymphoma    History of DVT of lower extremity    Patricia Jewell is a 88 y.o. female with CHF, atrial fibrillation on Eliquis, hypertension, history of non-Hodgkin's lymphoma in remission since 2005, history of right lower extremity blood clot s/p IVC filter was transferred to the ICU because of shock requiring vasopressors.       Septic shock secondary to cellulitis and high risk of aspiration  11/28/2023: This morning when I I was consulted she was on phenylephrine.   I changed from phenylephrine to Levophed to avoid hypervolemia and congestive heart failure exacerbation patient.    Could not use 30 cc/kg IV fluids because of CHF exacerbation  Placed a central line.  Please refer to procedure note  Titrate Levophed to maintain MAP more than 60  Held Lasix drip and Coreg because of hypotension  Continue vancomycin, escalated from a cefepime to Zosyn for anaerobic coverage  11/29/2023:Patient has been off Levophed at 2 PM yesterday 11/20/2023.    ID escalated antibiotic from Zosyn to meropenem and clindamycin  Blood cultures negative till date   Wound culture abundant mixed gram-positive and gram-negative bacteria  MRSA PCR negative  11/30/2023: Patient developed hypotension last night requiring to start on dopamine.  This morning the patient was on 2.5 of dopamine.    This morning the I discussed with the patient family including the patient daughter and grandson (DPOA) and explained about the lesions that they meet with the palliative care yesterday including DNR/DNI and no escalation of care.     2.  Congestive heart  failure exacerbation  11/28/2023: Holding on Lasix because of high-dose requirement of vasopressors  Will resume Lasix once blood pressure is better controlled  11/29/2023: Started the patient on IV Lasix 80 mg twice daily     3.  Atrial fibrillation with RVR  Continue Eliquis  Holding Coreg because of hypotension  Maintain heart rate less than 120     4.  High risk of aspiration  Consulted speech therapy for evaluation     5.  Acute encephalopathy probably secondary to septic shock  Patient is alert oriented x 2, does not have a capacity to make medical decisions.    Septic shock management as above  11/29/2023: Patient continued to be disoriented, which has not been a chronic thing for the last 3 weeks as per the family.   As the patient CODE STATUS changed to DNR/DNI with no escalation of care after the palliative care meeting, no significant benefit of getting a CT head at this time     6.  Counseling regarding advance care directives and goals of care  11/28/2023: Patient is alert oriented x 2, does not have a capacity to make medical decisions.    I had extensive discussion with the patient daughter and grandson who are at bedside.    As per the patient daughter, grandson is a medical power of  for the patient.   Explained the difference between full code versus DNR  Family wants to continue full code for now  Palliative care consulted  11/29/2023: Patient is changed to DNR/DNI with no escalation of care as per palliative care after the palliative care meeting  I met the family updated overall condition of the patient and answered all the questions  11/30/2023: Patient developed hypotension last night requiring to start on dopamine.  This morning the patient was on 2.5 of dopamine.    This morning the I discussed with the patient family including the patient daughter and grandson (DPOA) and explained about the lesions that they meet with the palliative care yesterday including DNR/DNI and no escalation  of care.   Family understood that escalation of care not restarting on vasopressors as she was not on vasopressors at the time of palliative care meeting  Weaned off dopamine as per the family request  Patient is already with home hospice    I spent 30 minutes of cumulative critical care time with the patient, not including procedure time.  Greater than 50% of that time was spent in the direct collaboration and or coordination of care of the patient.   Will transfer the patient to [facility] with telemetry.      Dragon dictation software was used to dictate this note and thus there may be minor errors in translation/transcription including garbled speech or misspellings. Please contact for clarification if needed.   Oziel Hassan MD

## 2023-11-30 NOTE — PROGRESS NOTES
Physical Therapy                 Therapy Communication Note    Patient Name: Patricia Jewell  MRN: [id number]  Today's Date: 11/30/2023     Discipline: Physical Therapy    Missed Visit Reason:  RN advised to HOLD PT today, just gave pt Ativan and pt is resting well. RN states pt is to be disch home tomorrow on Hospice, confirmed that family has not been requesting OOB mobility as inpatient.    Missed Time: Attempt    Comment: Skilled PT not indicated, dtr and grand dtr are caregivers, will  have Hospice support at home.

## 2023-12-01 LAB
BACTERIA BLD CULT: NORMAL
BACTERIA BLD CULT: NORMAL
BACTERIA SPEC CULT: ABNORMAL
GRAM STN SPEC: ABNORMAL
GRAM STN SPEC: ABNORMAL

## 2023-12-01 NOTE — SIGNIFICANT EVENT
Preliminary Cause of Death:  · Date and Time of Death :DATE: 11/30/23 / TIME: 7:16pm   pm· Preliminary Cause of Death multi-system organ failure   · Comments Code Status Noted at DNR/DNI, per hospice documentation patient was a Hospice patient   Paged by nursing that patient becameUnresponsive. On physical exam, patient was pale without spontaneous movements and did not respond to physical or verbal stimuli. There was no response to name or painful nailbed pressure. Radial pulses absent, palpated fpr >60 seconds. There were absent cardiac and lung sounds on all ausculatory fields. Pupils were fixed, dilated, and nonreactive bilaterally. Absent Gag and Corneal reflexes.    :Patient pronounced dead at  on 7:16pm on 11/30/23 .    Family was at bedside and condolences provided.     Roger Arora, DO

## 2023-12-02 LAB
BACTERIA BLD CULT: NORMAL
BACTERIA BLD CULT: NORMAL

## 2023-12-04 ENCOUNTER — APPOINTMENT (OUTPATIENT)
Dept: CARDIOLOGY | Facility: HOSPITAL | Age: 88
End: 2023-12-04
Payer: MEDICARE

## 2023-12-04 NOTE — DISCHARGE SUMMARY
Admission Date: 2023  2:49 PM  Discharge Date: 23   Time of passin (7:16PM)  Condition at discharge:     Discharge Diagnosis  Shock (CMS/HCC)    Hospital Course   Patricia Jewell is a 88 y.o. female with CHF, atrial fibrillation on Eliquis, hypertension, history of non-Hodgkin's lymphoma in remission since , history of right lower extremity blood clot s/p IVC filter who presented to the emergency department 2023 with complaints of erythema, swelling, purulence from left lower extremity wound. Apparently patient has had a wound on her left lower extremity since approximately mid October.  She has been seen for this wound multiple times by both emergency department and PCP, has completed 2 rounds of Keflex prior to this.  Daughter at bedside reports they had been doing local wound care at home with Epsom salt soaks and Neosporin however due to the swelling in the legs it appears she had a wound open on the top of her foot sometime in the last 1 month. 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  Nocturinst notified of hypotension 78/48 on 23. Coreg held, patient moved to ICU and started on pressors as could not give full 30ml/kg sepsis bolus given acute heart failure and volume overload. Lactate 3.8-> 3.3-->4.7-->3.3-->1.7. Blood cultures negative x2 days. CARRIE negative. MRI with cellulitis, no OM. Echo: EF 10-15%, +DD, reduced RV systolic function, mod-severe TR, global hypokinesis LV     W cx with abundant mixed bacteria   23: Had further hypotension overnight, 63/44- family wished for pressors so dopamine was started. No leukocytosis. Cr 1.42, suspect from hypotension and lasix- hold lasix. Family met with [facility] today, goal is continue care as is for today with NO  escalation of care and discharge to home with hospice care tomorrow.      Nocturinst called to room as patient found unresponsive. Had DNR. Pronounced  at 7:16 PM om 23    Consultations: Critical Care consulted. Treatment options were discussed and plan of care agreed upon. Infectious Disease consulted. Treatment options were discussed and plan of care agreed upon. Palliative Care consulted. Treatment options were discussed and plan of care agreed upon. Podiatry consulted. Treatment options were discussed and plan of care agreed upon.    Pertinent Physical Exam At Time of Discharge  Please see daily progress examination 23 OR significant event note for death examination.     Code Status: DNR/DNI, no escalation of care    Discharge planning took greater than 35 minutes    Discharge destination:     Eveline Artis PA-C

## 2023-12-21 ENCOUNTER — APPOINTMENT (OUTPATIENT)
Dept: CARDIOLOGY | Facility: CLINIC | Age: 88
End: 2023-12-21
Payer: MEDICARE

## 2023-12-27 NOTE — ED PROVIDER NOTES
Chief Complaint   Patient presents with    left foot swelling/ infection        HPI:   88 y.o. female with a history of paroxysmal A-fib on Eliquis, recent foot cellulitis who is presenting to the ED with left foot pain and swelling.  Has recently been taken off antibiotics and symptoms seem to have progressively worsened.  It is mostly in the left foot but extends into the ankle as well.  She has not fallen or had any new injuries. She does report feeling unwell. No nausea, vomiting. No obvious fevers.     History provided by: patient and family at bedside  Limitations to history: none    ------------------------------------------------------------------------------------------------------------------------------------------    ED Triage Vitals   Temp Heart Rate Resp BP   11/26/23 1430 11/26/23 1430 11/26/23 1430 11/26/23 1434   36.8 °C (98.3 °F) 110 18 108/85      SpO2 Temp Source Heart Rate Source Patient Position   11/26/23 1430 11/26/23 1430 11/26/23 1430 11/26/23 1430   93 % Temporal Monitor Sitting      BP Location FiO2 (%)     11/26/23 1430 --     Left arm           Physical Exam:  Triage vitals reviewed.  Constitutional: Elderly female, appears frail  Head: Normocephalic, atraumatic  Skin: See below  Eyes: Pupils are equal. No conjunctival injection.  Neck: Supple. Trachea is midline.  Pulmonary: Normal work of breathing with no accessory muscle use noted.  Clear to auscultation bilaterally.   Cardiovascular: Tachycardic. 2+ radial pulses bilaterally, 1+ DP RLE, dopplerable LLE.   Abdomen: Soft, nondistended. Nontender to palpation.  Extremities: L foot is edematous and discolored with dark erythematous changes. Difficulty palpating pulse compared to R foot, but is dopplerable.  Neuro: Awake and alert. Face is symmetric.  Speech is clear. Able to wiggle toes, sensation to light touch intact BLEs.  Psych: Appropriate mood and  affect.    ------------------------------------------------------------------------------------------------------------------------------------------    Medical Decision Making:  This patient is a 88 y.o. female with a history of paroxysmal A-fib on Eliquis, recent foot cellulitis who is presenting to the ED with left foot pain and swelling.  Left foot is extremely edematous, with dark violet changes and diminished pulses compared to the right foot.  It was dopplerable but still significantly different.  It is also markedly tender to touch.  Patient is tachycardic with labs showing elevated lactate concerning for recurrent cellulitis and possible bacteremia.  Blood cultures obtained and she was started on broad-spectrum antibiotics.  CTA was obtained to rule out vascular occlusion, does show chronic atherosclerotic changes but otherwise left lower extremity only showing cellulitis on CT.  Patient will be admitted to medicine for further management.    Diagnoses as of 12/27/23 1723   Cellulitis, unspecified cellulitis site        Clinical Impression:  Cellulitis of L foot    Disposition:  Admit to telemetry    Xin Kline DO  Emergency Medicine         Xin Kline DO  12/27/23 2217

## 2025-02-24 NOTE — PROGRESS NOTES
Occupational Therapy                 Therapy Communication Note    Patient Name: Patricia Jewell  MRN: [id number]  Today's Date: 11/30/2023     Discipline: Occupational Therapy    Spoke to YOGESH regarding pt. who is now in ICU, and pt. going home with hospice care tomorrow.  Pt. resting comfortably at this time.  Will discharge from O.T. 2/2 hospice svcs. to follow.       ICU